# Patient Record
Sex: MALE | Race: BLACK OR AFRICAN AMERICAN | Employment: OTHER | URBAN - METROPOLITAN AREA
[De-identification: names, ages, dates, MRNs, and addresses within clinical notes are randomized per-mention and may not be internally consistent; named-entity substitution may affect disease eponyms.]

---

## 2020-02-10 ENCOUNTER — TELEPHONE (OUTPATIENT)
Dept: ORTHOPEDIC SURGERY | Age: 65
End: 2020-02-10

## 2020-03-11 ENCOUNTER — OFFICE VISIT (OUTPATIENT)
Dept: ORTHOPEDIC SURGERY | Age: 65
End: 2020-03-11

## 2020-03-11 VITALS
SYSTOLIC BLOOD PRESSURE: 149 MMHG | TEMPERATURE: 98.3 F | OXYGEN SATURATION: 95 % | HEART RATE: 80 BPM | BODY MASS INDEX: 25.05 KG/M2 | HEIGHT: 70 IN | WEIGHT: 175 LBS | RESPIRATION RATE: 16 BRPM | DIASTOLIC BLOOD PRESSURE: 77 MMHG

## 2020-03-11 DIAGNOSIS — R29.898 RIGHT LEG WEAKNESS: ICD-10-CM

## 2020-03-11 DIAGNOSIS — M96.1 LUMBAR POST-LAMINECTOMY SYNDROME: Primary | ICD-10-CM

## 2020-03-11 DIAGNOSIS — M54.50 NONSPECIFIC PAIN IN THE LUMBAR REGION: ICD-10-CM

## 2020-03-11 DIAGNOSIS — M54.2 NECK PAIN: ICD-10-CM

## 2020-03-11 DIAGNOSIS — M96.1 CERVICAL POST-LAMINECTOMY SYNDROME: ICD-10-CM

## 2020-03-11 DIAGNOSIS — M54.6 THORACIC SPINE PAIN: ICD-10-CM

## 2020-03-11 RX ORDER — METHOCARBAMOL 500 MG/1
TABLET, FILM COATED ORAL
Qty: 60 TAB | Refills: 0 | Status: SHIPPED | OUTPATIENT
Start: 2020-03-11 | End: 2020-06-08 | Stop reason: SDUPTHER

## 2020-03-11 RX ORDER — GABAPENTIN 300 MG/1
300 CAPSULE ORAL 3 TIMES DAILY
Qty: 90 CAP | Refills: 5 | Status: SHIPPED | OUTPATIENT
Start: 2020-03-11 | End: 2020-09-09

## 2020-03-11 NOTE — PROGRESS NOTES
Wilian Tavarez [facility] [address].  [address], [address]  [address]  Phone: [phone]  Fax: [phone]        Evelio Powers  : 1955  PCP: No primary care provider on file. NEW PATIENT      ASSESSMENT AND PLAN     Diagnoses and all orders for this visit:    1. Lumbar post-laminectomy syndrome  -     MRI LUMB SPINE W WO CONT; Future  -     gabapentin (NEURONTIN) 300 mg capsule; Take 1 Cap by mouth three (3) times daily. Max Daily Amount: 900 mg.    2. Nonspecific pain in the lumbar region  -     AMB POC RADEX ENTIR Denise Doutor Afrânio Junqueira 1460 LMBR CRV SAC SPI W/SKULL MIN 6V  -     MRI LUMB SPINE W WO CONT; Future  -     gabapentin (NEURONTIN) 300 mg capsule; Take 1 Cap by mouth three (3) times daily. Max Daily Amount: 900 mg.    3. Right leg weakness    4. Thoracic spine pain  -     AMB POC RADEX ENTIR [facility] LMBR CRV SAC SPI W/SKULL MIN 6V  -     gabapentin (NEURONTIN) 300 mg capsule; Take 1 Cap by mouth three (3) times daily. Max Daily Amount: 900 mg.    5. Neck pain  -     AMB POC RADEX ENTIR [facility] LMBR CRV SAC SPI W/SKULL MIN 6V  -     MRI CERV SPINE WO CONT; Future    6. Cervical post-laminectomy syndrome  -     MRI CERV SPINE WO CONT; Future  -     gabapentin (NEURONTIN) 300 mg capsule; Take 1 Cap by mouth three (3) times daily. Max Daily Amount: 900 mg. Other orders  -     methocarbamoL (Robaxin) 500 mg tablet; Take 1 tab po BID as needed for pain/spasms       1. Advised to stay active as tolerated. 2. C MRI - neck pain, hand numbness, weakness, hx of fusion  3. L MRI - increasing low back pain, weakness RLE, hx of fusion  4. Restart Gabapentin  5. Restart Robaxin PRN  6. Discussed appropriate back brace use as treatment option  7. Recommend getting PCP for general care such as evaluating tiredness  8. Needs letter for disability. Call pt when complete.    9. Given information on fall prevention, cervical and low back exercises    F/U after MRI      CHIEF COMPLAINT  Esper L John Paul is seen today in consultation as a self referral for complaints of diffuse pain. HISTORY OF PRESENT ILLNESS  Anupama Alba is a 71 y.o. male. Today pt c/o diffuse pain of multiple year duration. Pt has had trauma that caused pain. 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 Pt asks for letter for permanent disability dxes for [employer]. He affirms his neck and back issues began while in the service, although when he  he did not have a disability rating from [employer]. Pt denies seeing his surgeon recently. Wife notes [place] is not caring for him well, so he has not been evaluated since he saw me. She states she went to  Gab[facility]entin and they told her they would mail it, but they never received it. Wife notes intermittent tremor in his wrist/hand. Pt affirms having walker at home. Denies seizures, BP, cholesterol, CVA. Wife notes recent high BP. Pt admits his neck and back have worsened in past 5 years. Location of pain: neck, mid back < low back  Does pain radiate into extremities: BLE, B/L wrist, B/L shoulder. B/L Knee OA. Hand numbness in morning. sensitivity distal BLE  Does patient have weakness: BLE, drops objects some  Pt denies saddle paresthesias. Medications pt is on: Gabapentin & Robaxin previously with benefit. Ran out. Denies persistent fevers, chills, weight changes, neurogenic bowel or bladder symptoms. Pt denies recent ED visits or hospitalizations. Pt denies any recent liver or renal dysfucntion. Treatments patient has tried:  Physical therapy:Yes  Doing HEP: No  Non-opioid medications: Yes  Spinal injections: Unknown  Spinal surgery- yes. ACDF C4-7 2012 with residuals. L4-5-S1 fusion 2011 Dr. Demetra Blevins  No MRI in system     reviewed. No entries. PMHx of neuropathy. Pt is . No disability rating at the time he  from service for back/neck. Last worked 2012 as [employer].      Pain Assessment  3/11/2020   Location of Pain Back;Neck   Location Modifiers (No Data)   Severity of Pain 8   Quality of Pain Dull; Other (Comment)   Quality of Pain Comment numbness, tingling, & weakness   Duration of Pain Persistent   Frequency of Pain Constant   Aggravating Factors Bending   Aggravating Factors Comment turning neck side to side    Limiting Behavior Yes   Relieving Factors Rest   Result of Injury Yes   Work-Related Injury No   Type of Injury Fall         PAST MEDICAL HISTORY   Past Medical History:   Diagnosis Date    Neuropathy        Past Surgical History:   Procedure Laterality Date    HX APPENDECTOMY         MEDICATIONS      Current Outpatient Medications   Medication Sig Dispense Refill    methocarbamoL (Robaxin) 500 mg tablet Take 1 tab po BID as needed for pain/spasms 60 Tab 0    gabapentin (NEURONTIN) 300 mg capsule Take 1 Cap by mouth three (3) times daily. Max Daily Amount: 900 mg. 90 Cap 5    triamcinolone acetonide (KENALOG) 0.025 % topical cream Apply  to affected area two (2) times a day. use thin layer 15 g 0       ALLERGIES  No Known Allergies       SOCIAL HISTORY    Social History     Socioeconomic History    Marital status:      Spouse name: Not on file    Number of children: Not on file    Years of education: Not on file    Highest education level: Not on file   Occupational History    Not on file   Social Needs    Financial resource strain: Not on file    Food insecurity     Worry: Not on file     Inability: Not on file    Transportation needs     Medical: Not on file     Non-medical: Not on file   Tobacco Use    Smoking status: Current Every Day Smoker     Packs/day: 0.50    Smokeless tobacco: Never Used   Substance and Sexual Activity    Alcohol use:  Yes     Alcohol/week: 0.8 standard drinks     Types: 1 Cans of beer per week    Drug use: No    Sexual activity: Not on file   Lifestyle    Physical activity     Days per week: Not on file     Minutes per session: Not on file    Stress: Not on file   Relationships    Social connections     Talks on phone: Not on file     Gets together: Not on file     Attends [employer] service: Not on file     Active member of club or organization: Not on file     Attends meetings of clubs or organizations: Not on file     Relationship status: Not on file    Intimate partner violence     Fear of current or ex partner: Not on file     Emotionally abused: Not on file     Physically abused: Not on file     Forced sexual activity: Not on file   Other Topics Concern    Not on file   Social History Narrative    Not on file       FAMILY HISTORY  No family history on file. REVIEW OF SYSTEMS  Review of Systems   Constitutional: Negative for chills, fever and weight loss. Respiratory: Negative for shortness of breath. Cardiovascular: Negative for chest pain. Gastrointestinal: Negative for constipation. Negative for fecal incontinence   Genitourinary: Negative for dysuria. Negative for urinary incontinence   Musculoskeletal:        Per HPI   Skin: Negative for rash. Neurological: Positive for tingling, tremors, focal weakness and weakness. Negative for dizziness and headaches. Endo/Heme/Allergies: Does not bruise/bleed easily. Psychiatric/Behavioral: The patient does not have insomnia. PHYSICAL EXAMINATION  Visit Vitals  /77 (BP 1 Location: Left arm, BP Patient Position: Sitting)   Pulse 80   Temp 98.3 °F (36.8 °C) (Oral)   Resp 16   Ht 5' 10\" (1.778 m)   Wt 175 lb (79.4 kg)   SpO2 95%   BMI 25.11 kg/m²          Accompanied by spouse. Constitutional:  Well developed, well nourished, in no acute distress. Psychiatric: Affect and mood are appropriate. Integumentary: No rashes or abrasions noted on exposed areas. Cardiovascular/Peripheral Vascular: No peripheral edema is noted BLE. SPINE/MUSCULOSKELETAL EXAM    Cervical spine:  Neck is midline. Normal muscle tone. No focal atrophy is noted.  Pain with end range shoulder ROM.  Tenderness to palpation none cervical spine. Negative Spurling's sign. Negative Tinel's sign. Negative Parra's sign. ThoracoLumbar spine:  No rash, ecchymosis, or gross obliquity. No fasciculations. No focal atrophy is noted. Tenderness to palpation TL junction, LS junction. No tenderness to palpation at the sciatic notch. SI joints non-tender. Trochanters non tender. MOTOR:      Biceps  Triceps Deltoids Wrist Ext Wrist Flex Hand Intrin   Right +4/5 +4/5 +4/5 +4/5 +4/5 4/5   Left +4/5 +4/5 +4/5 +4/5 +4/5 4/5      Hip Flex Quads Hamstrings Ankle DF EHL Ankle PF   Right -4/5 -4/5 -4/5 -4/5 -4/5 -4/5   Left 4/5 4/5 4/5 4/5 4/5 4/5       Straight Leg raise negative. Tenderness to palpation of B/L shins. Pain with knee extension bilaterally. Ambulation with single point cane with shuffling gait, out-turned RLE.  FWB. RADIOGRAPHS  Cervical spine xray films reviewed:  1) ACDF C4-5-6-7  2) cage C4-5-6-7    Thoracic spine xray films reviewed:  1) no compression     Lumbar spine xray films reviewed:  1) Intact hardware L4-5-S1     Written by Nikole Green, as dictated by Santina Hammans, MD.    I, Dr. Santina Hammans, MD, confirm that all documentation is accurate. Mr. Ludwin López may have a reminder for a \"due or due soon\" health maintenance. I have asked that he contact his primary care provider for follow-up on this health maintenance.

## 2020-03-11 NOTE — PROGRESS NOTES
Verbal order entered per Dr. Kasey Palacios as documented on blue sheet:MRI C-spine due to neck pain, hand numbness/weakness. Hx of fusion. MRI L-spine w+wo due to increased weakness RT LE, hx of fusion. Robaxin 500mg take 1 tab po BID prn pain/spasms. Disp 60 no refills. Pended Gabapentin 300mg take 1 tab po TID.  Disp 90 with 5 refills

## 2020-03-11 NOTE — PATIENT INSTRUCTIONS
Preventing Falls: Care Instructions Your Care Instructions Getting around your home safely can be a challenge if you have injuries or health problems that make it easy for you to fall. Loose rugs and furniture in walkways are among the dangers for many older people who have problems walking or who have poor eyesight. People who have conditions such as arthritis, osteoporosis, or dementia also have to be careful not to fall. You can make your home safer with a few simple measures. Follow-up care is a key part of your treatment and safety. Be sure to make and go to all appointments, and call your doctor if you are having problems. It's also a good idea to know your test results and keep a list of the medicines you take. How can you care for yourself at home? Taking care of yourself · You may get dizzy if you do not drink enough water. To prevent dehydration, drink plenty of fluids, enough so that your urine is light yellow or clear like water. Choose water and other caffeine-free clear liquids. If you have kidney, heart, or liver disease and have to limit fluids, talk with your doctor before you increase the amount of fluids you drink. · Exercise regularly to improve your strength, muscle tone, and balance. Walk if you can. Swimming may be a good choice if you cannot walk easily. · Have your vision and hearing checked each year or any time you notice a change. If you have trouble seeing and hearing, you might not be able to avoid objects and could lose your balance. · Know the side effects of the medicines you take. Ask your doctor or pharmacist whether the medicines you take can affect your balance. Sleeping pills or sedatives can affect your balance. · Limit the amount of alcohol you drink. Alcohol can impair your balance and other senses. · Ask your doctor whether calluses or corns on your feet need to be removed.  If you wear loose-fitting shoes because of calluses or corns, you can lose your balance and fall. · Talk to your doctor if you have numbness in your feet. Preventing falls at home · Remove raised doorway thresholds, throw rugs, and clutter. Repair loose carpet or raised areas in the floor. · Move furniture and electrical cords to keep them out of walking paths. · Use nonskid floor wax, and wipe up spills right away, especially on ceramic tile floors. · If you use a walker or cane, put rubber tips on it. If you use crutches, clean the bottoms of them regularly with an abrasive pad, such as steel wool. · Keep your house well lit, especially [employer], and outside walkways. Use night-lights in areas such as hallways and bathrooms. Add extra light switches or use remote switches (such as switches that go on or off when you clap your hands) to make it easier to turn lights on if you have to get up during the night. · Install sturdy handrails on stairways. · Move items in your cabinets so that the things you use a lot are on the lower shelves (about waist level). · Keep a cordless phone and a flashlight with new batteries by your bed. If possible, put a phone in each of the main rooms of your house, or carry a cell phone in case you fall and cannot reach a phone. Or, you can wear a device around your neck or wrist. You push a button that sends a signal for help. · Wear low-heeled shoes that fit well and give your feet good support. Use footwear with nonskid soles. Check the heels and soles of your shoes for wear. Repair or replace worn heels or soles. · Do not wear socks without shoes on wood floors. · Walk on the grass when the sidewalks are slippery. If you live in an area that gets snow and ice in the winter, sprinkle salt on slippery steps and sidewalks. Preventing falls in the bath · Install grab bars and nonskid mats inside and outside your shower or tub and near the toilet and sinks. · Use shower chairs and bath benches. · Use a hand-held shower head that will allow you to sit while showering. · Get into a tub or shower by putting the weaker leg in first. Get out of a tub or shower with your strong side first. 
· Repair loose toilet seats and consider installing a raised toilet seat to make getting on and off the toilet easier. · Keep your bathroom door unlocked while you are in the shower. Where can you learn more? Go to http://eliceo-juan.info/. Enter [id number] [id number] in the search box to learn more about \"Preventing Falls: Care Instructions. \" Current as of: November 7, 2018 Content Version: 12.2 © 0906-3616 [employer]. Care instructions adapted under license by [employer] (which disclaims liability or warranty for this information). If you have questions about a medical condition or this instruction, always ask your healthcare professional. [place] [address] any warranty or liability for your use of this information. Preventing Outdoor Falls: Care Instructions Your Care Instructions Worries about falls don't need to keep you indoors. Outdoor activities like walking have big benefits for your health. You will need to watch your step and learn a few safety measures. If you are worried about having a fall outdoors, ask your doctor about exercises, classes, or physical therapy that may help. You can learn ways to gain strength, flexibility, and balance. Ask if it might help to use a cane or walker. You can make your time outdoors safer with a few simple measures. Follow-up care is a key part of your treatment and safety. Be sure to make and go to all appointments, and call your doctor if you are having problems. It's also a good idea to know your test results and keep a list of the medicines you take. How can you prevent falls outdoors? · Wear shoes with firm soles and low heels.  If you have to walk on an icy surface, use grippers that can be worn over your shoes in bad weather. · Be extra careful if weather is bad. Walk on the grass when the sidewalks are slick. If you live in a place that gets snow and ice in the winter, sprinkle salt on slippery stairs and sidewalks. · Be careful getting on or off buses and trains or getting in and out of cars. If handrails are available, use them. · Be careful when you cross the street. Look for crosswalks or places where curb cuts or ramps are present. · Try not to hurry, especially if you are carrying something. · Be cautious in parking lots or garages. There may be curbs or changes in pavement, or the height of the pavement may vary. · Make sure to wear the correct eyeglasses, if you need them. Reading glasses or bifocals can make it harder to see hazards that might be in your way. · If you are walking outdoors for exercise, try to: 
? Walk in well-lighted, well-maintained areas. These include high school or college tracks, shopping malls, and public spaces. ? Walk with a partner. ? Watch out for cracked sidewalks, curbs, changes in the height of the pavement, exposed tree roots, and debris such as fallen leaves or branches. Where can you learn more? Go to http://eliceo-juan.info/. Enter A558 in the search box to learn more about \"Preventing Outdoor Falls: Care Instructions. \" Current as of: November 7, 2018 Content Version: 12.2 © 6035-8882 [employer]. Care instructions adapted under license by [employer] (which disclaims liability or warranty for this information). If you have questions about a medical condition or this instruction, always ask your healthcare professional. [address] any warranty or liability for your use of this information. Neck: Exercises Introduction Here are some examples of exercises for you to try.  The exercises may be suggested for a condition or for rehabilitation. Start each exercise slowly. Ease off the exercises if you start to have pain. You will be told when to start these exercises and which ones will work best for you. How to do the exercises Neck stretch 1. This stretch works best if you keep your shoulder down as you lean away from it. To help you remember to do this, start by relaxing your shoulders and lightly holding on to your thighs or your chair. 2. Tilt your head toward your shoulder and hold for 15 to 30 seconds. Let the weight of your head stretch your muscles. 3. If you would like a little added stretch, use your hand to gently and steadily pull your head toward your shoulder. For example, keeping your right shoulder down, lean your head to the left. 4. Repeat 2 to 4 times toward each shoulder. Diagonal neck stretch 1. Turn your head slightly toward the direction you will be stretching, and tilt your head diagonally toward your chest and hold for 15 to 30 seconds. 2. If you would like a little added stretch, use your hand to gently and steadily pull your head forward on the diagonal. 
3. Repeat 2 to 4 times toward each side. Dorsal glide stretch 1. Sit or stand tall and look straight ahead. 2. Slowly tuck your chin as you glide your head backward over your body 3. Hold for a count of 6, and then relax for up to 10 seconds. 4. Repeat 8 to 12 times. Chest and shoulder stretch 1. Sit or stand tall and glide your head backward as in the dorsal glide stretch. 2. Raise both arms so that your hands are next to your ears. 3. Take a deep breath, and as you breathe out, lower your elbows down and behind your back. You will feel your shoulder blades slide down and together, and at the same time you will feel a stretch across your chest and the front of your shoulders. 4. Hold for about 6 seconds, and then relax for up to 10 seconds. 5. Repeat 8 to 12 times. Strengthening: Hands on head 1. Move your head backward, forward, and side to side against gentle pressure from your hands, holding each position for about 6 seconds. 2. Repeat 8 to 12 times. Follow-up care is a key part of your treatment and safety. Be sure to make and go to all appointments, and call your doctor if you are having problems. It's also a good idea to know your test results and keep a list of the medicines you take. Where can you learn more? Go to http://eliceo-juan.info/. Enter P975 in the search box to learn more about \"Neck: Exercises. \" Current as of: June 26, 2019 Content Version: 12.2 © 2894-4173 [employer]. Care instructions adapted under license by [employer] (which disclaims liability or warranty for this information). If you have questions about a medical condition or this instruction, always ask your healthcare professional. [address] any warranty or liability for your use of this information. Low Back Pain: Exercises Introduction Here are some examples of exercises for you to try. The exercises may be suggested for a condition or for rehabilitation. Start each exercise slowly. Ease off the exercises if you start to have pain. You will be told when to start these exercises and which ones will work best for you. How to do the exercises Press-up 1. Lie on your stomach, supporting your body with your forearms. 2. Press your elbows down into the floor to raise your upper back. As you do this, relax your stomach muscles and allow your back to arch without using your back muscles. As your press up, do not let your hips or pelvis come off the floor. 3. Hold for 15 to 30 seconds, then relax. 4. Repeat 2 to 4 times. Alternate arm and leg (bird dog) exercise 1. Start on the floor, on your hands and knees. 2. Tighten your belly muscles. 3. Raise one leg off the floor, and hold it straight out behind you. Be careful not to let your hip drop down, because that will twist your trunk. 4. Hold for about 6 seconds, then lower your leg and switch to the other leg. 5. Repeat 8 to 12 times on each leg. 6. Over time, work up to holding for 10 to 30 seconds each time. 7. If you feel stable and secure with your leg raised, try raising the opposite arm straight out in front of you at the same time. Knee-to-chest exercise 1. Lie on your back with your knees bent and your feet flat on the floor. 2. Bring one knee to your chest, keeping the other foot flat on the floor (or keeping the other leg straight, whichever feels better on your lower back). 3. Keep your lower back pressed to the floor. Hold for at least 15 to 30 seconds. 4. Relax, and lower the knee to the starting position. 5. Repeat with the other leg. Repeat 2 to 4 times with each leg. 6. To get more stretch, put your other leg flat on the floor while pulling your knee to your chest. 
 
Curl-ups 1. Lie on the floor on your back with your knees bent at a 90-degree angle. Your feet should be flat on the floor, about 12 inches from your buttocks. 2. Cross your arms over your chest. If this bothers your neck, try putting your hands behind your neck (not your head), with your elbows spread apart. 3. Slowly tighten your belly muscles and raise your shoulder blades off the floor. 4. Keep your head in line with your body, and do not press your chin to your chest. 
5. Hold this position for 1 or 2 seconds, then slowly lower yourself back down to the floor. 6. Repeat 8 to 12 times. Pelvic tilt exercise 1. Lie on your back with your knees bent. 2. \"Brace\" your stomach. This means to tighten your muscles by pulling in and imagining your belly button moving toward your spine. You should feel like your back is pressing to the floor and your hips and pelvis are rocking back. 3. Hold for about 6 seconds while you breathe smoothly. 4. Repeat 8 to 12 times. Heel dig bridging 1. Lie on your back with both knees bent and your ankles bent so that only your heels are digging into the floor. Your knees should be bent about 90 degrees. 2. Then push your heels into the floor, squeeze your buttocks, and lift your hips off the floor until your shoulders, hips, and knees are all in a straight line. 3. Hold for about 6 seconds as you continue to breathe normally, and then slowly lower your hips back down to the floor and rest for up to 10 seconds. 4. Do 8 to 12 repetitions. Hamstring stretch in doorway 1. Lie on your back in a doorway, with one leg through the open door. 2. Slide your leg up the wall to straighten your knee. You should feel a gentle stretch down the back of your leg. 3. Hold the stretch for at least 15 to 30 seconds. Do not arch your back, point your toes, or bend either knee. Keep one heel touching the floor and the other heel touching the wall. 4. Repeat with your other leg. 5. Do 2 to 4 times for each leg. Hip flexor stretch 1. Kneel on the floor with one knee bent and one leg behind you. Place your forward knee over your foot. Keep your other knee touching the floor. 2. Slowly push your hips forward until you feel a stretch in the upper thigh of your rear leg. 3. Hold the stretch for at least 15 to 30 seconds. Repeat with your other leg. 4. Do 2 to 4 times on each side. Wall sit 1. Stand with your back 10 to 12 inches away from a wall. 2. Lean into the wall until your back is flat against it. 3. Slowly slide down until your knees are slightly bent, pressing your lower back into the wall. 4. Hold for about 6 seconds, then slide back up the wall. 5. Repeat 8 to 12 times. Follow-up care is a key part of your treatment and safety.  Be sure to make and go to all appointments, and call your doctor if you are having problems. It's also a good idea to know your test results and keep a list of the medicines you take. Where can you learn more? Go to http://eliceo-juan.info/. Enter F303 in the search box to learn more about \"Low Back Pain: Exercises. \" Current as of: June 26, 2019 Content Version: 12.2 © 5193-1318 [employer]. Care instructions adapted under license by [employer] (which disclaims liability or warranty for this information). If you have questions about a medical condition or this instruction, always ask your healthcare professional. [place] [address] any warranty or liability for your use of this information.

## 2020-03-12 ENCOUNTER — DOCUMENTATION ONLY (OUTPATIENT)
Dept: ORTHOPEDIC SURGERY | Age: 65
End: 2020-03-12

## 2020-03-12 NOTE — PROGRESS NOTES
I signed a medical status letter for patient (on the printer). Plz let him know that it is ready for him to .

## 2020-03-13 NOTE — PROGRESS NOTES
I attempted to contact the pt about the letter. He was not able to be reached. A message could not be left on either number. The home number had a message that the customer was not accepting calls at this time and the mobile number's voice mailbox was full. Will attempt to contact the pt at a later time.

## 2020-03-16 NOTE — PROGRESS NOTES
2 more calls to pt have been unsuccessful. Message states customer not taking calls at this time. Letter will be mailed to pt at listed address.

## 2020-06-01 ENCOUNTER — TELEPHONE (OUTPATIENT)
Dept: ORTHOPEDIC SURGERY | Age: 65
End: 2020-06-01

## 2020-06-01 DIAGNOSIS — F41.8 TEST ANXIETY: Primary | ICD-10-CM

## 2020-06-01 RX ORDER — DIAZEPAM 10 MG/1
TABLET ORAL
Qty: 1 TAB | Refills: 0 | Status: SHIPPED | OUTPATIENT
Start: 2020-06-01 | End: 2020-09-09

## 2020-06-01 NOTE — TELEPHONE ENCOUNTER
PT went to have his MRI this weekend but couldn't complete it as he got sick due to his nerves. He is requesting something be prescribed to calm him down during the MRI and it would need to be rescheduled. Pt uses [employer] [facility] [employer]. Please call [employer] (on HIPAA) at [phone].

## 2020-06-01 NOTE — TELEPHONE ENCOUNTER
I called and spoke with MsFreida Jennifer Sandoval. She is on the pt's HIPAA form for all access. She was notified of the new medication that was sent over to his [facility] on file. She was advised that the pt will need a  to take him to the MRI because the medication can cause sedation. She verbalized understanding and will be taking him to the MRI. Ms. Addis West will be contacting the scheduling dept today to reschedule the test. No questions or concerns voiced at this time.

## 2020-06-01 NOTE — TELEPHONE ENCOUNTER
I sent a valium to the pharmacy. Call pt and let him know that her will need a  to and from the MRI. Please make sure the MRI gets rescheduled.

## 2020-06-03 ENCOUNTER — HOSPITAL ENCOUNTER (OUTPATIENT)
Dept: MRI IMAGING | Age: 65
Discharge: HOME | End: 2020-06-03
Attending: PHYSICAL MEDICINE & REHABILITATION
Payer: MEDICARE

## 2020-06-03 VITALS — WEIGHT: 180 LBS | BODY MASS INDEX: 25.83 KG/M2

## 2020-06-03 PROCEDURE — 72141 MRI NECK SPINE W/O DYE: CPT

## 2020-06-03 PROCEDURE — 74011250636 HC RX REV CODE- 250/636: Performed by: PHYSICAL MEDICINE & REHABILITATION

## 2020-06-03 PROCEDURE — 72158 MRI LUMBAR SPINE W/O & W/DYE: CPT

## 2020-06-03 PROCEDURE — A9575 INJ GADOTERATE MEGLUMI 0.1ML: HCPCS | Performed by: PHYSICAL MEDICINE & REHABILITATION

## 2020-06-03 RX ORDER — GADOTERATE MEGLUMINE 376.9 MG/ML
18 INJECTION INTRAVENOUS
Status: COMPLETED | OUTPATIENT
Start: 2020-06-03 | End: 2020-06-03

## 2020-06-03 RX ADMIN — GADOTERATE MEGLUMINE 18 ML: 376.9 INJECTION INTRAVENOUS at 13:19

## 2020-06-08 ENCOUNTER — OFFICE VISIT (OUTPATIENT)
Dept: ORTHOPEDIC SURGERY | Age: 65
End: 2020-06-08

## 2020-06-08 VITALS
SYSTOLIC BLOOD PRESSURE: 137 MMHG | DIASTOLIC BLOOD PRESSURE: 80 MMHG | HEART RATE: 88 BPM | WEIGHT: 180 LBS | HEIGHT: 70 IN | TEMPERATURE: 98.7 F | BODY MASS INDEX: 25.77 KG/M2 | RESPIRATION RATE: 16 BRPM

## 2020-06-08 DIAGNOSIS — Z98.1 HX OF SPINAL FUSION: ICD-10-CM

## 2020-06-08 DIAGNOSIS — M48.061 SPINAL STENOSIS OF LUMBAR REGION, UNSPECIFIED WHETHER NEUROGENIC CLAUDICATION PRESENT: ICD-10-CM

## 2020-06-08 DIAGNOSIS — M48.02 CERVICAL STENOSIS OF SPINE: Primary | ICD-10-CM

## 2020-06-08 RX ORDER — METHOCARBAMOL 500 MG/1
500 TABLET, FILM COATED ORAL
Qty: 90 TAB | Refills: 2 | Status: SHIPPED | OUTPATIENT
Start: 2020-06-08 | End: 2020-09-09 | Stop reason: SDUPTHER

## 2020-06-08 NOTE — PROGRESS NOTES
Gregory Douglas presents today for   Chief Complaint   Patient presents with    Neck Pain     MRI fu       Is someone accompanying this pt? NO    Is the patient using any DME equipment during OV? YES, one point cane    Depression Screening:  3 most recent PHQ Screens 6/8/2020   Little interest or pleasure in doing things Not at all   Feeling down, depressed, irritable, or hopeless Not at all   Total Score PHQ 2 0         Fall Risk  Fall Risk Assessment, last 12 mths 6/8/2020   Able to walk? Yes   Fall in past 12 months? No         Coordination of Care:  1. Have you been to the ER, urgent care clinic since your last visit? NO  Hospitalized since your last visit? NO    2. Have you seen or consulted any other health care providers outside of the [address] [place] [address] since your last visit? NO Include any pap smears or colon screening.  NO    Last  Checked 6/8/2020

## 2020-06-08 NOTE — LETTER
6/8/2020 9:31 AM 
 
Mr. Caitlin Jett [id number] [place] [address] To Whom It May Concern: 
 
Lolitamatilda MARY Coker is currently under the care of [facility]. Mr. Bobby Marie is permanently disabled due to cervical stenosis at C3-C4 and lumbar stenosis at L3-L4. If there are questions or concerns please have the patient contact our office. Sincerely, Roxianne Cockayne, MD

## 2020-06-08 NOTE — PATIENT INSTRUCTIONS
Preventing Falls: Care Instructions Your Care Instructions Getting around your home safely can be a challenge if you have injuries or health problems that make it easy for you to fall. Loose rugs and furniture in walkways are among the dangers for many older people who have problems walking or who have poor eyesight. People who have conditions such as arthritis, osteoporosis, or dementia also have to be careful not to fall. You can make your home safer with a few simple measures. Follow-up care is a key part of your treatment and safety. Be sure to make and go to all appointments, and call your doctor if you are having problems. It's also a good idea to know your test results and keep a list of the medicines you take. How can you care for yourself at home? Taking care of yourself · You may get dizzy if you do not drink enough water. To prevent dehydration, drink plenty of fluids, enough so that your urine is light yellow or clear like water. Choose water and other caffeine-free clear liquids. If you have kidney, heart, or liver disease and have to limit fluids, talk with your doctor before you increase the amount of fluids you drink. · Exercise regularly to improve your strength, muscle tone, and balance. Walk if you can. Swimming may be a good choice if you cannot walk easily. · Have your vision and hearing checked each year or any time you notice a change. If you have trouble seeing and hearing, you might not be able to avoid objects and could lose your balance. · Know the side effects of the medicines you take. Ask your doctor or pharmacist whether the medicines you take can affect your balance. Sleeping pills or sedatives can affect your balance. · Limit the amount of alcohol you drink. Alcohol can impair your balance and other senses. · Ask your doctor whether calluses or corns on your feet need to be removed.  If you wear loose-fitting shoes because of calluses or corns, you can lose your balance and fall. · Talk to your doctor if you have numbness in your feet. Preventing falls at home · Remove raised doorway thresholds, throw rugs, and clutter. Repair loose carpet or raised areas in the floor. · Move furniture and electrical cords to keep them out of walking paths. · Use nonskid floor wax, and wipe up spills right away, especially on ceramic tile floors. · If you use a walker or cane, put rubber tips on it. If you use crutches, clean the bottoms of them regularly with an abrasive pad, such as steel wool. · Keep your house well lit, especially [place], and outside walkways. Use night-lights in areas such as hallways and bathrooms. Add extra light switches or use remote switches (such as switches that go on or off when you clap your hands) to make it easier to turn lights on if you have to get up during the night. · Install sturdy handrails on stairways. · Move items in your cabinets so that the things you use a lot are on the lower shelves (about waist level). · Keep a cordless phone and a flashlight with new batteries by your bed. If possible, put a phone in each of the main rooms of your house, or carry a cell phone in case you fall and cannot reach a phone. Or, you can wear a device around your neck or wrist. You push a button that sends a signal for help. · Wear low-heeled shoes that fit well and give your feet good support. Use footwear with nonskid soles. Check the heels and soles of your shoes for wear. Repair or replace worn heels or soles. · Do not wear socks without shoes on wood floors. · Walk on the grass when the sidewalks are slippery. If you live in an area that gets snow and ice in the winter, sprinkle salt on slippery steps and sidewalks. Preventing falls in the bath · Install grab bars and nonskid mats inside and outside your shower or tub and near the toilet and sinks. · Use shower chairs and bath benches. · Use a hand-held shower head that will allow you to sit while showering. · Get into a tub or shower by putting the weaker leg in first. Get out of a tub or shower with your strong side first. 
· Repair loose toilet seats and consider installing a raised toilet seat to make getting on and off the toilet easier. · Keep your bathroom door unlocked while you are in the shower. Where can you learn more? Go to http://eliceo-juan.info/ Enter 0476 79 69 71 in the search box to learn more about \"Preventing Falls: Care Instructions. \" Current as of: August 7, 2019               Content Version: 12.5 © 4942-4829 [employer]. Care instructions adapted under license by [employer] (which disclaims liability or warranty for this information). If you have questions about a medical condition or this instruction, always ask your healthcare professional. [address] any warranty or liability for your use of this information. Preventing Outdoor Falls: Care Instructions Your Care Instructions Worries about falls don't need to keep you indoors. Outdoor activities like walking have big benefits for your health. You will need to watch your step and learn a few safety measures. If you are worried about having a fall outdoors, ask your doctor about exercises, classes, or physical therapy that may help. You can learn ways to gain strength, flexibility, and balance. Ask if it might help to use a cane or walker. You can make your time outdoors safer with a few simple measures. Follow-up care is a key part of your treatment and safety. Be sure to make and go to all appointments, and call your doctor if you are having problems. It's also a good idea to know your test results and keep a list of the medicines you take. How can you prevent falls outdoors? · Wear shoes with firm soles and low heels.  If you have to walk on an icy surface, use grippers that can be worn over your shoes in bad weather. · Be extra careful if weather is bad. Walk on the grass when the sidewalks are slick. If you live in a place that gets snow and ice in the winter, sprinkle salt on slippery stairs and sidewalks. · Be careful getting on or off buses and trains or getting in and out of cars. If handrails are available, use them. · Be careful when you cross the street. Look for crosswalks or places where curb cuts or ramps are present. · Try not to hurry, especially if you are carrying something. · Be cautious in parking lots or garages. There may be curbs or changes in pavement, or the height of the pavement may vary. · Make sure to wear the correct eyeglasses, if you need them. Reading glasses or bifocals can make it harder to see hazards that might be in your way. · If you are walking outdoors for exercise, try to: 
? Walk in well-lighted, well-maintained areas. These include high school or college tracks, shopping malls, and public spaces. ? Walk with a partner. ? Watch out for cracked sidewalks, curbs, changes in the height of the pavement, exposed tree roots, and debris such as fallen leaves or branches. Where can you learn more? Go to http://eliceo-juan.info/ Enter I467 in the search box to learn more about \"Preventing Outdoor Falls: Care Instructions. \" Current as of: August 7, 2019               Content Version: 12.5 © 2394-2732 [employer]. Care instructions adapted under license by [employer] (which disclaims liability or warranty for this information). If you have questions about a medical condition or this instruction, always ask your healthcare professional. [address] any warranty or liability for your use of this information.

## 2020-06-08 NOTE — PROGRESS NOTES
Wilian Tavarez [facility], [place] [address].  [address], [address]  [place] [address]  Phone: [phone]  Fax: [phone]        Lauren Phoenix  : 1955  PCP: None    PROGRESS NOTE      ASSESSMENT AND PLAN    Diagnoses and all orders for this visit:    1. Cervical stenosis of spine, C3-C4, jx  -     methocarbamoL (Robaxin) 500 mg tablet; Take 1 Tab by mouth three (3) times daily as needed for Muscle Spasm(s). Take 1 tab po BID as needed for pain/spasms    2. Spinal stenosis of lumbar region, L3/4, jx    3. Hx of spinal fusion, lumbar and cervical  Comments:          1. 76 y.o. male with junctional change in both his cervical and lumbar spine. Does not want sx. 2. Advised to continue HEP  3. Refill Robaxin prn  4. Permanently disabled note given  5. Given information on fall precautions    Follow-up and Dispositions    · Return in about 3 months (around 2020). HISTORY OF PRESENT ILLNESS  Anupama Solis is a 76 y.o. male. Pt was last evaluated 3/2020 for lumbar post laminectomy syndrome. Pt instructed to restart  Gabapentin and Robaxin PRN. Last visit pt was sent to have a C and L spine MRI. Images reviewed with the pt. Pt notes that the Gabapentin provided minimal relief. Reports more benefit with Robaxin than Gabapentin. Notes tingling/numbness in RLE. Notes that last night his back hurt really bad. Pt reports hand weakness, noting dropping items with his hands. Pt brings in disability paperwork. Location of pain: neck, low back  Does pain radiate into extremities: BLE  Does patient have weakness:  hand weakness-drops objects some  Pt denies saddle paresthesias. Medications pt is on: Gabapentin 300 mg TID-minimal benefit & Robaxin TID-QID-with great benefit, denies dizziness. Denies persistent fevers, chills, weight changes, neurogenic bowel or bladder symptoms. Pt denies recent ED visits or hospitalizations. Pt denies any recent liver or renal dysfucntion.    Treatments patient has tried:  Physical therapy:Yes  Doing HEP: No  Non-opioid medications: Yes  Spinal injections: Unknown  Spinal surgery- yes. ACDF C4-7 2012 with residuals. L4-5-S1 fusion 2011 Dr. Medardo Farias  No MRI in system  Last C MRI 5/2020: Intact fusion from C4-C7. Mod stenosis at C3-C4  Last L MRI 5/2020:  Fusion L4-S1 intact. Severe stenosis at L3-L4.      reviewed. No entries. PMHx of neuropathy. Pt is . No disability rating at the time he  from service for back/neck. Last worked 2012 as [employer]. Pain Assessment  6/8/2020   Location of Pain Neck   Location Modifiers -   Severity of Pain 8   Quality of Pain Aching   Quality of Pain Comment -   Duration of Pain -   Frequency of Pain Constant   Aggravating Factors (No Data)   Aggravating Factors Comment pain is just there   Limiting Behavior Some   Relieving Factors Ice;Heat   Relieving Factors Comment meds   Result of Injury -   Work-Related Injury -   Type of Injury -       MRI Results (maximum last 3): Results from [place] encounter on 05/29/20   MRI LUMB SPINE W WO CONT    Narrative MR lumbar spine with and without contrast    HISTORY: Nonspecific pain in the lumbar region, lumbar postlaminectomy syndrome,  increased LBP, weakness right lower extremity, hx of fusion    COMPARISON: None. TECHNIQUE: Lumbar spine scanned with axial and sagittal T1W scans, axial and  sagittal T2W scans, and with post gadolinium axial and sagittal T1W scans. Patient received 18 mL Dotarem IV contrast.    FINDINGS:      Laminectomy and posterolateral fusion with pedicular screws and rods at L4-S1. Susceptibility artifact and field inhomogeneity slightly limiting evaluation. Grade 1 anterolisthesis L3-4. Normal alignment otherwise. Vertebral body heights are normal.  No acute or chronic fractures. Severe disc height loss L5-S1. Mild disc height loss L3-4 and L4-5. Chronic discogenic fatty bone marrow changes at L5-S1.  Mild reactive discogenic  bone marrow edema changes at L3-4. Bone marrow lesion. Conus terminates at the L1-2 level with normal signal. Crowding and waviness of  the cauda equina nerve roots indicating high-grade stenosis with enhancement  left-sided L5 and S1 nerve roots and probably L4 nerve root. There is fatty  filum terminale. Correlation of sagittal and axial images at the level of the discs demonstrates  the following:    T11-T12: Sagittal images only. Minimal disc bulge and facet hypertrophy. Patent  central canal. Right neural foramina stenosis. T12-L1: Unremarkable disc. Patent canal and foramina. L1-L2: Unremarkable disc. Patent canal and foramina. L2-L3: Unremarkable disc. Patent canal and foramina. L3-L4: Mild disc height loss and grade 1 anterolisthesis. Circumferential disc  bulge with osteophytic ridging. Moderate bilateral facet hypertrophy and  ligamentum flavum thickening. Severe central canal and lateral recesses  stenosis. Severe right and moderate to severe left foraminal stenosis. Impingement on the right foramina L3 nerve root by disc and facet hypertrophy. Displacement and likely some compression on the left foraminal L3 nerve root. Kathrene Bolk L4-L5: Laminectomy and fusion. Mild disc bulge with osteophytic ridging. Patent  central canal. Mild to moderate foraminal stenosis without neural compression. Kathrene Bolk L5-S1: Laminectomy and fusion. Severe disc height loss with circumferential  osteophytic ridging. Patent central canal. Moderate foraminal stenosis with  slight deformity of the L5 foraminal nerve root due to endplate osteophytes but  no gross impingement. Other soft tissue: Unremarkable:      Impression IMPRESSION:    1. Laminectomy and fusion L4-S1 with widely patent central canal at fusion  levels  -Moderate L5-S1 foraminal stenosis due to osteophytic ridging with slight  deformity of the L5 foraminal nerve roots but no gross impingement.   2. Multifactorial severe degenerative central canal stenosis and lateral  recesses stenosis with anterolisthesis at L3-4  -Severe right and moderate left foraminal stenosis at L3-4 with right greater  than left nerve root compression as discussed in findings. -Crowding and waviness of the cauda equina nerve roots due to high-grade  stenosis with enhancement along left-sided L5 and S1 nerve roots and probably L4  nerve root. MRI CERV SPINE WO CONT    Narrative MR CERVICAL SPINE WITHOUT CONTRAST    HISTORY: neck pain, hand numbness/weakness. History of cervical fusion    COMPARISON: None    TECHNIQUE: Multisequence multiplanar imaging through the cervical spine. FINDINGS:    Postop: Anterior cervical fusion C4-C7 with C5 corpectomy and cage strut  Alignment: Intact alignment. Vertebral body height: Normal  Marrow signal: No suspicious bone marrow lesion. Minimal left subchondral  reactive bone marrow changes at multiple facet    Cervicomedullary Junction: Patent  Cervical cord: Subtle increased gradient and T2 signal abnormality at the  ventral right cord at C3-4 level (series 7 image 116 and series 8 image 75). Otherwise normal cord signal. Further discussed below where relevant. On axial imaging, findings at each level are as follows:    C2/C3:  Mild disc bulge partially effacing ventral CSF space. Mild ligamentum  flavum thickening partially effacing dorsal CSF space. AP canal diameter 10 mm. Mild bilateral facet hypertrophy. Uncovertebral spurring. No right and moderate  left foraminal stenosis. .    C3/C4: Posterior disc protrusion flattening ventral cord. Ligamentum flavum  thickening. AP canal diameter 6-7 mm. Uncovertebral spurring. Mild right and  moderate left facet hypertrophy. Moderate right and severe left foraminal  stenosis. C4/C5: Discectomy and fusion. AP canal diameter 11 mm. Uncovertebral spurring. Moderate right and severe left foraminal stenosis. C5/C6: Discectomy and fusion.  AP canal diameter 13 mm. Uncovertebral spurring. Severe foraminal stenosis. C6/C7: Discectomy and fusion. AP canal diameter 12 mm. Vertebral spurring. Moderate foraminal stenosis. C7/T1: Mild disc bulge partially effacing ventral CSF space. AP canal diameter  11 mm. Mild left facet hypertrophy. Patent right and mild left foraminal  stenosis. Remainder of the visualized upper thoracic levels T1-T4 demonstrate mild disc  bulges but overall patent canal and foramina. Other structures: Unremarkable      Impression IMPRESSION:    1. Anterior cervical fusion C4-C7 with normal alignment and patent central  canal. High-grade foraminal stenosis at fusion levels due to uncovertebral  spurring. 2. Facet arthrosis and degenerative disc disease above fusion level. C3-4  moderate disc protrusion flattening ventral cord and together with ligamentum  flavum thickening results in cord compression and moderate compromise of the  central canal.  -Subtle increased right ventral cord signal abnormality at C3-4 indicating mild  myelopathy or myelomalacia. -Severe left and moderate right foraminal stenosis at C3-4. PAST MEDICAL HISTORY   Past Medical History:   Diagnosis Date    Neuropathy        Past Surgical History:   Procedure Laterality Date    HX APPENDECTOMY      HX CERVICAL FUSION  2012    [facility]  2011    Dr. Alhaji Butt   . MEDICATIONS      Current Outpatient Medications   Medication Sig Dispense Refill    methocarbamoL (Robaxin) 500 mg tablet Take 1 Tab by mouth three (3) times daily as needed for Muscle Spasm(s). Take 1 tab po BID as needed for pain/spasms 90 Tab 2    gabapentin (NEURONTIN) 300 mg capsule Take 1 Cap by mouth three (3) times daily. Max Daily Amount: 900 mg. 90 Cap 5    triamcinolone acetonide (KENALOG) 0.025 % topical cream Apply  to affected area two (2) times a day. use thin layer 15 g 0    diazePAM (Valium) 10 mg tablet 1 tab po 30 mins prior to MRI.  Will need  to and from MRI. 1 Tab 0        Controlled Substance Monitoring:    No flowsheet data found. ALLERGIES  No Known Allergies       SOCIAL HISTORY    Social History     Socioeconomic History    Marital status:      Spouse name: Not on file    Number of children: Not on file    Years of education: Not on file    Highest education level: Not on file   Occupational History    Not on file   Social Needs    Financial resource strain: Not on file    Food insecurity     Worry: Not on file     Inability: Not on file    Transportation needs     Medical: Not on file     Non-medical: Not on file   Tobacco Use    Smoking status: Current Every Day Smoker     Packs/day: 0.50    Smokeless tobacco: Never Used   Substance and Sexual Activity    Alcohol use: Yes     Alcohol/week: 0.8 standard drinks     Types: 1 Cans of beer per week    Drug use: No    Sexual activity: Not on file   Lifestyle    Physical activity     Days per week: Not on file     Minutes per session: Not on file    Stress: Not on file   Relationships    Social connections     Talks on phone: Not on file     Gets together: Not on file     Attends [employer] service: Not on file     Active member of club or organization: Not on file     Attends meetings of clubs or organizations: Not on file     Relationship status: Not on file    Intimate partner violence     Fear of current or ex partner: Not on file     Emotionally abused: Not on file     Physically abused: Not on file     Forced sexual activity: Not on file   Other Topics Concern    Not on file   Social History Narrative    Not on file       FAMILY HISTORY  History reviewed. No pertinent family history. REVIEW OF SYSTEMS  Review of Systems   Constitutional: Negative for chills, fever and weight loss. Respiratory: Negative for shortness of breath. Cardiovascular: Negative for chest pain. Gastrointestinal: Negative for constipation.         Negative for fecal incontinence   Genitourinary: Negative for dysuria. Negative for urinary incontinence   Musculoskeletal:        Per HPI   Skin: Negative for rash. Neurological: Positive for tingling and focal weakness. Negative for dizziness, tremors and headaches. Endo/Heme/Allergies: Does not bruise/bleed easily. Psychiatric/Behavioral: The patient does not have insomnia. PHYSICAL EXAMINATION  Visit Vitals  /80 (BP 1 Location: Left arm, BP Patient Position: Sitting)   Pulse 88   Temp 98.7 °F (37.1 °C) (Oral)   Resp 16   Ht 5' 10\" (1.778 m)   Wt 180 lb (81.6 kg)   BMI 25.83 kg/m²         Accompanied by self. Constitutional:  Well developed, well nourished, in no acute distress. Psychiatric: Affect and mood are appropriate. Integumentary: No rashes or abrasions noted on exposed areas. Cardiovascular/Peripheral Vascular: No peripheral edema is noted BLE. SPINE/MUSCULOSKELETAL EXAM    Cervical spine:  Neck is midline. Normal muscle tone. No focal atrophy is noted. Tenderness to palpation B/L upper traps. Negative Spurling's sign. Negative Tinel's sign. Negative Parra's sign. Lumbar spine:  No rash, ecchymosis, or gross obliquity. No fasciculations. No focal atrophy is noted. Tenderness to palpation L4-L5, R Trochanteric bursa. No tenderness to palpation at the sciatic notch. SI joints non-tender. MOTOR:          Biceps  Triceps Deltoids Wrist Ext Wrist Flex Hand Intrin   Right +4/5 +4/5 +4/5 +4/5 +4/5 4/5   Left +4/5 +4/5 +4/5 +4/5 +4/5 4/5        Hip Flex Quads Hamstrings Ankle DF EHL Ankle PF   Right 4/5 4/5 4/5 4/5 4/5 4/5   Left 4/5 4/5 4/5 4/5 4/5 4/5       DTRs are hypoactive throughout biceps, triceps, brachioradialis, patella, and Achilles. Straight Leg raise negative. Ambulation with single point cane with limp. DWB. Written by Marleen Cornejo, as dictated by Francisco Foss MD.    I, Dr. Francisco Foss MD, confirm that all documentation is accurate.       Mr. Bea Winn may have a reminder for a \"due or due soon\" health maintenance. I have asked that he contact his primary care provider for follow-up on this health maintenance.

## 2020-06-09 ENCOUNTER — TELEPHONE (OUTPATIENT)
Dept: ORTHOPEDIC SURGERY | Age: 65
End: 2020-06-09

## 2020-06-09 NOTE — TELEPHONE ENCOUNTER
Methocarbamol was sent to the pharmacy with 2 sets of directions. Pharmacy is asking for clarification.

## 2020-09-09 ENCOUNTER — OFFICE VISIT (OUTPATIENT)
Dept: ORTHOPEDIC SURGERY | Age: 65
End: 2020-09-09

## 2020-09-09 VITALS
RESPIRATION RATE: 16 BRPM | WEIGHT: 179 LBS | TEMPERATURE: 97.8 F | SYSTOLIC BLOOD PRESSURE: 133 MMHG | HEART RATE: 86 BPM | DIASTOLIC BLOOD PRESSURE: 67 MMHG | BODY MASS INDEX: 25.68 KG/M2

## 2020-09-09 DIAGNOSIS — M48.02 CERVICAL STENOSIS OF SPINE: ICD-10-CM

## 2020-09-09 DIAGNOSIS — M48.061 SPINAL STENOSIS OF LUMBAR REGION, UNSPECIFIED WHETHER NEUROGENIC CLAUDICATION PRESENT: Primary | ICD-10-CM

## 2020-09-09 RX ORDER — METHOCARBAMOL 500 MG/1
500 TABLET, FILM COATED ORAL
Qty: 270 TAB | Refills: 1 | Status: SHIPPED | OUTPATIENT
Start: 2020-09-09 | End: 2021-04-15 | Stop reason: SDUPTHER

## 2020-09-09 NOTE — PROGRESS NOTES
Noemi Pedroza presents today for   Chief Complaint   Patient presents with    Back Pain     fu       Is someone accompanying this pt? NO    Is the patient using any DME equipment during OV? YES, one point cane    Depression Screening:  3 most recent PHQ Screens 9/9/2020   Little interest or pleasure in doing things Not at all   Feeling down, depressed, irritable, or hopeless Not at all   Total Score PHQ 2 0       Fall Risk  Fall Risk Assessment, last 12 mths 9/9/2020   Able to walk? Yes   Fall in past 12 months? No         Coordination of Care:  1. Have you been to the ER, urgent care clinic since your last visit? NO  Hospitalized since your last visit? NO    2. Have you seen or consulted any other health care providers outside of the [address] [place] [address] since your last visit? NO Include any pap smears or colon screening.  NO    Last  Checked 9/9/2020

## 2020-09-09 NOTE — PROGRESS NOTES
[facility], [place] [address].  [address], [address]  [facility], [address]  Phone: [phone]  Fax: [phone]        Hernesto Dickson  : 1955  PCP: None    PROGRESS NOTE      ASSESSMENT AND PLAN    Diagnoses and all orders for this visit:    1. Cervical stenosis of spine, C3-C4, jx  -     methocarbamoL (Robaxin) 500 mg tablet; Take 1 Tab by mouth three (3) times daily as needed for Muscle Spasm(s). Take 1 tab po BID as needed for pain/spasms      1. 76 y.o. male with known junctional cervical and lumbar stenosis. No progression of neurologic deficit. 2. Advised to continue HEP  3. Instructed morning stretches  4. Continue Robaxin  5. Given information on low back exercises    Follow-up and Dispositions    · Return in 6 months (on 3/9/2021). HISTORY OF PRESENT ILLNESS  Anupama Rivas is a 76 y.o. male. Pt was last evaluated 2020 for cervical stenosis. Permanently disabled note given. Pt states that he has been taking it \"one day at a time. \" He reports that his pain and numbness/tingling is about the same. Describes the numbness/tingling as constant in his feet, with intermittent hand numbness. He affirms that his walking tolerance is about 1-1.5 blocks. Denies any falls. Pain Assessment  2020   Location of Pain Back   Location Modifiers -   Severity of Pain 8   Quality of Pain Dull   Quality of Pain Comment numbness tingling   Duration of Pain Persistent   Frequency of Pain Constant   Aggravating Factors Standing;Walking   Aggravating Factors Comment -   Limiting Behavior Some   Relieving Factors Rest   Relieving Factors Comment robaxin   Result of Injury No   Work-Related Injury -   Type of Injury -     Does pain radiate into extremities: BLE  Numbness/tingling: B/L feet to thighs. B/L hands.   Does patient have weakness: hand weakness-drops objects some  Pt denies saddle paresthesias.    Medications pt is on:  & Robaxin TID-QID-with great benefit, denies dizziness/somnolence. Denies persistent fevers, chills, weight changes, neurogenic bowel or bladder symptoms.      Treatments patient has tried:  Physical therapy:Yes  Doing HEP: No  Non-opioid medications: Yes. Previously on Gabapentin 300 mg TID-minimal benefit. Spinal injections: Unknown    Spinal surgery- yes. ACDF C4-7 2012 with residuals. L4-5-S1 fusion 2011 Dr. Kahlil Villar    Last C MRI 5/2020: Intact fusion from C4-C7. Mod stenosis at C3-C4  Last L MRI 5/2020:  Fusion L4-S1 intact. Severe stenosis at L3-L4.      reviewed. No entries. PMHx of neuropathy. Pt is . No disability rating at the time he  from service for back/neck. Last worked 2012 as a . He enjoys watching sports in his free time. PAST MEDICAL HISTORY   Past Medical History:   Diagnosis Date    Neuropathy        Past Surgical History:   Procedure Laterality Date    HX APPENDECTOMY      HX CERVICAL FUSION  2012    Sury Smith  2011    Dr. Kahlil Villar   . MEDICATIONS      Current Outpatient Medications   Medication Sig Dispense Refill    methocarbamoL (Robaxin) 500 mg tablet Take 1 Tab by mouth three (3) times daily as needed for Muscle Spasm(s). Take 1 tab po BID as needed for pain/spasms 270 Tab 1    triamcinolone acetonide (KENALOG) 0.025 % topical cream Apply  to affected area two (2) times a day. use thin layer 15 g 0        Controlled Substance Monitoring:    No flowsheet data found.      ALLERGIES  No Known Allergies       SOCIAL HISTORY    Social History     Socioeconomic History    Marital status:      Spouse name: Not on file    Number of children: Not on file    Years of education: Not on file    Highest education level: Not on file   Occupational History    Not on file   Social Needs    Financial resource strain: Not on file    Food insecurity     Worry: Not on file     Inability: Not on file    Transportation needs     Medical: Not on file     Non-medical: Not on file Tobacco Use    Smoking status: Current Every Day Smoker     Packs/day: 0.50    Smokeless tobacco: Never Used   Substance and Sexual Activity    Alcohol use: Yes     Alcohol/week: 0.8 standard drinks     Types: 1 Cans of beer per week    Drug use: No    Sexual activity: Not on file   Lifestyle    Physical activity     Days per week: Not on file     Minutes per session: Not on file    Stress: Not on file   Relationships    Social connections     Talks on phone: Not on file     Gets together: Not on file     Attends Tenriism service: Not on file     Active member of club or organization: Not on file     Attends meetings of clubs or organizations: Not on file     Relationship status: Not on file    Intimate partner violence     Fear of current or ex partner: Not on file     Emotionally abused: Not on file     Physically abused: Not on file     Forced sexual activity: Not on file   Other Topics Concern    Not on file   Social History Narrative    Not on file       FAMILY HISTORY  History reviewed. No pertinent family history. REVIEW OF SYSTEMS  Review of Systems   Constitutional: Negative for chills, fever and weight loss. Respiratory: Negative for shortness of breath. Cardiovascular: Negative for chest pain. Gastrointestinal: Negative for constipation. Negative for fecal incontinence   Genitourinary: Negative for dysuria. Negative for urinary incontinence   Musculoskeletal: Positive for back pain and neck pain. Per HPI   Skin: Negative for rash. Neurological: Positive for tingling and focal weakness. Negative for dizziness, tremors and headaches. Endo/Heme/Allergies: Does not bruise/bleed easily. Psychiatric/Behavioral: The patient does not have insomnia.         PHYSICAL EXAMINATION  Visit Vitals  /67 (BP 1 Location: Left arm, BP Patient Position: Sitting)   Pulse 86   Temp 97.8 °F (36.6 °C) (Temporal)   Resp 16   Wt 179 lb (81.2 kg)   BMI 25.68 kg/m² Accompanied by self. Constitutional:  Well developed, well nourished, in no acute distress. Psychiatric: Affect and mood are appropriate. Integumentary: No rashes or abrasions noted on exposed areas. Cardiovascular/Peripheral Vascular: No peripheral edema is noted BLE. SPINE/MUSCULOSKELETAL EXAM    Cervical spine:  Neck is midline. Normal muscle tone. No focal atrophy is noted. Tenderness to palpation R upper trap. Negative Spurling's sign. Negative Tinel's sign. Negative Parra's sign. Lumbar spine:  No rash, ecchymosis, or gross obliquity. No fasciculations. No focal atrophy is noted. Tenderness to palpation L4-5. No tenderness to palpation at the sciatic notch. SI joints non-tender. Trochanters non tender. Difficulty sit to stand. MOTOR:      Biceps  Triceps Deltoids Wrist Ext Wrist Flex Hand Intrin   Right 4/5 4/5 4/5 4/5 4/5 -4/5   Left 4/5 4/5 4/5 4/5 4/5 -4/5        Hip Flex Quads Hamstrings Ankle DF EHL Ankle PF   Right 4/5 4/5 4/5 4/5 4/5 4/5   Left 4/5 4/5 4/5 4/5 4/5 4/5     Hamstring tightness bilaterally. DTRs are hypoactive biceps, triceps, brachioradialis, patella, and Achilles. Straight Leg raise negative. Ambulation with single point cane. Slight shuffling, FF. FWB. Written by Terri Callejas, as dictated by Anita Santiago MD.    I, Dr. Anita Santiago MD, confirm that all documentation is accurate. Mr. Jairon Fallon may have a reminder for a \"due or due soon\" health maintenance. I have asked that he contact his primary care provider for follow-up on this health maintenance.

## 2020-09-09 NOTE — PATIENT INSTRUCTIONS

## 2020-09-11 ENCOUNTER — TELEPHONE (OUTPATIENT)
Dept: ORTHOPEDIC SURGERY | Age: 65
End: 2020-09-11

## 2020-09-11 NOTE — TELEPHONE ENCOUNTER
Methocarbamol is non-formulary per the insurance. Pharmacy is requesting an alternative, or should a PA be attempted?

## 2020-09-11 NOTE — TELEPHONE ENCOUNTER
Prior Rachel Moore was initiated in cover my meds and sent to pt's insurance plan. Waiting in insurance response.

## 2021-04-15 ENCOUNTER — HOME HEALTH ADMISSION (OUTPATIENT)
Dept: HOME HEALTH SERVICES | Facility: HOME HEALTH | Age: 66
End: 2021-04-15

## 2021-04-15 ENCOUNTER — OFFICE VISIT (OUTPATIENT)
Dept: ORTHOPEDIC SURGERY | Age: 66
End: 2021-04-15
Payer: MEDICARE

## 2021-04-15 VITALS
HEIGHT: 70 IN | BODY MASS INDEX: 26.05 KG/M2 | OXYGEN SATURATION: 98 % | TEMPERATURE: 97.2 F | HEART RATE: 85 BPM | WEIGHT: 182 LBS

## 2021-04-15 DIAGNOSIS — M48.02 CERVICAL STENOSIS OF SPINE: ICD-10-CM

## 2021-04-15 DIAGNOSIS — M48.062 LUMBAR STENOSIS WITH NEUROGENIC CLAUDICATION: Primary | ICD-10-CM

## 2021-04-15 PROCEDURE — 1101F PT FALLS ASSESS-DOCD LE1/YR: CPT | Performed by: PHYSICAL MEDICINE & REHABILITATION

## 2021-04-15 PROCEDURE — G8427 DOCREV CUR MEDS BY ELIG CLIN: HCPCS | Performed by: PHYSICAL MEDICINE & REHABILITATION

## 2021-04-15 PROCEDURE — G8536 NO DOC ELDER MAL SCRN: HCPCS | Performed by: PHYSICAL MEDICINE & REHABILITATION

## 2021-04-15 PROCEDURE — 99214 OFFICE O/P EST MOD 30 MIN: CPT | Performed by: PHYSICAL MEDICINE & REHABILITATION

## 2021-04-15 PROCEDURE — G8419 CALC BMI OUT NRM PARAM NOF/U: HCPCS | Performed by: PHYSICAL MEDICINE & REHABILITATION

## 2021-04-15 PROCEDURE — 3017F COLORECTAL CA SCREEN DOC REV: CPT | Performed by: PHYSICAL MEDICINE & REHABILITATION

## 2021-04-15 PROCEDURE — G8432 DEP SCR NOT DOC, RNG: HCPCS | Performed by: PHYSICAL MEDICINE & REHABILITATION

## 2021-04-15 RX ORDER — ALBUTEROL SULFATE 90 UG/1
2 AEROSOL, METERED RESPIRATORY (INHALATION)
COMMUNITY
End: 2022-09-26 | Stop reason: SDUPTHER

## 2021-04-15 RX ORDER — METHOCARBAMOL 500 MG/1
500 TABLET, FILM COATED ORAL
Qty: 180 TAB | Refills: 0 | Status: SHIPPED | OUTPATIENT
Start: 2021-04-15 | End: 2022-09-26 | Stop reason: SDUPTHER

## 2021-04-15 RX ORDER — LISINOPRIL 10 MG/1
10 TABLET ORAL DAILY
COMMUNITY
End: 2022-09-26 | Stop reason: SDUPTHER

## 2021-04-15 RX ORDER — GABAPENTIN 300 MG/1
300 CAPSULE ORAL 3 TIMES DAILY
COMMUNITY
End: 2022-09-26 | Stop reason: ALTCHOICE

## 2021-04-15 NOTE — PROGRESS NOTES
Samantha Lopez presents today for   Chief Complaint   Patient presents with    Back Pain       Is someone accompanying this pt? Yes, female    Is the patient using any DME equipment during OV? Yes, cane    Depression Screening:  3 most recent PHQ Screens 9/9/2020   Little interest or pleasure in doing things Not at all   Feeling down, depressed, irritable, or hopeless Not at all   Total Score PHQ 2 0       Fall Risk  Fall Risk Assessment, last 12 mths 9/9/2020   Able to walk? Yes   Fall in past 12 months? No         Coordination of Care:  1. Have you been to the ER, urgent care clinic since your last visit? no  Hospitalized since your last visit? no    2. Have you seen or consulted any other health care providers outside of the [address] [place] [address] since your last visit? Yes, neurosurgery. Include any pap smears or colon screening.  no

## 2021-04-15 NOTE — PATIENT INSTRUCTIONS

## 2021-04-15 NOTE — PROGRESS NOTES
[address]  [address], [address]  [place]  Phone: [phone]  Fax: [phone]        Sebastian Friday  : 1955  PCP: Carla Vidal MD    PROGRESS NOTE      ASSESSMENT AND PLAN    Diagnoses and all orders for this visit:    1. Lumbar stenosis with neurogenic claudication  -     REFERRAL TO HOME HEALTH    2. Cervical stenosis of spine, C3-C4, jx  -     methocarbamoL (Robaxin) 500 mg tablet; Take 1 Tab by mouth two (2) times daily as needed for Muscle Spasm(s). Take 1 tab po BID as needed for pain/spasms  -     REFERRAL TO HOME HEALTH        1. Leopold Martin. is a 76 y.o. male with declining function. He may be having some cognitive impairments as well as ambulatory dysfunction. He does not seem to be bothered much by his pain, however wife is very concerned about his declining health. 2. Advised to stay active as tolerated. 3. Referred to home health for ADL and assistive device evaluation, gait retraining, and safety. 4. Continue Robaxin as needed, advised to fall risk. 5. F/u with Dr. Shannon Lott, his previous surgeon who was recently ordered a new MRI. 6. Given information on fall prevention      Follow-up and Dispositions    · Return if symptoms worsen or fail to improve. HISTORY OF PRESENT ILLNESS      Anupama Schulte. is a 76 y.o. male presents for follow up for lumbar stenosis, last seen 2020. Pt wife mentions that he has deteriorated since his last office visit. She reports that she has seen a change in his daily life, noting that he requires more help than he used with his memory, bathing, and changing clothes. Affirms increased falls and stumbling. He reports that his knees hurt in the morning, noting that it takes him time to warm up. Walking tolerance has gotten shorter to about one block. Patient reports that he used to easily walk 2 the corner store about a block away, now he finds it difficult.   Prolonged walking causes him to feel physically tired in the back and BLE. He admits to some B/L hand weakness. Denies hx of depression, TIA, MI, COPD, or asthma. Patient denies memory loss, although his spouse disagrees. Patient is not interested in further surgery. Spouse wants to make a claim with the [place] regarding his neck and low back surgery. Patient did not have a disability rating for his cervical or lumbar spine at time of separation. I have advised her that he had a degenerative condition and that I could not causally relate this back to his time in the service. Recent visit with Dr. Kirill Chaudhry, note reviewed,, new MRI ordered. Pain Assessment  4/15/2021   Location of Pain Back   Location Modifiers -   Severity of Pain 8   Quality of Pain Dull; Other (Comment)   Quality of Pain Comment n/t to legs at times   Duration of Pain Persistent   Frequency of Pain Constant   Aggravating Factors Other (Comment)   Aggravating Factors Comment constant walking   Limiting Behavior Yes   Relieving Factors Nothing   Relieving Factors Comment -   Result of Injury No   Work-Related Injury -   Type of Injury -       Does pain radiate into extremities: BLE  Numbness/tingling: B/L feet to thighs. B/L hands. Does patient have weakness: hand weakness  Denies red flags including: persistent fevers, chills, weight changes, saddle paresthesias, and neurogenic bowel or bladder symptoms.      Treatments patient has tried:  Physical therapy:Yes  Doing HEP: No  Beneficial medications: Robaxin 500 mg PRN  Failed medications: Gabapentin 300 mg TID-minimal benefit. Spinal injections: Unknown     Spinal surgery- yes. ACDF C4-7 2012 with residuals. L4-5-S1 fusion 2011 Dr. Kirill Chaudhry    C MRI 5/2020: Intact fusion from C4-C7. Mod stenosis at C3-C4  L MRI 5/2020: Fusion L4-S1 intact. Severe stenosis at L3-L4.      reviewed. No entries. PMHx of neuropathy. Pt is . No disability rating at the time he  from service for back/neck. Last worked 2012 as a . He enjoys watching sports in his free time. PAST MEDICAL HISTORY   Past Medical History:   Diagnosis Date    Neuropathy         Past Surgical History:   Procedure Laterality Date    HX APPENDECTOMY      HX CERVICAL FUSION  2012    Davey Starr  2011    Dr. Demi Chand      Current Outpatient Medications   Medication Sig Dispense Refill    albuterol (PROVENTIL HFA, VENTOLIN HFA, PROAIR HFA) 90 mcg/actuation inhaler Take 2 Puffs by inhalation every six (6) hours as needed.  gabapentin (NEURONTIN) 300 mg capsule Take 300 mg by mouth three (3) times daily.  lisinopriL (PRINIVIL, ZESTRIL) 10 mg tablet Take 10 mg by mouth daily.  methocarbamoL (Robaxin) 500 mg tablet Take 1 Tab by mouth two (2) times daily as needed for Muscle Spasm(s). Take 1 tab po BID as needed for pain/spasms 180 Tab 0    triamcinolone acetonide (KENALOG) 0.025 % topical cream Apply  to affected area two (2) times a day. use thin layer 15 g 0       Controlled Substance Monitoring:    No flowsheet data found. ALLERGIES  No Known Allergies       PHYSICAL EXAMINATION  Visit Vitals  Pulse 85   Temp 97.2 °F (36.2 °C) (Tympanic)   Ht 5' 10\" (1.778 m)   Wt 182 lb (82.6 kg)   SpO2 98% Comment: RA   BMI 26.11 kg/m²       SPINE/MUSCULOSKELETAL EXAM    Middle-aged gentleman in no acute distress. He has somewhat delayed but appropriate responses. UE and LE strength 4 out of 5. Intrinsics 4- out of 5. Ambulating with single point cane. Written by Juan Ramon Massey, as dictated by Caern Meneses MD.    I, Dr. Caren Meneses MD, confirm that all documentation is accurate. Mr. Savannah Munoz may have a reminder for a \"due or due soon\" health maintenance. I have asked that he contact his primary care provider for follow-up on this health maintenance. This note was created using Dragon transcription software, unintended errors may be present.

## 2021-04-16 ENCOUNTER — HOME CARE VISIT (OUTPATIENT)
Dept: HOME HEALTH SERVICES | Facility: HOME HEALTH | Age: 66
End: 2021-04-16

## 2021-04-19 ENCOUNTER — HOME CARE VISIT (OUTPATIENT)
Dept: HOME HEALTH SERVICES | Facility: HOME HEALTH | Age: 66
End: 2021-04-19

## 2021-04-20 ENCOUNTER — HOME CARE VISIT (OUTPATIENT)
Dept: SCHEDULING | Facility: HOME HEALTH | Age: 66
End: 2021-04-20

## 2021-04-20 ENCOUNTER — HOME CARE VISIT (OUTPATIENT)
Dept: HOME HEALTH SERVICES | Facility: HOME HEALTH | Age: 66
End: 2021-04-20

## 2021-04-20 NOTE — PROGRESS NOTES
Called the number indicated by CG to be the patient's cell number. This is 3rd attempt to reach him. PT was told Kinsey Hash is noit here\". (I was told that yesterday, \"he's in the house\", but they did not attempt to get him, said they would have him call back but he did not.)    Today, PT requested that the person relay message that he call us to let us know whether he wants [address] [place] [address] or not. I suspect that the patient is not interested in [address], but I will give him until the end of the day to respond to me before making him a non-admit.

## 2021-04-21 NOTE — PROGRESS NOTES
Patient is Non-admit:  Multiple calls, messages to caregivers and VM, and no return call. Non-admit due to unable to reach patient/patient not interested in HHPT.

## 2021-06-03 ENCOUNTER — DOCUMENTATION ONLY (OUTPATIENT)
Dept: ORTHOPEDIC SURGERY | Age: 66
End: 2021-06-03

## 2021-06-03 NOTE — PROGRESS NOTES
Received records request from [facility]  Colby Urbano Faxed to [place] [phone] to complete. Sent to be scanned to chart.

## 2022-09-21 DIAGNOSIS — Z13.0 SCREENING, ANEMIA, DEFICIENCY, IRON: ICD-10-CM

## 2022-09-21 DIAGNOSIS — Z11.59 ENCOUNTER FOR HEPATITIS C SCREENING TEST FOR LOW RISK PATIENT: ICD-10-CM

## 2022-09-21 DIAGNOSIS — R73.09 OTHER ABNORMAL GLUCOSE: ICD-10-CM

## 2022-09-21 DIAGNOSIS — Z13.1 SCREENING FOR DIABETES MELLITUS: ICD-10-CM

## 2022-09-21 DIAGNOSIS — Z13.6 SCREENING, ISCHEMIC HEART DISEASE: Primary | ICD-10-CM

## 2022-09-21 DIAGNOSIS — I10 HYPERTENSION, UNSPECIFIED TYPE: ICD-10-CM

## 2022-09-26 ENCOUNTER — HOSPITAL ENCOUNTER (OUTPATIENT)
Dept: LAB | Age: 67
Discharge: HOME | End: 2022-09-26
Payer: MEDICARE

## 2022-09-26 ENCOUNTER — OFFICE VISIT (OUTPATIENT)
Dept: INTERNAL MEDICINE CLINIC | Age: 67
End: 2022-09-26
Payer: MEDICARE

## 2022-09-26 VITALS
TEMPERATURE: 97 F | RESPIRATION RATE: 18 BRPM | HEIGHT: 70 IN | DIASTOLIC BLOOD PRESSURE: 74 MMHG | BODY MASS INDEX: 25.77 KG/M2 | OXYGEN SATURATION: 95 % | SYSTOLIC BLOOD PRESSURE: 146 MMHG | WEIGHT: 180 LBS | HEART RATE: 77 BPM

## 2022-09-26 DIAGNOSIS — L73.9 FOLLICULITIS: ICD-10-CM

## 2022-09-26 DIAGNOSIS — M48.02 CERVICAL STENOSIS OF SPINE: ICD-10-CM

## 2022-09-26 DIAGNOSIS — G89.29 CHRONIC PAIN OF RIGHT KNEE: ICD-10-CM

## 2022-09-26 DIAGNOSIS — F17.200 TOBACCO USE DISORDER: ICD-10-CM

## 2022-09-26 DIAGNOSIS — G89.29 CHRONIC MIDLINE LOW BACK PAIN WITHOUT SCIATICA: ICD-10-CM

## 2022-09-26 DIAGNOSIS — R41.3 MEMORY CHANGES: ICD-10-CM

## 2022-09-26 DIAGNOSIS — I10 HYPERTENSION, UNSPECIFIED TYPE: ICD-10-CM

## 2022-09-26 DIAGNOSIS — Z13.6 SCREENING, ISCHEMIC HEART DISEASE: ICD-10-CM

## 2022-09-26 DIAGNOSIS — R73.09 OTHER ABNORMAL GLUCOSE: ICD-10-CM

## 2022-09-26 DIAGNOSIS — R06.83 SNORING: ICD-10-CM

## 2022-09-26 DIAGNOSIS — M54.50 CHRONIC MIDLINE LOW BACK PAIN WITHOUT SCIATICA: ICD-10-CM

## 2022-09-26 DIAGNOSIS — Z76.89 ESTABLISHING CARE WITH NEW DOCTOR, ENCOUNTER FOR: ICD-10-CM

## 2022-09-26 DIAGNOSIS — R06.09 DOE (DYSPNEA ON EXERTION): ICD-10-CM

## 2022-09-26 DIAGNOSIS — Z11.59 ENCOUNTER FOR HEPATITIS C SCREENING TEST FOR LOW RISK PATIENT: ICD-10-CM

## 2022-09-26 DIAGNOSIS — Z87.891 PERSONAL HISTORY OF TOBACCO USE, PRESENTING HAZARDS TO HEALTH: ICD-10-CM

## 2022-09-26 DIAGNOSIS — E78.2 MIXED HYPERLIPIDEMIA: ICD-10-CM

## 2022-09-26 DIAGNOSIS — G89.29 OTHER CHRONIC PAIN: ICD-10-CM

## 2022-09-26 DIAGNOSIS — Z13.0 SCREENING, ANEMIA, DEFICIENCY, IRON: ICD-10-CM

## 2022-09-26 DIAGNOSIS — F43.10 PTSD (POST-TRAUMATIC STRESS DISORDER): ICD-10-CM

## 2022-09-26 DIAGNOSIS — Z13.1 SCREENING FOR DIABETES MELLITUS: ICD-10-CM

## 2022-09-26 DIAGNOSIS — M25.561 CHRONIC PAIN OF RIGHT KNEE: ICD-10-CM

## 2022-09-26 DIAGNOSIS — I10 PRIMARY HYPERTENSION: Primary | ICD-10-CM

## 2022-09-26 DIAGNOSIS — Z71.6 ENCOUNTER FOR TOBACCO USE CESSATION COUNSELING: ICD-10-CM

## 2022-09-26 LAB
ALBUMIN SERPL-MCNC: 3.8 G/DL (ref 3.4–5)
ALBUMIN/GLOB SERPL: 0.9 {RATIO} (ref 0.8–1.7)
ALP SERPL-CCNC: 96 U/L (ref 45–117)
ALT SERPL-CCNC: 27 U/L (ref 16–61)
ANION GAP SERPL CALC-SCNC: 4 MMOL/L (ref 3–18)
AST SERPL-CCNC: 21 U/L (ref 10–38)
BASOPHILS # BLD: 0.1 K/UL (ref 0–0.1)
BASOPHILS NFR BLD: 1 % (ref 0–2)
BILIRUB SERPL-MCNC: 0.2 MG/DL (ref 0.2–1)
BUN SERPL-MCNC: 27 MG/DL (ref 7–18)
BUN/CREAT SERPL: 23 (ref 12–20)
CALCIUM SERPL-MCNC: 10.1 MG/DL (ref 8.5–10.1)
CHLORIDE SERPL-SCNC: 98 MMOL/L (ref 100–111)
CHOLEST SERPL-MCNC: 147 MG/DL
CO2 SERPL-SCNC: 31 MMOL/L (ref 21–32)
CREAT SERPL-MCNC: 1.2 MG/DL (ref 0.6–1.3)
DIFFERENTIAL METHOD BLD: ABNORMAL
EOSINOPHIL # BLD: 0.4 K/UL (ref 0–0.4)
EOSINOPHIL NFR BLD: 4 % (ref 0–5)
ERYTHROCYTE [DISTWIDTH] IN BLOOD BY AUTOMATED COUNT: 13.9 % (ref 11.6–14.5)
EST. AVERAGE GLUCOSE BLD GHB EST-MCNC: 120 MG/DL
GLOBULIN SER CALC-MCNC: 4.4 G/DL (ref 2–4)
GLUCOSE SERPL-MCNC: 68 MG/DL (ref 74–99)
HBA1C MFR BLD: 5.8 % (ref 4.2–5.6)
HCT VFR BLD AUTO: 41.9 % (ref 36–48)
HDLC SERPL-MCNC: 43 MG/DL (ref 40–60)
HDLC SERPL: 3.4 {RATIO} (ref 0–5)
HGB BLD-MCNC: 13.3 G/DL (ref 13–16)
IMM GRANULOCYTES # BLD AUTO: 0 K/UL (ref 0–0.04)
IMM GRANULOCYTES NFR BLD AUTO: 1 % (ref 0–0.5)
LDLC SERPL CALC-MCNC: 62 MG/DL (ref 0–100)
LIPID PROFILE,FLP: ABNORMAL
LYMPHOCYTES # BLD: 2.6 K/UL (ref 0.9–3.6)
LYMPHOCYTES NFR BLD: 31 % (ref 21–52)
MCH RBC QN AUTO: 28.3 PG (ref 24–34)
MCHC RBC AUTO-ENTMCNC: 31.7 G/DL (ref 31–37)
MCV RBC AUTO: 89.1 FL (ref 78–100)
MONOCYTES # BLD: 0.9 K/UL (ref 0.05–1.2)
MONOCYTES NFR BLD: 10 % (ref 3–10)
NEUTS SEG # BLD: 4.4 K/UL (ref 1.8–8)
NEUTS SEG NFR BLD: 53 % (ref 40–73)
NRBC # BLD: 0 K/UL (ref 0–0.01)
NRBC BLD-RTO: 0 PER 100 WBC
PLATELET # BLD AUTO: 393 K/UL (ref 135–420)
PMV BLD AUTO: 10.9 FL (ref 9.2–11.8)
POTASSIUM SERPL-SCNC: 4.5 MMOL/L (ref 3.5–5.5)
PROT SERPL-MCNC: 8.2 G/DL (ref 6.4–8.2)
RBC # BLD AUTO: 4.7 M/UL (ref 4.35–5.65)
SODIUM SERPL-SCNC: 133 MMOL/L (ref 136–145)
TRIGL SERPL-MCNC: 210 MG/DL (ref ?–150)
VLDLC SERPL CALC-MCNC: 42 MG/DL
WBC # BLD AUTO: 8.3 K/UL (ref 4.6–13.2)

## 2022-09-26 PROCEDURE — 83036 HEMOGLOBIN GLYCOSYLATED A1C: CPT

## 2022-09-26 PROCEDURE — 86803 HEPATITIS C AB TEST: CPT

## 2022-09-26 PROCEDURE — 1123F ACP DISCUSS/DSCN MKR DOCD: CPT | Performed by: STUDENT IN AN ORGANIZED HEALTH CARE EDUCATION/TRAINING PROGRAM

## 2022-09-26 PROCEDURE — 80053 COMPREHEN METABOLIC PANEL: CPT

## 2022-09-26 PROCEDURE — G8427 DOCREV CUR MEDS BY ELIG CLIN: HCPCS | Performed by: STUDENT IN AN ORGANIZED HEALTH CARE EDUCATION/TRAINING PROGRAM

## 2022-09-26 PROCEDURE — G8417 CALC BMI ABV UP PARAM F/U: HCPCS | Performed by: STUDENT IN AN ORGANIZED HEALTH CARE EDUCATION/TRAINING PROGRAM

## 2022-09-26 PROCEDURE — 36415 COLL VENOUS BLD VENIPUNCTURE: CPT

## 2022-09-26 PROCEDURE — 85025 COMPLETE CBC W/AUTO DIFF WBC: CPT

## 2022-09-26 PROCEDURE — 99204 OFFICE O/P NEW MOD 45 MIN: CPT | Performed by: STUDENT IN AN ORGANIZED HEALTH CARE EDUCATION/TRAINING PROGRAM

## 2022-09-26 PROCEDURE — 99406 BEHAV CHNG SMOKING 3-10 MIN: CPT | Performed by: STUDENT IN AN ORGANIZED HEALTH CARE EDUCATION/TRAINING PROGRAM

## 2022-09-26 PROCEDURE — 3017F COLORECTAL CA SCREEN DOC REV: CPT | Performed by: STUDENT IN AN ORGANIZED HEALTH CARE EDUCATION/TRAINING PROGRAM

## 2022-09-26 PROCEDURE — 1101F PT FALLS ASSESS-DOCD LE1/YR: CPT | Performed by: STUDENT IN AN ORGANIZED HEALTH CARE EDUCATION/TRAINING PROGRAM

## 2022-09-26 PROCEDURE — 80061 LIPID PANEL: CPT

## 2022-09-26 PROCEDURE — G0296 VISIT TO DETERM LDCT ELIG: HCPCS | Performed by: STUDENT IN AN ORGANIZED HEALTH CARE EDUCATION/TRAINING PROGRAM

## 2022-09-26 PROCEDURE — G8536 NO DOC ELDER MAL SCRN: HCPCS | Performed by: STUDENT IN AN ORGANIZED HEALTH CARE EDUCATION/TRAINING PROGRAM

## 2022-09-26 RX ORDER — DIAZEPAM 10 MG/1
10 TABLET ORAL
COMMUNITY
End: 2022-09-26 | Stop reason: ALTCHOICE

## 2022-09-26 RX ORDER — FLUTICASONE PROPIONATE 110 UG/1
1 AEROSOL, METERED RESPIRATORY (INHALATION) EVERY 12 HOURS
Qty: 12 G | Refills: 1 | Status: SHIPPED | OUTPATIENT
Start: 2022-09-26

## 2022-09-26 RX ORDER — LISINOPRIL 10 MG/1
10 TABLET ORAL DAILY
Qty: 90 TABLET | Refills: 1 | Status: SHIPPED | OUTPATIENT
Start: 2022-09-26

## 2022-09-26 RX ORDER — TRIAMCINOLONE ACETONIDE 0.25 MG/G
CREAM TOPICAL 2 TIMES DAILY
Qty: 15 G | Refills: 0 | Status: SHIPPED | OUTPATIENT
Start: 2022-09-26

## 2022-09-26 RX ORDER — CETIRIZINE HCL 10 MG
TABLET ORAL
COMMUNITY
End: 2022-09-26 | Stop reason: SDUPTHER

## 2022-09-26 RX ORDER — ALBUTEROL SULFATE 90 UG/1
2 AEROSOL, METERED RESPIRATORY (INHALATION)
Qty: 18 G | Refills: 2 | Status: SHIPPED | OUTPATIENT
Start: 2022-09-26

## 2022-09-26 RX ORDER — ATORVASTATIN CALCIUM 10 MG/1
10 TABLET, FILM COATED ORAL DAILY
Qty: 90 TABLET | Refills: 0 | Status: SHIPPED | OUTPATIENT
Start: 2022-09-26

## 2022-09-26 RX ORDER — METHOCARBAMOL 500 MG/1
500 TABLET, FILM COATED ORAL
Qty: 180 TABLET | Refills: 1 | Status: SHIPPED | OUTPATIENT
Start: 2022-09-26

## 2022-09-26 RX ORDER — CETIRIZINE HCL 10 MG
10 TABLET ORAL DAILY
Qty: 90 TABLET | Refills: 0 | Status: SHIPPED | OUTPATIENT
Start: 2022-09-26

## 2022-09-26 RX ORDER — ATORVASTATIN CALCIUM 10 MG/1
TABLET, FILM COATED ORAL DAILY
COMMUNITY
End: 2022-09-26 | Stop reason: SDUPTHER

## 2022-09-26 NOTE — PROGRESS NOTES
Catana Hammans. is a 75 y.o. male (: 1955) presenting to address:    Chief Complaint   Patient presents with    New Patient    Establish Care    Back Pain     Patient c/o back and Rt knee pain                                               pain scale 7/10       Vitals:    22 1010   BP: (!) 146/74   Pulse: 77   Resp: 18   Temp: 97 °F (36.1 °C)   TempSrc: Temporal   SpO2: 95%   Weight: 180 lb (81.6 kg)   Height: 5' 10\" (1.778 m)   PainSc:   7   PainLoc: Back       Hearing/Vision:   No results found. Learning Assessment:   No flowsheet data found. Depression Screening:     3 most recent PHQ Screens 2022   Little interest or pleasure in doing things Not at all   Feeling down, depressed, irritable, or hopeless Not at all   Total Score PHQ 2 0     Fall Risk Assessment:     Fall Risk Assessment, last 12 mths 2022   Able to walk? Yes   Fall in past 12 months? 0     Abuse Screening:   No flowsheet data found. Coordination of Care Questionaire:     Advanced Directive:   1. Do you have an Advanced Directive? NO    2. Would you like information on Advanced Directives? NO    1. \"Have you been to the ER, urgent care clinic since your last visit? Hospitalized since your last visit? \" No    2. \"Have you seen or consulted any other health care providers outside of the [address] [place] [address] since your last visit? \" No     3. For patients aged 39-70: Has the patient had a colonoscopy? Yes - no Care Gap present     If the patient is female:    4. For patients aged 41-77: Has the patient had a mammogram within the past 2 years? No    5. For patients aged 21-65: Has the patient had a pap smear?  No

## 2022-09-26 NOTE — PROGRESS NOTES
HISTORY OF PRESENT ILLNESS  Anupama Rain is a 75 y.o. male. New pt here to [facility]    Presents with his wife today. Last time saw provider was 1 yr ago. Taking medications on and off since then    HTN- Checks BP at home. Up and down numbers. Currently taking Lisinopril 10mg. Recently started watching salt in diet and working on increasing physical activity    Chronic pain- Had a lumbar fusion in 2011 land cervical fusion in 2012 due to spinal stenosis. Hasnt done any PT. Takes Methocarbamol 500mg which helps. Previously rx diazepam and gabapentin both of which he hasnt been taking    Tobacco abuse- 1 pp every 2-3 days. For over 35 yrs    PTSD- Dx after being in the [employer] for 2 yrs. Has never seen a therapist      Presents today with memory concerns: Started about 3 yrs ago but getting worse this last year. Forgets to take medications. Hx of PTSD. Wife admits to agitation, finds himself pondering a lot trying to remember what he was going to say, others have noticed as well. He also nods off a lot           Review of Systems   HENT:  Negative for hearing loss. Eyes:  Negative for blurred vision. Respiratory:  Positive for shortness of breath. Wheezing: On occasion. Cardiovascular:  Negative for chest pain and palpitations. Gastrointestinal:  Negative for abdominal pain. Musculoskeletal:  Positive for back pain, joint pain and neck pain. Neurological:  Negative for dizziness and headaches. BP (!) 146/74 (BP 1 Location: Right upper arm, BP Patient Position: Sitting, BP Cuff Size: Adult)   Pulse 77   Temp 97 °F (36.1 °C) (Temporal)   Resp 18   Ht 5' 10\" (1.778 m)   Wt 180 lb (81.6 kg)   SpO2 95%   BMI 25.83 kg/m²     Physical Exam  Vitals reviewed. Constitutional:       Appearance: Normal appearance.    HENT:      Right Ear: Tympanic membrane normal.      Left Ear: Tympanic membrane normal.      Mouth/Throat:      Comments: MASK  Eyes:      Conjunctiva/sclera: Conjunctivae normal. Pupils: Pupils are equal, round, and reactive to light. Cardiovascular:      Rate and Rhythm: Normal rate and regular rhythm. Pulses: Normal pulses. Heart sounds: Normal heart sounds. Pulmonary:      Effort: Pulmonary effort is normal.      Breath sounds: Normal breath sounds. Abdominal:      General: Abdomen is flat. Psychiatric:         Mood and Affect: Mood normal.     ASSESSMENT and PLAN    ICD-10-CM ICD-9-CM    1. Primary hypertension  I10 401.9       2. Mixed hyperlipidemia  E78.2 272.2 atorvastatin (LIPITOR) 10 mg tablet      3. Memory changes  R41.3 780.93 REFERRAL TO NEUROLOGY      4. PTSD (post-traumatic stress disorder)  F43.10 309.81 REFERRAL TO PSYCHIATRY      5. Cervical stenosis of spine, C3-C4, jx  M48.02 723.0 methocarbamoL (Robaxin) 500 mg tablet      6. Other chronic pain  G89.29 338.29 lisinopriL (PRINIVIL, ZESTRIL) 10 mg tablet      REFERRAL TO PHYSICAL THERAPY      REFERRAL TO ORTHOPEDICS      7. Chronic midline low back pain without sciatica  M54.50 724.2     G89.29 338.29       8. Chronic pain of right knee  M25.561 719.46     G89.29 338.29       9. Snoring  R06.83 786.09 SLEEP MEDICINE REFERRAL      10. Folliculitis  M12.1 075.7 triamcinolone acetonide (KENALOG) 0.025 % topical cream      11. WHITEHEAD (dyspnea on exertion)  R06.00 786.09 albuterol (PROVENTIL HFA, VENTOLIN HFA, PROAIR HFA) 90 mcg/actuation inhaler      fluticasone propionate (FLOVENT HFA) 110 mcg/actuation inhaler      12. Personal history of tobacco use, presenting hazards to health  Z87.891 V15.82 CT LOW DOSE LUNG CANCER SCREENING      13. Establishing care with new doctor, encounter for  Z76.89 V65.8       Continue Lisinopril 10mg. Discussed at length the importance of taking blood pressure medication and adhering to regimen. Discussed cardiovasuar health, medications to avoid and foods to eliminate from diet. Also discussed lifestyle adjustments in diet and exercise. Important to get 150min exercise a week. Encouraged pt to check BP at home twice daily to review BP at next visit. May need to increase if still elevated    Continue Lipitor 10mg. Rechecking lipids today    MME today was 30/30. Minimal signs of cognitive decline today. Placed referral to neuro for further evaluation    Referral to psych for chronic PTSD for CBT  Continue methocarbamol for chronic  pain. Referral to PT and ortho for further evaluation  Rx flovent for WHITEHEAD. Suspect a degree of emphysema from smoking. Discussed how to use inhalers. Albuterol PRN Ordered low dose CT. Referral to sleep medicine for snoring. Counseled on tobacco cessation, NRT and harm reduction. Encouraged to participate in nicotine quit now program. Discussed at length for at least 5 min   Reviewed prior notes, labs and imaging from previous providers  Declined vaccines  Follow-up and Dispositions    Return in about 1 month (around 10/26/2022) for HTN, BP recheck. Discussed with the patient the current USPSTF guidelines released March 9, 2021 for screening for lung cancer. For adults aged 48 to [de-identified] years who have a 20 pack-year smoking history and currently smoke or have quit within the past 15 years the grade B recommendation is to:  Screen for lung cancer with low-dose computed tomography (LDCT) every year. Stop screening once a person has not smoked for 15 years or has a health problem that limits life expectancy or the ability to have lung surgery. The patient has a 35 pack-year history of cigarette smoking and currently is smoking. Discussed with patient the risks and benefits of screening, including over-diagnosis, false positive rate, and total radiation exposure. The patient currently exhibits no signs or symptoms suggestive of lung cancer. Discussed with patient the importance of compliance with yearly annual lung cancer screenings and willingness to undergo diagnosis and treatment if screening scan is positive.   In addition, the patient was counseled regarding the importance of remaining smoke free and/or total smoking cessation.     Also reviewed the following if the patient has Medicare that as of February 10, 2022, Medicare only covers LDCT screening in patients aged 51-72 with at least a 20 pack-year smoking history who currently smoke or have quit in the last 15 years

## 2022-09-27 LAB
HCV AB SER IA-ACNC: <0.02 INDEX
HCV AB SERPL QL IA: NEGATIVE
HCV COMMENT,HCGAC: NORMAL

## 2022-09-28 NOTE — PROGRESS NOTES
Attempted to call pt. Labs show prediabetes elevated triglycerides and low sodium. Will recheck sodium at next visit.

## 2022-10-06 ENCOUNTER — APPOINTMENT (OUTPATIENT)
Dept: PHYSICAL THERAPY | Age: 67
End: 2022-10-06
Attending: STUDENT IN AN ORGANIZED HEALTH CARE EDUCATION/TRAINING PROGRAM

## 2022-10-10 ENCOUNTER — HOSPITAL ENCOUNTER (OUTPATIENT)
Dept: PHYSICAL THERAPY | Age: 67
Discharge: HOME | End: 2022-10-10
Attending: STUDENT IN AN ORGANIZED HEALTH CARE EDUCATION/TRAINING PROGRAM
Payer: MEDICARE

## 2022-10-10 PROCEDURE — 97110 THERAPEUTIC EXERCISES: CPT

## 2022-10-10 PROCEDURE — 97162 PT EVAL MOD COMPLEX 30 MIN: CPT

## 2022-10-10 NOTE — PROGRESS NOTES
PT DAILY TREATMENT NOTE - [facility]     Patient Name: Maryan Rahman. Date:10/10/2022  : 1955  [x]  Patient  Verified  Payor: [facility] MEDICARE / Plan: [facility] COMMUNITY PLAN MCR / Product Type: Managed Care Medicare /    In time:950 Out time:1035  Total Treatment Time (min): 45  Visit #: 1 of 12    Treatment Area: Other chronic pain [G89.29]    SUBJECTIVE  Pain Level (0-10 scale): 8/10  Any medication changes, allergies to medications, adverse drug reactions, diagnosis change, or new procedure performed?: [x] No    [] Yes (see summary sheet for update)  Subjective functional status/changes:   [] No changes reported     Chronic pain- Had a lumbar fusion in  land cervical fusion in  due to spinal stenosis. Hasnt done any PT. Takes Methocarbamol 500mg which helps. Previously rx diazepam and gabapentin both of which he hasnt been taking    OBJECTIVE    15 min []Eval                  []Re-Eval       25 min Therapeutic Exercise:  [] See flow sheet :   Rationale: increase ROM, increase strength, improve coordination, and improve balance to improve the patients ability to ease with adl's    With   [] TE   [] TA   [] neuro   [] other: Patient Education: [x] Review HEP    [] Progressed/Changed HEP based on:   [] positioning   [] body mechanics   [] transfers   [] heat/ice application    [] other:      Other Objective/Functional Measures: see eval     Pain Level (0-10 scale) post treatment: 8/10    ASSESSMENT/Changes in Function: see poc    Patient will continue to benefit from skilled PT services to modify and progress therapeutic interventions, address functional mobility deficits, address ROM deficits, address strength deficits, analyze and address soft tissue restrictions, analyze and cue movement patterns, analyze and modify body mechanics/ergonomics, assess and modify postural abnormalities, address imbalance/dizziness, and instruct in home and community integration to attain remaining goals.      [x] See Plan of Care  []  See progress note/recertification  []  See Discharge Summary         Progress towards goals / Updated goals:  See poc    PLAN  [x]  Upgrade activities as tolerated     [x]  Continue plan of care  []  Update interventions per flow sheet       []  Discharge due to:_  []  Other:_      Oscar Edmonds, PT 10/10/2022  9:49 AM    Future Appointments   Date Time Provider Talon Hull   10/26/2022 11:30 AM Luciano Blount DO GMA BS AMB   11/9/2022 10:30 AM [facility] M[address] 1 RMCT [facility]

## 2022-10-10 NOTE — THERAPY EVALUATION
[facility] [employer] [facility]  [address] [place] [address]  [phone] fax    Plan of Care/ Statement of Necessity for Physical Therapy Services    Patient name: Thuy Snider. Start of Care: 10/10/2022   Referral source: Humberto Chung  : 1955    Medical Diagnosis: Lower extremity weakness [R29.898]  Balance problem [R26.89]  Other low back pain [M54.59]  Payor: [facility] MEDICARE / Plan: [employer] MCR / Product Type: Managed Care Medicare /  Onset Date:approx 1 yr    Treatment Diagnosis: LE Weakness/ Gait abnormality/ LS and CS Pain   Prior Hospitalization: see medical history Provider#: [id number]   Medications: Verified on Patient summary List    Comorbidities: PTSD, Memory Changes, Tobacco Use, LS Fusion /CS Fusion . Ambulatory Dysfunction, Cognitive Impairments. Prior Level of Function: Retired . Pt lives with Wife and is somewhat dependent on her for ADL's in the mornings. Pt ambulates with a SPC. The Plan of Care and following information is based on the information from the initial evaluation. Assessment/ key information: Pt is a 75 yr old male with cc of   \" pain and stiffness everywhere\" . Pt reports prior LS and CS surgeries,  and  respectively. He denied having any PT post OP. Pt is accompanied by his wife today. Pt ambulates with a SPC and is observed to have unstable gait mechanics. His wife reports she changes his shoes frequently b/c he wears it down from dragging his feet. Pt admits instability and decreased ROM especially in the mornings. He denies any falls. CS Mobility is poor/ LE is hypomobile with extremity tightness. Left / right hand  strength is 25# and 42#. Pt has TTP to UT, bilaterally, bilateral hips and L3-L5 paraspinals. LE is strength grossly 4+/5. Romberg EOx 30 sec WBOS, EC x 20 sec.   YC=734/66.     Pt's wife reported she found Mr Roberth Schmidt passed out on the pavement at home while trying to retrieve something from inside his car approximately 1 month ago. Pt did not seek medical attention. Pt advised to report this event to MD and also to take BP 2x/day and document for MD.    Pt will benefit from skilled PT to improve Safety, Gait mechanics, LE flexibility and strength, restore CS and LS Function to improve  QOL. Evaluation Complexity History MEDIUM  Complexity : 1-2 comorbidities / personal factors will impact the outcome/ POC ; Examination MEDIUM Complexity : 3 Standardized tests and measures addressing body structure, function, activity limitation and / or participation in recreation  ;Presentation MEDIUM Complexity : Evolving with changing characteristics  ; Clinical Decision Making MEDIUM Complexity : FOTO score of 26-74  Overall Complexity Rating: MEDIUM  Problem List: pain affecting function, decrease ROM, decrease strength, impaired gait/ balance, decrease ADL/ functional abilitiies, decrease activity tolerance, decrease flexibility/ joint mobility, and decrease transfer abilities   Treatment Plan may include any combination of the following: Therapeutic exercise, Neuromuscular reeducation, Manual therapy, Therapeutic activity, Electric stim unattended , and Gait training  Patient / Family readiness to learn indicated by: asking questions, trying to perform skills, and interest  Persons(s) to be included in education: patient (P) and family support person (FSP);list wife  Barriers to Learning/Limitations: None  Patient Goal (s): Relief and understanding of ways to cope with general condition of my body.   Patient Self Reported Health Status: fair  Rehabilitation Potential: fair    Short Term Goals: To be accomplished in 1 weeks:   Goal :1. Pt will establish a HEP to improve overall function. Goal : will be given t follow up visit. Long Term Goals: To be accomplished in 4 weeks:   Goal: Pt will increase FOTO score by    pts to improve function.    Status at evaluation/last progress note:     2. Goal: Pt will increase CS Rotation/ Left to >40 deg to ease with driving abilities. Status at evaluation/last progress note:Left CS Rotation=20 deg     3. Goal: Pt will perform Romberg EC x 30 sec on a compliant surface w/o LOB and SBA. Status at evaluation/last progress note:     4. Goal: Pt will report pain <6/10 to ease with ADL's  Status at evaluation/last progress note:Pain =8/10     5. Goal: Pt will perform 10x sit to  30 sec  Status at evaluation/last progress note: 8 sit to  30 sec. Frequency / Duration: Patient to be seen 2-3 times per week for 4 weeks. Patient/ Caregiver education and instruction: Diagnosis, prognosis, self care and exercises   [x]  Plan of care has been reviewed with PTA    Certification Period: 10/10/22-11/08/22  Aurora Severe, PT 10/10/2022 1:37 PM    ________________________________________________________________________    I certify that the above Therapy Services are being furnished while the patient is under my care. I agree with the treatment plan and certify that this therapy is necessary.     [phone] Signature:____________Date:_________TIME:________     Vitaliy Martinez DO  ** Signature, Date and Time must be completed for valid certification **    Please sign and return to [facility] - [facility]er  [address] [place] [address]  [phone] fax

## 2022-10-12 ENCOUNTER — HOSPITAL ENCOUNTER (OUTPATIENT)
Dept: PHYSICAL THERAPY | Age: 67
Discharge: HOME | End: 2022-10-12
Attending: STUDENT IN AN ORGANIZED HEALTH CARE EDUCATION/TRAINING PROGRAM
Payer: MEDICARE

## 2022-10-12 PROCEDURE — 97112 NEUROMUSCULAR REEDUCATION: CPT

## 2022-10-12 PROCEDURE — 97110 THERAPEUTIC EXERCISES: CPT

## 2022-10-12 NOTE — PROGRESS NOTES
PT DAILY TREATMENT NOTE     Patient Name: Maria Esther Clements. Date:10/12/2022  : 1955  [x]  Patient  Verified  Payor: [facility] MEDICARE / Plan: [facility] COMMUNITY PLAN MCR / Product Type: Managed Care Medicare /    In time:900  Out time:938  Total Treatment Time (min): 38  Visit #: 2 of     Medicare/BCBS Only   Total Timed Codes (min):  38 1:1 Treatment Time:  38       Treatment Area: Lower extremity weakness [R29.898]  Balance problem [R26.89]  Other low back pain [M54.59]    SUBJECTIVE  Pain Level (0-10 scale): 0/10  Any medication changes, allergies to medications, adverse drug reactions, diagnosis change, or new procedure performed?: [x] No    [] Yes (see summary sheet for update)  Subjective functional status/changes:   [] No changes reported  Pt stated that he is blessed and has no pain today    OBJECTIVE    28 min Therapeutic Exercise:  [x] See flow sheet :   Rationale: increase ROM and increase strength to improve the patients ability to increase ease with ADLs    10 min Neuromuscular Re-education:  [x]  See flow sheet :   Rationale: increase strength, improve coordination, improve balance, and increase proprioception  to improve the patients ability to increase ease with functional tasks    With   [x] TE   [] TA   [] neuro   [] other: Patient Education: [x] Review HEP    [] Progressed/Changed HEP based on:   [] positioning   [] body mechanics   [] transfers   [] heat/ice application    [] other:      Other Objective/Functional Measures:   /87 pt reported that he has not taken hs BP medication yet, educated pt on importance of taking the medication at the same time every day  No difficulty with exercises  No complaint of pain during session   Minimal cueing needed for form with exercises  No LOB with static balance     Pain Level (0-10 scale) post treatment: 0/10    ASSESSMENT/Changes in Function:   Initiated therex today per flow sheet.  Pt put forth good effort with all exercises    Patient will continue to benefit from skilled PT services to modify and progress therapeutic interventions, address functional mobility deficits, address ROM deficits, address strength deficits, analyze and address soft tissue restrictions, analyze and cue movement patterns, analyze and modify body mechanics/ergonomics, assess and modify postural abnormalities, address imbalance/dizziness, and instruct in home and community integration to attain remaining goals. [x]  See Plan of Care  []  See progress note/recertification  []  See Discharge Summary         Progress towards goals / Updated goals:  Short Term Goals: To be accomplished in 1 weeks:              Goal :1. Pt will establish a HEP to improve overall function. Goal : will be given t follow up visit. Long Term Goals: To be accomplished in 4 weeks:              Goal: Pt will increase FOTO score by    pts to improve function. Status at evaluation/last progress note:     2. Goal: Pt will increase CS Rotation/ Left to >40 deg to ease with driving abilities. Status at evaluation/last progress note:Left CS Rotation=20 deg     3. Goal: Pt will perform Romberg EC x 30 sec on a compliant surface w/o LOB and SBA. Status at evaluation/last progress note:     4. Goal: Pt will report pain <6/10 to ease with ADL's  Status at evaluation/last progress note:Pain =8/10     5. Goal: Pt will perform 10x sit to  30 sec  Status at evaluation/last progress note: 8 sit to  30 sec.      PLAN  []  Upgrade activities as tolerated     [x]  Continue plan of care  []  Update interventions per flow sheet       []  Discharge due to:_  []  Other:_      Amaya Perez PTA 10/12/2022  6:31 AM    Future Appointments   Date Time Provider Talon Hull   10/12/2022  9:00 AM Ai Orozco PTA [facility]CP[facility]   10/17/2022  9:45 AM Duncan Law PT [id number] [facility]   10/19/2022  9:00 AM Ai Orzoco PTA [id number] [facility]   10/24/2022 11:15 AM Justin Dodd, PT MMCPTPB [address]   10/26/2022  9:00 AM Ran Suleman, PTA MMCPTP[facility] [address]   10/26/2022 11:30 AM Zach Shine DO GMA BS AMB   10/31/2022  9:00 AM Ran Suleman, PTA [facility]CP[facility] [address]   11/2/2022  9:00 AM Ran Suleman, PTA [facility]CPTP[facility] [address]   11/7/2022  9:00 AM Ran Suleman, PTA [facility]CPTP[facility] [address]   11/9/2022  9:45 AM Justin Dodd, PT [facility]CPTPB [address]   11/9/2022 10:30 AM [facility] MOB CT 1 RMCT [facility]   11/14/2022 10:30 AM Justin Dodd, PT IQOMZRX [address]   11/16/2022  9:00 AM Ran Suleman, PTA MMCPTPB [address]   11/21/2022  9:00 AM Ran Suleman, PTA MMCPTPB [address]

## 2022-10-19 ENCOUNTER — HOSPITAL ENCOUNTER (OUTPATIENT)
Dept: PHYSICAL THERAPY | Age: 67
Discharge: HOME | End: 2022-10-19
Attending: STUDENT IN AN ORGANIZED HEALTH CARE EDUCATION/TRAINING PROGRAM
Payer: MEDICARE

## 2022-10-19 PROCEDURE — 97110 THERAPEUTIC EXERCISES: CPT

## 2022-10-19 PROCEDURE — 97112 NEUROMUSCULAR REEDUCATION: CPT

## 2022-10-19 NOTE — PROGRESS NOTES
PT DAILY TREATMENT NOTE     Patient Name: Anette Delgado. Date:10/19/2022  : 1955  [x]  Patient  Verified  Payor: [facility] MEDICARE / Plan: [facility] COMMUNITY PLAN MCR / Product Type: Managed Care Medicare /    In time:900  Out time:938  Total Treatment Time (min): 38  Visit #: 3 of     Medicare/BCBS Only   Total Timed Codes (min):  38 1:1 Treatment Time:  38       Treatment Area: Lower extremity weakness [R29.898]  Balance problem [R26.89]  Other low back pain [M54.59]    SUBJECTIVE  Pain Level (0-10 scale): 6-7/10  Any medication changes, allergies to medications, adverse drug reactions, diagnosis change, or new procedure performed?: [x] No    [] Yes (see summary sheet for update)  Subjective functional status/changes:   [] No changes reported  Pt stated that he is having some pain in the right knee today    OBJECTIVE    28 min Therapeutic Exercise:  [x] See flow sheet :   Rationale: increase ROM and increase strength to improve the patients ability to increase ease with ADLs     10 min Neuromuscular Re-education:  [x]  See flow sheet :   Rationale: increase strength, improve coordination, improve balance, and increase proprioception  to improve the patients ability to increase ease with functional tasks    With   [x] TE   [] TA   [] neuro   [] other: Patient Education: [x] Review HEP    [] Progressed/Changed HEP based on:   [] positioning   [] body mechanics   [] transfers   [] heat/ice application    [] other:      Other Objective/Functional Measures:   No LOB with static balance  No complain of increased knee pain during session   No difficulty with exercises     Pain Level (0-10 scale) post treatment: 0/10    ASSESSMENT/Changes in Function:   Pt is making slow progress toward goals. Pt cont with inconsistent reported pain levels. Cont with decreased strength in B LE, but is slowly improving. Static balance is improving. Cont to have some difficulty with sit to stand transfers.  Cervical AROM is improving    Patient will continue to benefit from skilled PT services to modify and progress therapeutic interventions, address functional mobility deficits, address ROM deficits, address strength deficits, analyze and cue movement patterns, analyze and modify body mechanics/ergonomics, assess and modify postural abnormalities, address imbalance/dizziness, and instruct in home and community integration to attain remaining goals. [x]  See Plan of Care  []  See progress note/recertification  []  See Discharge Summary         Progress towards goals / Updated goals:  Short Term Goals: To be accomplished in 1 weeks:              Goal :1. Pt will establish a HEP to improve overall function. Goal : will be given t follow up visit. Long Term Goals: To be accomplished in 4 weeks:              Goal: Pt will increase FOTO score by    pts to improve function. Status at evaluation/last progress note:     2. Goal: Pt will increase CS Rotation/ Left to >40 deg to ease with driving abilities. Status at evaluation/last progress note:Left CS Rotation=20 deg     3. Goal: Pt will perform Romberg EC x 30 sec on a compliant surface w/o LOB and SBA. Status at evaluation/last progress note:     4. Goal: Pt will report pain <6/10 to ease with ADL's  Status at evaluation/last progress note:Pain =8/10     5. Goal: Pt will perform 10x sit to  30 sec  Status at evaluation/last progress note: 8 sit to  30 sec.      PLAN  []  Upgrade activities as tolerated     [x]  Continue plan of care  []  Update interventions per flow sheet       []  Discharge due to:_  []  Other:_      Rochelle Emmanuel PTA 10/19/2022  6:41 AM    Future Appointments   Date Time Provider Talon Hull   10/19/2022  9:00 AM James Corona PTA [facility]   10/24/2022 11:15 AM Rome Recio, PT [id number] [facility]   10/26/2022  9:00 AM James Corona PTA [facility]   10/26/2022 11:30 AM Monae Plummer, DO GMA BS AMB   10/31/2022  9:00 AM Alina Pate, PTA [facility]   11/2/2022  9:00 AM Alina Pate, PTA [facility]   11/7/2022  9:00 AM Alina Pate, PTA [facility]   11/9/2022  9:45 AM Lila Franco, PT [facility]   11/9/2022 10:30 AM [address] MOB [address] 1 RMCT [address]   11/14/2022 10:30 AM Lila Franco, PT [facility]   11/16/2022  9:00 AM Alina Pate, PTA [facility]   11/21/2022  9:00 AM Alina Pate, PTA [facility]

## 2022-10-24 ENCOUNTER — HOSPITAL ENCOUNTER (OUTPATIENT)
Dept: PHYSICAL THERAPY | Age: 67
Discharge: HOME | End: 2022-10-24
Attending: STUDENT IN AN ORGANIZED HEALTH CARE EDUCATION/TRAINING PROGRAM
Payer: MEDICARE

## 2022-10-24 PROCEDURE — 97112 NEUROMUSCULAR REEDUCATION: CPT

## 2022-10-24 PROCEDURE — 97110 THERAPEUTIC EXERCISES: CPT

## 2022-10-24 NOTE — PROGRESS NOTES
PT DAILY TREATMENT NOTE     Patient Name: Anette Delgado. Date:10/24/2022  : 1955  [x]  Patient  Verified  Payor: [facility] MEDICARE / Plan: [facility] COMMUNITY PLAN MCR / Product Type: Managed Care Medicare /    In time:1115 Out time:1200  Total Treatment Time (min): 45  Visit #: 4 of 12    Medicare/BCBS Only   Total Timed Codes (min):  45 1:1 Treatment Time:  45       Treatment Area: Lower extremity weakness [R29.898]  Balance problem [R26.89]  Other low back pain [M54.59]    SUBJECTIVE  Pain Level (0-10 scale): 0/10  Any medication changes, allergies to medications, adverse drug reactions, diagnosis change, or new procedure performed?: [x] No    [] Yes (see summary sheet for update)  Subjective functional status/changes:   [] No changes reported  Reports no pain today, \"yet\"    OBJECTIVE    30 min Therapeutic Exercise:  [] See flow sheet :   Rationale: increase ROM, increase strength, improve coordination, and improve balance to improve the patients ability to ease with adl's. 15 min Neuromuscular Re-education:  []  See flow sheet :   Rationale: increase ROM, increase strength, improve coordination, and improve balance  to improve the patients ability to ease with adl's          With   [] TE   [] TA   [] neuro   [] other: Patient Education: [x] Review HEP    [] Progressed/Changed HEP based on:   [] positioning   [] body mechanics   [] transfers   [] heat/ice application    [] other:      Other Objective/Functional Measures: IM=446/71   Good tolerance to ex's with min cues for posture. CS rotation right= 75 deg/ Left rotation= 65 deg. Pain Level (0-10 scale) post treatment: 0/10    ASSESSMENT/Changes in Function: Progressing well with weights added to program . Pt pleased with ex's. He has no pain today. CS rotation has improved and pt is progressing well toward LTG.      Patient will continue to benefit from skilled PT services to modify and progress therapeutic interventions, address functional mobility deficits, address ROM deficits, address strength deficits, analyze and address soft tissue restrictions, analyze and cue movement patterns, analyze and modify body mechanics/ergonomics, assess and modify postural abnormalities, and address imbalance/dizziness to attain remaining goals. [x]  See Plan of Care  []  See progress note/recertification  []  See Discharge Summary         Progress towards goals / Updated goals:    Short Term Goals: To be accomplished in 1 weeks:              Goal :1. Pt will establish a HEP to improve overall function. Goal : will be given t follow up visit. Long Term Goals: To be accomplished in 4 weeks:              Goal: Pt will increase FOTO score by    pts to improve function. Status at evaluation/last progress note:     2. Goal: Pt will increase CS Rotation/ Left to >40 deg to ease with driving abilities. Status at evaluation/last progress note:Left CS Rotation=20 deg  Current: CS rotation right= 75 deg/ Left rotation= 65 deg. 3.   Goal: Pt will perform Romberg EC x 30 sec on a compliant surface w/o LOB and SBA. Status at evaluation/last progress note:  Current: Foam EC x 30 sec/ no LOB/ supervision     4. Goal: Pt will report pain <6/10 to ease with ADL's  Status at evaluation/last progress note:Pain =8/10     5. Goal: Pt will perform 10x sit to  30 sec  Status at evaluation/last progress note: 8 sit to  30 sec.       PLAN  [x]  Upgrade activities as tolerated     [x]  Continue plan of care  []  Update interventions per flow sheet       []  Discharge due to:_  []  Other:_      Tamara Henson, PT 10/24/2022  11:20 AM    Future Appointments   Date Time Provider Talon Hull   10/26/2022  9:00 AM Denice Jaramillo PTA [facility]   10/26/2022 11:30 AM Jadon Garrido DO GMA BS AMB   10/31/2022  9:00 AM Denice Jaramillo PTA [facility]   11/2/2022  9:00 AM Denice Jaramillo PTA [facility]   11/7/2022  9:00 AM Kailee Velma Sequeira [facility]   11/9/2022  9:45 AM Merry Gao, PT [facility]CPTP[facility]   11/9/2022 10:30 AM [address] M[address] 1 RMCT [address]   11/14/2022 10:30 AM Merry Gao, PT [facility]CP[facility]   11/16/2022  9:00 AM Ai Orozco, PTA [facility]   11/21/2022  9:00 AM Ai Orozco, PTA [facility]CP[facility]

## 2022-10-26 ENCOUNTER — APPOINTMENT (OUTPATIENT)
Dept: PHYSICAL THERAPY | Age: 67
End: 2022-10-26
Attending: STUDENT IN AN ORGANIZED HEALTH CARE EDUCATION/TRAINING PROGRAM
Payer: MEDICARE

## 2022-10-31 ENCOUNTER — HOSPITAL ENCOUNTER (OUTPATIENT)
Dept: PHYSICAL THERAPY | Age: 67
Discharge: HOME | End: 2022-10-31
Attending: STUDENT IN AN ORGANIZED HEALTH CARE EDUCATION/TRAINING PROGRAM
Payer: MEDICARE

## 2022-10-31 PROCEDURE — 97112 NEUROMUSCULAR REEDUCATION: CPT

## 2022-10-31 PROCEDURE — 97110 THERAPEUTIC EXERCISES: CPT

## 2022-10-31 NOTE — PROGRESS NOTES
PT DAILY TREATMENT NOTE     Patient Name: Rashaun Phillips. Date:10/31/2022  : 1955  [x]  Patient  Verified  Payor: [facility] MEDICARE / Plan: [facility] COMMUNITY PLAN MCR / Product Type: Managed Care Medicare /    In time:900  Out time:938  Total Treatment Time (min): 38  Visit #: 5 of 12    Medicare/BCBS Only   Total Timed Codes (min):  38 1:1 Treatment Time:  38       Treatment Area: Lower extremity weakness [R29.898]  Balance problem [R26.89]  Other low back pain [M54.59]    SUBJECTIVE  Pain Level (0-10 scale): 0/10  Any medication changes, allergies to medications, adverse drug reactions, diagnosis change, or new procedure performed?: [x] No    [] Yes (see summary sheet for update)  Subjective functional status/changes:   [] No changes reported  Pt stated that he is doing well and has no pain today    OBJECTIVE    30 min Therapeutic Exercise:  [x] See flow sheet :   Rationale: increase ROM, increase strength, improve coordination, and improve balance to improve the patients ability to ease with adl's. 8 min Neuromuscular Re-education:  [x]  See flow sheet :   Rationale: increase ROM, increase strength, improve coordination, and improve balance  to improve the patients ability to ease with adl's          With   [x] TE   [] TA   [] neuro   [] other: Patient Education: [x] Review HEP    [] Progressed/Changed HEP based on:   [] positioning   [] body mechanics   [] transfers   [] heat/ice application    [] other:      Other Objective/Functional Measures:   Increased to 20 reps with HR/TR  No LOB with static balance on floor and foam  No complaint of increased pain during session     Pain Level (0-10 scale) post treatment: 0/10    ASSESSMENT/Changes in Function:   Pt is making good progress toward goals. Pt cont with decreased standing and walking tolerance. Has reported no pain for the past 2 visits. Cervical AROM cont to improve. Cont to have some difficulty with static balance, but is improving. Sit to stands are improving. Patient will continue to benefit from skilled PT services to modify and progress therapeutic interventions, address functional mobility deficits, address ROM deficits, address strength deficits, analyze and cue movement patterns, analyze and modify body mechanics/ergonomics, assess and modify postural abnormalities, address imbalance/dizziness, and instruct in home and community integration to attain remaining goals. [x]  See Plan of Care  []  See progress note/recertification  []  See Discharge Summary         Progress towards goals / Updated goals:  Short Term Goals: To be accomplished in 1 weeks:              Goal :1. Pt will establish a HEP to improve overall function. Goal : will be given t follow up visit. Long Term Goals: To be accomplished in 4 weeks:              Goal: Pt will increase FOTO score by    pts to improve function. Status at evaluation/last progress note:     2. Goal: Pt will increase CS Rotation/ Left to >40 deg to ease with driving abilities. Status at evaluation/last progress note:Left CS Rotation=20 deg  Current: CS rotation right= 75 deg/ Left rotation= 65 deg. 3.   Goal: Pt will perform Romberg EC x 30 sec on a compliant surface w/o LOB and SBA. Status at evaluation/last progress note:  Current: Foam EC x 30 sec/ no LOB/ supervision     4. Goal: Pt will report pain <6/10 to ease with ADL's  Status at evaluation/last progress note:Pain =8/10  Current: progressing. Pt has reported no pain for the past 2 visits. 10/31/22  5. Goal: Pt will perform 10x sit to  30 sec  Status at evaluation/last progress note: 8 sit to  30 sec.      PLAN  []  Upgrade activities as tolerated     [x]  Continue plan of care  []  Update interventions per flow sheet       []  Discharge due to:_  []  Other:_      Gina Commander, PTA 10/31/2022  6:35 AM    Future Appointments   Date Time Provider Talon Hull   10/31/2022  9:00 AM Molly Chaudhary, PTA [facility]   11/2/2022  9:00 AM Molly Chaudhary, PTA [facility]   11/7/2022  9:00 AM Molly Chaudhary, PTA [facility]   11/9/2022  9:45 AM Yamilka Flores, PT [facility]   11/9/2022 10:30 AM [facility] MOB CT 1 RMCT [facility]   11/14/2022 10:30 AM Yamilka Flores, PT [facility]   11/16/2022  9:00 AM Molly Chaudhary, PTA [facility]   11/21/2022  9:00 AM Molly Chaudhary, PTA [facility]   12/5/2022  4:00 PM Carline Lin DO [facility] AMB

## 2022-11-02 ENCOUNTER — HOSPITAL ENCOUNTER (OUTPATIENT)
Dept: PHYSICAL THERAPY | Age: 67
Discharge: HOME | End: 2022-11-02
Attending: STUDENT IN AN ORGANIZED HEALTH CARE EDUCATION/TRAINING PROGRAM
Payer: MEDICARE

## 2022-11-02 PROCEDURE — 97112 NEUROMUSCULAR REEDUCATION: CPT

## 2022-11-02 PROCEDURE — 97110 THERAPEUTIC EXERCISES: CPT

## 2022-11-02 NOTE — PROGRESS NOTES
PT DAILY TREATMENT NOTE     Patient Name: Loly Maloney. Date:2022  : 1955  [x]  Patient  Verified  Payor: [facility] MEDICARE / Plan: [facility] COMMUNITY PLAN MCR / Product Type: Managed Care Medicare /    In time:900  Out time:938  Total Treatment Time (min): 38  Visit #: 6 of 12    Medicare/BCBS Only   Total Timed Codes (min):  38 1:1 Treatment Time:  38       Treatment Area: Lower extremity weakness [R29.898]  Balance problem [R26.89]  Other low back pain [M54.59]    SUBJECTIVE  Pain Level (0-10 scale): 7/10  Any medication changes, allergies to medications, adverse drug reactions, diagnosis change, or new procedure performed?: [x] No    [] Yes (see summary sheet for update)  Subjective functional status/changes:   [] No changes reported  Pt stated that he always has pain    OBJECTIVE    30 min Therapeutic Exercise:  [x] See flow sheet :   Rationale: increase ROM, increase strength, improve coordination, and improve balance to improve the patients ability to ease with adl's. 8 min Neuromuscular Re-education:  [x]  See flow sheet :   Rationale: increase ROM, increase strength, improve coordination, and improve balance  to improve the patients ability to ease with adl's          With   [x] TE   [] TA   [] neuro   [] other: Patient Education: [x] Review HEP    [] Progressed/Changed HEP based on:   [] positioning   [] body mechanics   [] transfers   [] heat/ice application    [] other:      Other Objective/Functional Measures:   Pt appeared to be falling asleep during session  No complaint of increased pain during session   No LOB with static balance     Pain Level (0-10 scale) post treatment: 5/10    ASSESSMENT/Changes in Function:   Pt is making slow progress toward goals. Pt cont with decreased cervical AROM, but is slowly improving. Static balance cont to improve. Pt cont to report having moderate pain every day. Sit to stands are improving.      Patient will continue to benefit from skilled PT services to modify and progress therapeutic interventions, address functional mobility deficits, address ROM deficits, address strength deficits, analyze and cue movement patterns, analyze and modify body mechanics/ergonomics, assess and modify postural abnormalities, address imbalance/dizziness, and instruct in home and community integration to attain remaining goals. [x]  See Plan of Care  []  See progress note/recertification  []  See Discharge Summary         Progress towards goals / Updated goals:  Short Term Goals: To be accomplished in 1 weeks:              Goal :1. Pt will establish a HEP to improve overall function. Goal : will be given t follow up visit. Long Term Goals: To be accomplished in 4 weeks:              Goal: Pt will increase FOTO score by    pts to improve function. Status at evaluation/last progress note:     2. Goal: Pt will increase CS Rotation/ Left to >40 deg to ease with driving abilities. Status at evaluation/last progress note:Left CS Rotation=20 deg  Current: CS rotation right= 75 deg/ Left rotation= 65 deg. 3.   Goal: Pt will perform Romberg EC x 30 sec on a compliant surface w/o LOB and SBA. Status at evaluation/last progress note:  Current: Foam EC x 30 sec/ no LOB/ supervision     4. Goal: Pt will report pain <6/10 to ease with ADL's  Status at evaluation/last progress note:Pain =8/10  Current: progressing. Pt has reported no pain for the past 2 visits. 10/31/22  5. Goal: Pt will perform 10x sit to  30 sec  Status at evaluation/last progress note: 8 sit to  30 sec.      PLAN  []  Upgrade activities as tolerated     [x]  Continue plan of care  []  Update interventions per flow sheet       []  Discharge due to:_  []  Other:_      Zack Khan PTA 11/2/2022  7:41 AM    Future Appointments   Date Time Provider Talon Briceñoisti   11/2/2022  9:00 AM Roya Webber PTA [facility]   11/7/2022  9:00 AM Roya Webber PTA [facility]   11/9/2022  9:45 AM Merry Sima, PT [facility]   11/9/2022 10:30 AM [address] MOB [address] 1 RMCT [address]   11/14/2022 10:30 AM Merry Gao, PT [facility]   11/16/2022  9:00 AM Ai Orozco, PTA [facility]   11/21/2022  9:00 AM Ai Orozco, PTA [facility]   12/5/2022  4:00 PM Carline Lin DO [facility] AMB

## 2022-11-07 ENCOUNTER — HOSPITAL ENCOUNTER (OUTPATIENT)
Dept: PHYSICAL THERAPY | Age: 67
End: 2022-11-07
Attending: STUDENT IN AN ORGANIZED HEALTH CARE EDUCATION/TRAINING PROGRAM
Payer: MEDICARE

## 2022-11-09 ENCOUNTER — HOSPITAL ENCOUNTER (OUTPATIENT)
Dept: CT IMAGING | Age: 67
Discharge: HOME | End: 2022-11-09
Attending: STUDENT IN AN ORGANIZED HEALTH CARE EDUCATION/TRAINING PROGRAM
Payer: MEDICARE

## 2022-11-09 ENCOUNTER — HOSPITAL ENCOUNTER (OUTPATIENT)
Dept: PHYSICAL THERAPY | Age: 67
End: 2022-11-09
Attending: STUDENT IN AN ORGANIZED HEALTH CARE EDUCATION/TRAINING PROGRAM
Payer: MEDICARE

## 2022-11-09 DIAGNOSIS — Z87.891 PERSONAL HISTORY OF TOBACCO USE, PRESENTING HAZARDS TO HEALTH: ICD-10-CM

## 2022-11-09 PROCEDURE — 71271 CT THORAX LUNG CANCER SCR C-: CPT

## 2022-11-14 ENCOUNTER — HOSPITAL ENCOUNTER (OUTPATIENT)
Dept: PHYSICAL THERAPY | Age: 67
Discharge: HOME | End: 2022-11-14
Attending: STUDENT IN AN ORGANIZED HEALTH CARE EDUCATION/TRAINING PROGRAM
Payer: MEDICARE

## 2022-11-14 PROCEDURE — 97112 NEUROMUSCULAR REEDUCATION: CPT

## 2022-11-14 PROCEDURE — 97110 THERAPEUTIC EXERCISES: CPT

## 2022-11-14 NOTE — PROGRESS NOTES
[facility]  [address] [place] [address]  [phone] fax    Continued Plan of Care/ Re-certification for Physical Therapy Services    Patient name: Evelin Cao. Start of Care: 10/10/2022   Referral source: Ibeth Bain DO : 1955               Medical Diagnosis: Lower extremity weakness [R29.898]  Balance problem [R26.89]  Other low back pain [M54.59]  Payor: [facility] MEDICARE / Plan: [facility] MCR / Product Type: Managed Care Medicare /  Onset Date:approx 1 yr               Treatment Diagnosis: LE Weakness/ Gait abnormality/ LS and CS Pain   Prior Hospitalization: see medical history Provider#: [id number]   Medications: Verified on Patient summary List    Comorbidities: PTSD, Memory Changes, Tobacco Use, LS Fusion /CS Fusion . Ambulatory Dysfunction, Cognitive Impairments. Prior Level of Function: Retired . Pt lives with Wife and is somewhat dependent on her for ADL's in the mornings. Pt ambulates with a SPC. Visits from Start of Care: 8    Missed Visits: 1    The Plan of Care and following information is based on the patient's current status:              Goal: Pt will increase FOTO score by  9  pts to improve function. Status at evaluation/last progress note: FOTO=59  Current: FOTO=58     2. Goal: Pt will increase CS Rotation/ Left to >40 deg to ease with driving abilities. Status at evaluation/last progress note:Left CS Rotation=20 deg  Current: CS rotation right= 75 deg/ Left rotation= 65 deg. 3.   Goal: Pt will perform Romberg EC x 30 sec on a compliant surface w/o LOB and SBA. Status at evaluation/last progress note:  Current: Foam EC x 30 sec/ no LOB/ supervision     4. Goal: Pt will report pain <6/10 to ease with ADL's  Status at evaluation/last progress note:Pain =8/10  Current: progressing. Pt has reported no pain for the past 2 visits. 10/31/22  5.    Goal: Pt will perform 10x sit to  30 sec  Status at evaluation/last progress note: 8 sit to  30 sec. Current: >10 sit to  30 sec. Key functional changes: Improved static balance EO. CS ROM WFL. Pain fluctuated daily. Ambulate w/o AD. Problems/ barriers to goal attainment: None. Problem List: pain affecting function, decrease ROM, decrease strength, impaired gait/ balance, decrease ADL/ functional abilitiies, decrease activity tolerance, and decrease flexibility/ joint mobility    Treatment Plan: Therapeutic exercise, Neuromuscular reeducation, Manual therapy, Therapeutic activity, Self care/home management, Electric stim unattended , and Gait training     Patient Goal (s) has been updated and includes: Dynamic Balance. Strength. Goals for this certification period to be accomplished in 4 weeks:     Goal: Pt will increase FOTO score by  9  pts to improve function. Status at evaluation/last progress note: FOTO=58    2. Goal: Pt will increase DGI>20/24 to decrease fall risk. Status at evaluation/last progress note: DGI=19/24    3. Goal: Pt will navigate an obstacle course w/o LOB to be able to safely Navigate his surroundings. Status at evaluation/last progress note:Pt has difficulty stepping over obstacles placed on floor. 4. Pt will perform SLS >3 sec bilaterally to improve with ease of Ambulation/Balance/ADL's. Status at evaluation/last progress note:   SLS=left 2 sec/right=3sec    Frequency / Duration: Patient to be seen 2-3 times per week for 4 weeks:    Assessment / Recommendations: Pt is making slow/steady progress in PT. CS ROM has improved, pain is same intensity most days. Pt had Low Blood Pressure today for unknown reasons. Denies any dizziness, HA's, LOB. Pt is motivated and active participant in PT program he has made progress in CS ROM and ambulates w/o an AD.  DGI=19/24.     :Patient will continue to benefit from skilled PT services to modify and progress therapeutic interventions, address functional mobility deficits, address ROM deficits, address strength deficits, analyze and address soft tissue restrictions, analyze and cue movement patterns, analyze and modify body mechanics/ergonomics, assess and modify postural abnormalities, address imbalance/dizziness, and instruct in home and community integration to attain remaining goals. Certification Period: 11/14/22-12/13/22    Jacinta Flores, PT 11/14/2022 10:41 AM    ________________________________________________________________________  I certify that the above Therapy Services are being furnished while the patient is under my care. I agree with the treatment plan and certify that this therapy is necessary. [] I have read the above and request that my patient continue as recommended.   [] I have read the above report and request that my patient continue therapy with the following changes/special instructions: _______________________________________  [] I have read the above report and request that my patient be discharged from therapy    Physician's Signature:____________Date:_________TIME:________     Ibeth Bain DO  ** Signature, Date and Time must be completed for valid certification **    Please sign and return to [facility]  [address] [place] [address]  [phone] fax

## 2022-11-14 NOTE — PROGRESS NOTES
PT DAILY TREATMENT NOTE     Patient Name: Eugene Langford. Date:2022  : 1955  [x]  Patient  Verified  Payor: [facility] MEDICARE / Plan: [facility] COMMUNITY PLAN MCR / Product Type: Managed Care Medicare /    In NU  Out time:1113  Total Treatment Time (min): 41  Visit #: 7 of     Medicare/BCBS Only   Total Timed Codes (min):  41 1:1 Treatment Time:  41       Treatment Area: Lower extremity weakness [R29.898]  Balance problem [R26.89]  Other low back pain [M54.59]    SUBJECTIVE  Pain Level (0-10 scale): -8/10  Any medication changes, allergies to medications, adverse drug reactions, diagnosis change, or new procedure performed?: [x] No    [] Yes (see summary sheet for update)  Subjective functional status/changes:   [] No changes reported  Reports he has been achy and pain is across his upper back today. Some days are good and some are not as good. OBJECTIVE      11 min Therapeutic Exercise:  [] See flow sheet :   Rationale: increase ROM, increase strength, improve coordination, and improve balance to improve the patients ability to ease with adl's     30 min Neuromuscular Re-education:  []  See flow sheet :   Rationale: increase ROM, increase strength, improve coordination, and improve balance  to improve the patients ability to ease with adl's    With   [] TE   [] TA   [] neuro   [] other: Patient Education: [x] Review HEP    [] Progressed/Changed HEP based on:   [] positioning   [] body mechanics   [] transfers   [] heat/ice application    [] other:      Other Objective/Functional Measures: VQ=641/60  SLS=left 2 sec/right=3sec DGI=   FOTO=58  Pain Level (0-10 scale) post treatment: 6/10    ASSESSMENT/Changes in Function: See PN. Pt not sure what was causing BP to be low today. Pt's wife concerned that he has a lot going on including having to help take care of his mom with his siblings.       Patient will continue to benefit from skilled PT services to modify and progress therapeutic interventions, address functional mobility deficits, address ROM deficits, address strength deficits, analyze and address soft tissue restrictions, analyze and cue movement patterns, analyze and modify body mechanics/ergonomics, assess and modify postural abnormalities, address imbalance/dizziness, and instruct in home and community integration to attain remaining goals.      []  See Plan of Care  [x]  See progress note/recertification  []  See Discharge Summary         Progress towards goals / Updated goals:      PLAN  []  Upgrade activities as tolerated     []  Continue plan of care  []  Update interventions per flow sheet       []  Discharge due to:_  []  Other:_      Richelle Artis, PT 11/14/2022  10:56 AM    Future Appointments   Date Time Provider Talon Della   11/16/2022  9:00 AM Dmitry Bhandari PTA [facility]   11/21/2022  9:00 AM Dmitry Bhandari PTA [facility]   12/5/2022  4:00 PM DO SIVA Santoyo BS AMB   12/19/2022 10:20 AM Camilla Fay MD [facility] BS AMB

## 2022-11-16 ENCOUNTER — HOSPITAL ENCOUNTER (OUTPATIENT)
Dept: PHYSICAL THERAPY | Age: 67
Discharge: HOME | End: 2022-11-16
Attending: STUDENT IN AN ORGANIZED HEALTH CARE EDUCATION/TRAINING PROGRAM
Payer: MEDICARE

## 2022-11-16 PROCEDURE — 97110 THERAPEUTIC EXERCISES: CPT

## 2022-11-16 PROCEDURE — 97112 NEUROMUSCULAR REEDUCATION: CPT

## 2022-11-16 NOTE — PROGRESS NOTES
PT DAILY TREATMENT NOTE     Patient Name: Dennis Carranza. Date:2022  : 1955  [x]  Patient  Verified  Payor: [facility] MEDICARE / Plan: [facility] COMMUNITY PLAN MCR / Product Type: Managed Care Medicare /    In time:900  Out time:938  Total Treatment Time (min): 38  Visit #: 2 of     Medicare/BCBS Only   Total Timed Codes (min):  38 1:1 Treatment Time:  38       Treatment Area: Lower extremity weakness [R29.898]  Balance problem [R26.89]  Other low back pain [M54.59]    SUBJECTIVE  Pain Level (0-10 scale): 7/10  Any medication changes, allergies to medications, adverse drug reactions, diagnosis change, or new procedure performed?: [x] No    [] Yes (see summary sheet for update)  Subjective functional status/changes:   [] No changes reported  Pt stated that he is having some pain in his legs today    OBJECTIVE    30 min Therapeutic Exercise:  [x] See flow sheet :   Rationale: increase ROM, increase strength, improve coordination, and improve balance to improve the patients ability to ease with adl's. 8 min Neuromuscular Re-education:  [x]  See flow sheet :   Rationale: increase ROM, increase strength, improve coordination, and improve balance  to improve the patients ability to ease with adl's          With   [x] TE   [] TA   [] neuro   [] other: Patient Education: [x] Review HEP    [] Progressed/Changed HEP based on:   [] positioning   [] body mechanics   [] transfers   [] heat/ice application    [] other:      Other Objective/Functional Measures:   Was lethargic during session and feel asleep a few times during exercises  Added LAQ/HSC  No complaint of increased pain during session   No LOB with static balance     Pain Level (0-10 scale) post treatment: 6/10    ASSESSMENT/Changes in Function:   Pt is making slow progress toward goals. Pt cont with significant pain in B LE. Cont to have decreased strength in B hips and B LE. Cont with decreased standing and walking tolerance.  Cont with decreased SLS bilaterally    Patient will continue to benefit from skilled PT services to modify and progress therapeutic interventions, address functional mobility deficits, address ROM deficits, address strength deficits, analyze and cue movement patterns, analyze and modify body mechanics/ergonomics, assess and modify postural abnormalities, address imbalance/dizziness, and instruct in home and community integration to attain remaining goals. []  See Plan of Care  [x]  See progress note/recertification  []  See Discharge Summary         Progress towards goals / Updated goals:     Goal: Pt will increase FOTO score by  9  pts to improve function. Status at evaluation/last progress note: FOTO=58     2. Goal: Pt will increase DGI>20/24 to decrease fall risk. Status at evaluation/last progress note: DGI=19/24     3. Goal: Pt will navigate an obstacle course w/o LOB to be able to safely Navigate his surroundings. Status at evaluation/last progress note:Pt has difficulty stepping over obstacles placed on floor. 4. Pt will perform SLS >3 sec bilaterally to improve with ease of Ambulation/Balance/ADL's.    Status at evaluation/last progress note:   SLS=left 2 sec/right=3sec    PLAN  []  Upgrade activities as tolerated     [x]  Continue plan of care  []  Update interventions per flow sheet       []  Discharge due to:_  []  Other:_      Cortes Poor, PTA 11/16/2022  6:36 AM    Future Appointments   Date Time Provider Talon Hull   11/16/2022  9:00 AM Abbe Carrington PTA [facility]   11/21/2022  9:00 AM Abbe Carrington PTA [facility]   12/5/2022  4:00 PM Tong Quiñones DO [facility] BS AMB   12/19/2022 10:20 AM Salvador Schmid MD [facility] BS AMB

## 2022-11-16 NOTE — PROGRESS NOTES
There were no lung nodules found on your lung CT exam. It did show a mild dilation in your aorta in which a CTA is recommended. This is an additional test that takes a closer look at the blood flow of the arteries in the body. I will order this test and they will contact you to come and get it done. Once we receive the results we will determine what to do next.

## 2022-11-17 DIAGNOSIS — I77.810 ASCENDING AORTA DILATATION (HCC): Primary | ICD-10-CM

## 2022-11-21 ENCOUNTER — HOSPITAL ENCOUNTER (OUTPATIENT)
Dept: PHYSICAL THERAPY | Age: 67
Discharge: HOME | End: 2022-11-21
Attending: STUDENT IN AN ORGANIZED HEALTH CARE EDUCATION/TRAINING PROGRAM
Payer: MEDICARE

## 2022-11-21 PROCEDURE — 97530 THERAPEUTIC ACTIVITIES: CPT

## 2022-11-21 NOTE — PROGRESS NOTES
Physical Therapy Discharge Instructions      [facility] - Nanda Yusuf  [address] [place] [address]  [phone] fax    Patient: Janay Jade.   : 1955      Continue Home Exercise Program 2-3 times per day for 4-6 weeks      Continue with    [] Ice       [] Heat           Follow up with MD:     [] Upon completion of therapy     [x] As needed      Recommendations:     [x]   Return to activity with home program    []   Return to activity with the following modifications:       []Post Rehab Program    []Join Independent aquatic program     []Return to/join local gym        Additional Comments:           Kaden Ellis, PTA 2022 9:19 AM

## 2022-11-21 NOTE — PROGRESS NOTES
PT DAILY TREATMENT NOTE     Patient Name: Rashaun Phillips. Date:2022  : 1955  [x]  Patient  Verified  Payor: [facility] MEDICARE / Plan: [facility] COMMUNITY PLAN MCR / Product Type: Managed Care Medicare /    In time:900  Out time:911  Total Treatment Time (min): 11  Visit #: 3 of     Medicare/BCBS Only   Total Timed Codes (min):  11 1:1 Treatment Time:  11       Treatment Area: Lower extremity weakness [R29.898]  Balance problem [R26.89]  Other low back pain [M54.59]    SUBJECTIVE  Pain Level (0-10 scale): 10/10  Any medication changes, allergies to medications, adverse drug reactions, diagnosis change, or new procedure performed?: [x] No    [] Yes (see summary sheet for update)  Subjective functional status/changes:   [] No changes reported  Pt stated that he is not feeling good today and does not want to perform exercises or cont with therapy    OBJECTIVE    11 min Therapeutic Activity:  [x]  See flow sheet :BP, FOTO and goal assessment   Rationale: increase strength, improve coordination, improve balance, and increase proprioception  to improve the patients ability to increase ease with ADLs      With   [x] TE   [] TA   [] neuro   [] other: Patient Education: [x] Review HEP    [] Progressed/Changed HEP based on:   [] positioning   [] body mechanics   [] transfers   [] heat/ice application    [] other:      Other Objective/Functional Measures:   FOTO 59   Pt requested to not perform exercises today due to significant pain level    Pain Level (0-10 scale) post treatment: 10/10    ASSESSMENT/Changes in Function:     []  See Plan of Care  []  See progress note/recertification  [x]  See Discharge Summary         Progress towards goals / Updated goals:     Goal: Pt will increase FOTO score by  9  pts to improve function. Status at evaluation/last progress note: FOTO=58  Not met. Remained 59/100  2. Goal: Pt will increase DGI>20/24 to decrease fall risk.    Status at evaluation/last progress note: DGI=19/24  Unable to assess due to significant pain   3. Goal: Pt will navigate an obstacle course w/o LOB to be able to safely Navigate his surroundings. Status at evaluation/last progress note:Pt has difficulty stepping over obstacles placed on floor. Unable to assess due to significant pain  4. Pt will perform SLS >3 sec bilaterally to improve with ease of Ambulation/Balance/ADL's.    Status at evaluation/last progress note:   SLS=left 2 sec/right=3sec    PLAN  []  Upgrade activities as tolerated     []  Continue plan of care  []  Update interventions per flow sheet       [x]  Discharge due to:patient request  []  Other:_      Celi Santiago PTA 11/21/2022  6:36 AM    Future Appointments   Date Time Provider Talon Hull   11/21/2022  9:00 AM Ran Shell PTA [id number]   12/5/2022  4:00 PM Zach Shine DO [facility] BS AMB   12/19/2022 10:20 AM Sirean Arevalo MD [facility] BS AMB

## 2022-12-05 ENCOUNTER — OFFICE VISIT (OUTPATIENT)
Dept: INTERNAL MEDICINE CLINIC | Age: 67
End: 2022-12-05
Payer: MEDICARE

## 2022-12-05 VITALS
SYSTOLIC BLOOD PRESSURE: 123 MMHG | TEMPERATURE: 97 F | HEART RATE: 64 BPM | HEIGHT: 70 IN | RESPIRATION RATE: 20 BRPM | BODY MASS INDEX: 26.34 KG/M2 | OXYGEN SATURATION: 98 % | DIASTOLIC BLOOD PRESSURE: 57 MMHG | WEIGHT: 184 LBS

## 2022-12-05 DIAGNOSIS — R06.83 SNORING: ICD-10-CM

## 2022-12-05 DIAGNOSIS — M25.561 CHRONIC PAIN OF RIGHT KNEE: ICD-10-CM

## 2022-12-05 DIAGNOSIS — M54.50 CHRONIC MIDLINE LOW BACK PAIN WITHOUT SCIATICA: ICD-10-CM

## 2022-12-05 DIAGNOSIS — G89.29 CHRONIC MIDLINE LOW BACK PAIN WITHOUT SCIATICA: ICD-10-CM

## 2022-12-05 DIAGNOSIS — G89.29 CHRONIC PAIN OF RIGHT KNEE: ICD-10-CM

## 2022-12-05 DIAGNOSIS — M48.02 CERVICAL STENOSIS OF SPINE: ICD-10-CM

## 2022-12-05 DIAGNOSIS — F17.200 TOBACCO USE DISORDER: ICD-10-CM

## 2022-12-05 DIAGNOSIS — I77.810 ASCENDING AORTA DILATATION (HCC): ICD-10-CM

## 2022-12-05 DIAGNOSIS — Z71.6 ENCOUNTER FOR TOBACCO USE CESSATION COUNSELING: ICD-10-CM

## 2022-12-05 DIAGNOSIS — Z00.00 MEDICARE ANNUAL WELLNESS VISIT, SUBSEQUENT: Primary | ICD-10-CM

## 2022-12-05 DIAGNOSIS — E78.2 MIXED HYPERLIPIDEMIA: ICD-10-CM

## 2022-12-05 DIAGNOSIS — I10 PRIMARY HYPERTENSION: ICD-10-CM

## 2022-12-05 DIAGNOSIS — Z71.89 ADVANCED DIRECTIVES, COUNSELING/DISCUSSION: ICD-10-CM

## 2022-12-05 PROCEDURE — G8427 DOCREV CUR MEDS BY ELIG CLIN: HCPCS | Performed by: STUDENT IN AN ORGANIZED HEALTH CARE EDUCATION/TRAINING PROGRAM

## 2022-12-05 PROCEDURE — G8536 NO DOC ELDER MAL SCRN: HCPCS | Performed by: STUDENT IN AN ORGANIZED HEALTH CARE EDUCATION/TRAINING PROGRAM

## 2022-12-05 PROCEDURE — 1123F ACP DISCUSS/DSCN MKR DOCD: CPT | Performed by: STUDENT IN AN ORGANIZED HEALTH CARE EDUCATION/TRAINING PROGRAM

## 2022-12-05 PROCEDURE — 99406 BEHAV CHNG SMOKING 3-10 MIN: CPT | Performed by: STUDENT IN AN ORGANIZED HEALTH CARE EDUCATION/TRAINING PROGRAM

## 2022-12-05 PROCEDURE — 3074F SYST BP LT 130 MM HG: CPT | Performed by: STUDENT IN AN ORGANIZED HEALTH CARE EDUCATION/TRAINING PROGRAM

## 2022-12-05 PROCEDURE — 99215 OFFICE O/P EST HI 40 MIN: CPT | Performed by: STUDENT IN AN ORGANIZED HEALTH CARE EDUCATION/TRAINING PROGRAM

## 2022-12-05 PROCEDURE — G8417 CALC BMI ABV UP PARAM F/U: HCPCS | Performed by: STUDENT IN AN ORGANIZED HEALTH CARE EDUCATION/TRAINING PROGRAM

## 2022-12-05 PROCEDURE — 99497 ADVNCD CARE PLAN 30 MIN: CPT | Performed by: STUDENT IN AN ORGANIZED HEALTH CARE EDUCATION/TRAINING PROGRAM

## 2022-12-05 PROCEDURE — 3017F COLORECTAL CA SCREEN DOC REV: CPT | Performed by: STUDENT IN AN ORGANIZED HEALTH CARE EDUCATION/TRAINING PROGRAM

## 2022-12-05 PROCEDURE — 1101F PT FALLS ASSESS-DOCD LE1/YR: CPT | Performed by: STUDENT IN AN ORGANIZED HEALTH CARE EDUCATION/TRAINING PROGRAM

## 2022-12-05 PROCEDURE — 3078F DIAST BP <80 MM HG: CPT | Performed by: STUDENT IN AN ORGANIZED HEALTH CARE EDUCATION/TRAINING PROGRAM

## 2022-12-05 PROCEDURE — G0439 PPPS, SUBSEQ VISIT: HCPCS | Performed by: STUDENT IN AN ORGANIZED HEALTH CARE EDUCATION/TRAINING PROGRAM

## 2022-12-05 PROCEDURE — G8510 SCR DEP NEG, NO PLAN REQD: HCPCS | Performed by: STUDENT IN AN ORGANIZED HEALTH CARE EDUCATION/TRAINING PROGRAM

## 2022-12-05 NOTE — PROGRESS NOTES
Phil Arevalo. is a 75 y.o. male (: 1955) presenting to address:    Chief Complaint   Patient presents with    Hypertension       Vitals:    22 1600   BP: (!) 123/57   Pulse: 64   Resp: 20   Temp: 97 °F (36.1 °C)   TempSrc: Temporal   SpO2: 98%   Weight: 184 lb (83.5 kg)   Height: 5' 10\" (1.778 m)   PainSc:   8   PainLoc: Leg       Hearing/Vision:   No results found. Learning Assessment:   No flowsheet data found. Depression Screening:     3 most recent PHQ Screens 2022   Little interest or pleasure in doing things Not at all   Feeling down, depressed, irritable, or hopeless Not at all   Total Score PHQ 2 0     Fall Risk Assessment:     Fall Risk Assessment, last 12 mths 2022   Able to walk? Yes   Fall in past 12 months? 0     Abuse Screening:   No flowsheet data found. Coordination of Care Questionaire:     Advanced Directive:   1. Do you have an Advanced Directive? NO    2. Would you like information on Advanced Directives? NO    1. \"Have you been to the ER, urgent care clinic since your last visit? Hospitalized since your last visit? \" No    2. \"Have you seen or consulted any other health care providers outside of the [address] [place] [address] since your last visit? \" No     3. For patients aged 39-70: Has the patient had a colonoscopy? Yes - Care Gap present. Rooming MA/LPN to request most recent results     If the patient is female:    4. For patients aged 41-77: Has the patient had a mammogram within the past 2 years? No    5. For patients aged 21-65: Has the patient had a pap smear?  No

## 2022-12-05 NOTE — PROGRESS NOTES
This is the Subsequent Medicare Annual Wellness Exam, performed 12 months or more after the Initial AWV or the last Subsequent AWV    I have reviewed the patient's medical history in detail and updated the computerized patient record. Assessment/Plan   Education and counseling provided:  Are appropriate based on today's review and evaluation  End-of-Life planning (with patient's consent)  Colorectal cancer screening tests    1. Medicare annual wellness visit, subsequent  2. Primary hypertension  3. Cervical stenosis of spine, C3-C4, jx  4. Chronic midline low back pain without sciatica  5. Chronic pain of right knee  6. Snoring  7. Advanced directives, counseling/discussion  8. Tobacco use disorder  9. Ascending aorta dilatation (HCC)  10. Mixed hyperlipidemia  11. Encounter for tobacco use cessation counseling   Counseled on implementing healthy lifestyle routines. Cutting back on refined sugars and grains and substituting for whole grains, eating fresh fruits and vegetables, nuts and lean protein. Important to get 150 min of exercise a week. Discussed the following vaccinations based on CDC recommendations: TDAP, COVID, Influenza, PNA, Shingrix. UTD  Discussed recommended routine screenings for colonoscopy. Discussed cardiovasuar health, medications to avoid and foods to eliminate from diet. Also discussed lifestyle adjustments in diet and exercise. Spent at least 25 min discussing and explaining advanced care, information provided today  Counseled on tobacco cessation, NRT and harm reduction.  Discussed at length for at least 10 min   Discussed fall prevention    Depression Risk Factor Screening     3 most recent PHQ Screens 12/5/2022   Little interest or pleasure in doing things Not at all   Feeling down, depressed, irritable, or hopeless Not at all   Total Score PHQ 2 0       Alcohol & Drug Abuse Risk Screen    Do you average more than 1 drink per night or more than 7 drinks a week: No    In the past three months have you have had more than 4 drinks containing alcohol on one occasion: No          Functional Ability and Level of Safety    Hearing: Hearing is good. Activities of Daily Living: The home contains: handrails and grab bars  Patient does total self care      Ambulation:  Uses rollerator when pain is severe     Fall Risk: Has had 1 fall in July in which he passed out fr a brief moment. Fall Risk Assessment, last 12 mths 12/5/2022   Able to walk? Yes   Fall in past 12 months? 1   Do you feel unsteady? 0   Are you worried about falling 0   Is the gait abnormal? 1   Number of falls in past 12 months 1   Fall with injury? 0      Abuse Screen:  Patient is not abused       Cognitive Screening    Has your family/caregiver stated any concerns about your memory: yes - Last MME was      Cognitive Screening: Normal - MMSE (Mini Mental Status Exam)    Health Maintenance Due     Health Maintenance Due   Topic Date Due    Colorectal Cancer Screening Combo  Never done       Patient Care Team   Patient Care Team:  Merlin Charles DO as PCP - General (Family Medicine)  Merlin Charles DO as PCP - [facility] Empaneled Provider    History   There is no problem list on file for this patient. Past Medical History:   Diagnosis Date    Neuropathy       Past Surgical History:   Procedure Laterality Date    HX APPENDECTOMY      HX CERVICAL FUSION  2012    Liana Love  2011    Dr. Tyshawn Gr     Current Outpatient Medications   Medication Sig Dispense Refill    atorvastatin (LIPITOR) 10 mg tablet Take 1 Tablet by mouth daily. 90 Tablet 0    cetirizine (ZYRTEC) 10 mg tablet Take 1 Tablet by mouth daily. 90 Tablet 0    albuterol (PROVENTIL HFA, VENTOLIN HFA, PROAIR HFA) 90 mcg/actuation inhaler Take 2 Puffs by inhalation every six (6) hours as needed for Shortness of Breath. 18 g 2    lisinopriL (PRINIVIL, ZESTRIL) 10 mg tablet Take 1 Tablet by mouth daily.  90 Tablet 1    methocarbamoL (Robaxin) 500 mg tablet Take 1 Tablet by mouth two (2) times daily as needed for Muscle Spasm(s). Take 1 tab po BID as needed for pain/spasms 180 Tablet 1    fluticasone propionate (FLOVENT HFA) 110 mcg/actuation inhaler Take 1 Puff by inhalation every twelve (12) hours. 12 g 1    triamcinolone acetonide (KENALOG) 0.025 % topical cream Apply  to affected area two (2) times a day. use thin layer 15 g 0     No Known Allergies    History reviewed. No pertinent family history.   Social History     Tobacco Use    Smoking status: Every Day     Packs/day: 0.50     Types: Cigarettes    Smokeless tobacco: Never   Substance Use Topics    Alcohol use: Not Currently     Alcohol/week: 1.0 standard drink     Types: 1 Cans of beer per week         George Pitt,

## 2022-12-05 NOTE — PATIENT INSTRUCTIONS
[address] physician  [address]  [address]  [address]  Phone: [phone]  Medicare Wellness Visit, Male    The best way to live healthy is to have a lifestyle where you eat a well-balanced diet, exercise regularly, limit alcohol use, and quit all forms of tobacco/nicotine, if applicable. Regular preventive services are another way to keep healthy. Preventive services (vaccines, screening tests, monitoring & exams) can help personalize your care plan, which helps you manage your own care. Screening tests can find health problems at the earliest stages, when they are easiest to treat. [facility] follows the current, evidence-based guidelines published by the [facility] Oniel ([facility]) when recommending preventive services for our patients. Because we follow these guidelines, sometimes recommendations change over time as research supports it. (For example, a prostate screening blood test is no longer routinely recommended for men with no symptoms). Of course, you and your doctor may decide to screen more often for some diseases, based on your risk and co-morbidities (chronic disease you are already diagnosed with). Preventive services for you include:  - Medicare offers their members a free annual wellness visit, which is time for you and your primary care provider to discuss and plan for your preventive service needs.  Take advantage of this benefit every year!    -Over the age of 72 should receive the recommended pneumonia vaccines.   -All adults should have a flu vaccine yearly.  -All adults should receive a tetanus vaccine every 10 years.  -Over the age of 48 should receive the shingles vaccines.    -All adults should be screened once for Hepatitis C.  -All adults age 38-68 who are overweight should have a diabetes screening test once every three years.   -Other screening tests & preventive services for persons with diabetes include: an eye exam to screen for diabetic retinopathy, a kidney function test, a foot exam, and stricter control over your cholesterol.   -Cardiovascular screening for adults with routine risk involves an electrocardiogram (ECG) at intervals determined by the provider.     -Colorectal cancer screening should be done for adults age 43-69 with no increased risk factors for colorectal cancer. There are a number of acceptable methods of screening for this type of cancer. Each test has its own benefits and drawbacks. Discuss with your provider what is most appropriate for you during your annual wellness visit. The different tests include: colonoscopy (considered the best screening method), a fecal occult blood test, a fecal DNA test, and sigmoidoscopy.    -Lung cancer screening is recommended annually with a low dose CT scan for adults between age 54 and 68, who have smoked at least 30 pack years (equivalent of 1 pack per day for 30 days), and who is a current smoker or quit less than 15 years ago. -An Abdominal Aortic Aneurysm (AAA) Screening is recommended for men age 73-68 who has ever smoked in their lifetime.      Here is a list of your current Health Maintenance items (your personalized list of preventive services) with a due date:  Health Maintenance Due   Topic Date Due    Colorectal Screening  Never done

## 2022-12-05 NOTE — PROGRESS NOTES
HISTORY OF PRESENT ILLNESS  Anupama Mendoza. is a 75 y.o. male. Here to fu on chronic conditions    HTN- Well controlled. Taking lisinopril 10mg. Daily. Denies changes in vision hearing or headaches    Chronic pain- Completed PT and he reports improvements. He is currently seeking additional help for disability. Has seen ortho in the past and his been worked up with MRI which confirmed severe stenosis and arthritis. Tobacco abuse- Recent low dose CT was negative for nodules. Did show incidental finding of dilated aorta. Ordered CTA abd  and is scheduled for 1/11. Denies any abd symptoms. Has cut back on tobacco use, 1 pack now last every other day. Review of Systems   Eyes:  Negative for blurred vision. Respiratory:  Negative for shortness of breath. Cardiovascular:  Negative for chest pain and palpitations. Gastrointestinal:  Negative for abdominal pain. Neurological:  Negative for dizziness and headaches. BP (!) 123/57 (BP 1 Location: Right upper arm, BP Patient Position: Sitting, BP Cuff Size: Adult)   Pulse 64   Temp 97 °F (36.1 °C) (Temporal)   Resp 20   Ht 5' 10\" (1.778 m)   Wt 184 lb (83.5 kg)   SpO2 98%   BMI 26.40 kg/m²     Physical Exam  Vitals reviewed. Constitutional:       Appearance: Normal appearance. HENT:      Mouth/Throat:      Comments: MASK  Cardiovascular:      Rate and Rhythm: Normal rate and regular rhythm. Pulses: Normal pulses. Heart sounds: Normal heart sounds. Pulmonary:      Effort: Pulmonary effort is normal.      Breath sounds: Normal breath sounds. Abdominal:      General: Abdomen is flat. Psychiatric:         Mood and Affect: Mood normal.       ASSESSMENT and PLAN    ICD-10-CM ICD-9-CM    1. Primary hypertension  I10 401.9       2. Cervical stenosis of spine, C3-C4, jx  M48.02 723.0       3. Chronic midline low back pain without sciatica  M54.50 724.2     G89.29 338.29       4.  Chronic pain of right knee  M25.561 719.46     G89.29 338.29       5. Snoring  R06.83 786.09       6. Advanced directives, counseling/discussion  Z71.89 V65.49       7. Medicare annual wellness visit, subsequent  Z00.00 V70.0       8. Tobacco use disorder  F17.200 305.1       9. Ascending aorta dilatation (HCC)  I77.810 447.71       10. Encounter for tobacco use cessation counseling  Z71.6 V65.42       BP well controlled on lisinopril 10mg, will continue. Discussed importance of limiting sodium in diet and getting 150min of exercise a day. Pt requesting medical disability work up. Informed pt that I do not perform disability assessements but referred him back to his orthopedic who was previously managing his care. Continue with Robaxin 500mg PRN. Filled out transit forms today. Still awaiting referral for sleep study establishment for snoring    Has upcoming appointment scheduled for aorta dilation. Will refer to cardio as appropriate. Encouraged to cut back on smoking. Counseled on tobacco cessation, NRT and harm reduction. Encouraged to participate in nicotine quit now program. Discussed at length for at least 5 min     Reviewed prior notes, labs and imaging from previous providers    Follow-up and Dispositions    Return in about 3 months (around 3/5/2023) for chronic conditions.

## 2022-12-14 NOTE — PROGRESS NOTES
[address], [address]  [place] [address]  Phone: [phone]  Fax: [phone]        Loren Fountain  : 1955  PCP: Lobito Arredondo,     PROGRESS NOTE      ASSESSMENT AND PLAN    {There are no diagnoses linked to this encounter. (Refresh or delete this SmartLink)}    Anupama Lees. is a 75 y.o. male ***. HISTORY OF PRESENT ILLNESS      Anupama Hicks. is a 75 y.o. male presents for follow up of .  2021 with diagnosis of cervical and lumbar stenosis, referred to home health, advised to follow up with Dr. Michelle Kendall. Pain Assessment  4/15/2021   Location of Pain Back   Location Modifiers -   Severity of Pain 8   Quality of Pain Dull; Other (Comment)   Quality of Pain Comment n/t to legs at times   Duration of Pain Persistent   Frequency of Pain Constant   Aggravating Factors Other (Comment)   Aggravating Factors Comment constant walking   Limiting Behavior Yes   Relieving Factors Nothing   Relieving Factors Comment -   Result of Injury No   Work-Related Injury -   Type of Injury -     Investigations:  C MRI 2020: Intact fusion from C4-C7. Mod stenosis at C3-C4  L MRI 2020: Fusion L4-S1 intact. Severe stenosis at L3-L4. Treatments patient has tried:  Physical therapy:Yes  Doing HEP: No  Beneficial medications: Robaxin 500 mg PRN  Failed medications: Gabapentin 300 mg TID-minimal benefit. Spinal injections: Unknown     Spinal surgery- yes. ACDF C4-7  with residuals. L4-5-S1 fusion  Dr. Mihcelle Kendall     No entries. PMHx of neuropathy. Pt is . No disability rating at the time he  from service for back/neck. Last worked  as a . He enjoys watching sports in his free time. PHYSICAL EXAMINATION    There were no vitals taken for this visit.     ***              Written by Alma Rosa Mariee, as dictated by Sary Suárez MD.

## 2022-12-16 NOTE — THERAPY DISCHARGE
[facility] [place]  [address]  [phone] fax    Physical Therapy Discharge Summary    Patient name: Rashaun Phillips. Start of Care: 10/10/2022   Referral source: Lobito Cervantes DO : 1955               Medical Diagnosis: Lower extremity weakness [R29.898]  Balance problem [R26.89]  Other low back pain [M54.59]  Payor: [facility] MEDICARE / Plan: [employer] MCR / Product Type: Managed Care Medicare /  Onset Date:approx 1 yr               Treatment Diagnosis: LE Weakness/ Gait abnormality/ LS and CS Pain   Prior Hospitalization: see medical history Provider#: [id number]   Medications: Verified on Patient summary List    Comorbidities: PTSD, Memory Changes, Tobacco Use, LS Fusion /CS Fusion . Ambulatory Dysfunction, Cognitive Impairments. Prior Level of Function: Retired . Pt lives with Wife and is somewhat dependent on her for ADL's in the mornings. Pt ambulates with a SPC. Visits from Start of Care: 10    Missed Visits: 1    Reporting Period : 22 to 22    Summary of Care:       Goal: Pt will increase FOTO score by  9  pts to improve function. Status at evaluation/last progress note: FOTO=58  Not met. Remained 59/100    2. Goal: Pt will increase DGI>20/24 to decrease fall risk. Status at evaluation/last progress note: DGI=19/24  Unable to assess due to significant pain     3. Goal: Pt will navigate an obstacle course w/o LOB to be able to safely Navigate his surroundings. Status at evaluation/last progress note:Pt has difficulty stepping over obstacles placed on floor. Unable to assess due to significant pain    4. Pt will perform SLS >3 sec bilaterally to improve with ease of Ambulation/Balance/ADL's. Status at evaluation/last progress note:   SLS=left 2 sec/right=3sec    Pt self DC due to 10/10 levels. His pain levels would fluctuate daily. Pt cont with significant pain in B LE.  Cont to have decreased strength in B hips and B LE. Cont with decreased standing and walking tolerance.         ASSESSMENT/RECOMMENDATIONS:  [x]Discontinue therapy: []Patient has reached or is progressing toward set goals      []Patient is non-compliant or has abdicated      []Due to lack of appreciable progress towards set goals    Peter Ramirez, PT 12/16/2022 10:43 AM

## 2022-12-19 ENCOUNTER — OFFICE VISIT (OUTPATIENT)
Dept: ORTHOPEDIC SURGERY | Age: 67
End: 2022-12-19
Payer: MEDICARE

## 2022-12-19 VITALS
SYSTOLIC BLOOD PRESSURE: 122 MMHG | HEART RATE: 86 BPM | TEMPERATURE: 97.9 F | DIASTOLIC BLOOD PRESSURE: 68 MMHG | OXYGEN SATURATION: 98 % | HEIGHT: 70 IN | WEIGHT: 180.2 LBS | BODY MASS INDEX: 25.8 KG/M2

## 2022-12-19 DIAGNOSIS — M54.50 LUMBAR PAIN: Primary | ICD-10-CM

## 2022-12-19 NOTE — PROGRESS NOTES
[address]  [address], [address]  [place] [address] [facility] [address]  Phone: [phone]  Fax: [phone]        Ana Cristina Crawley  : 1955  PCP: Tong Quiñones DO    PROGRESS NOTE      ASSESSMENT AND PLAN    Diagnoses and all orders for this visit:    1. Lumbar pain  -     AMB POC XRAY, SPINE, LUMBOSACRAL; 4+    Anupama Sheikh is a 75 y.o. male  with junctional stenosis at L3-L4. He is having more difficulty walking. He is not interested in surgery. Continue with home exercise. At patient's request, I dictated a letter regarding his current medical condition. He did not have a disability rating for his lumbar spine at the time of  from the [employer] in . I cannot give an opinion regarding causation. He brings with him some paperwork that I have bruised superficially that indicates an injury to his right knee. HISTORY OF PRESENT ILLNESS      Anupama Sheikh is a 75 y.o. male presents for follow up of .  2021 with diagnosis of cervical and lumbar stenosis, referred to home health, advised to follow up with Dr. Cherie Gutiérrez. The patient reports that physical therapy did not relieve his symptoms. The patient denies seeing Dr. Cherie Gutiérrez currently. The patient is requesting a letter with his diagnosis and medical opinion. He states that he has not been able to work since his spine surgery in . He states that he does not have a disability rating, and the last visit with the [facility] did not allow him to get the paperwork he needed. The patient's wife stated that his knees periodically give out when he walks outside.      The patient denies loss of bladder/bowel control     Pain Assessment  2022   Location of Pain Back;Knee   Location Modifiers -   Severity of Pain 8   Quality of Pain Dull   Quality of Pain Comment -   Duration of Pain Persistent   Frequency of Pain Constant   Aggravating Factors Standing;Walking   Aggravating Factors Comment -   Limiting Behavior Yes   Relieving Factors Elevation   Relieving Factors Comment -   Result of Injury -   Work-Related Injury -   Type of Injury -     Investigations:  C MRI 5/2020: Intact fusion from C4-C7. Mod stenosis at C3-C4  L MRI 5/2020: Fusion L4-S1 intact. Severe stenosis at L3-L4. Lumbar AP lateral flexion, extension 12/19/2022: intact hardware L4-S1; small anterior osteophytes at L3-L4; no instability     Treatments patient has tried:  Physical therapy:Yes 11/2022 - denies relief of symptoms  Doing HEP: No  Beneficial medications: Robaxin 500 mg PRN  Failed medications: Gabapentin 300 mg TID-minimal benefit. Spinal injections: Unknown     Spinal surgery- yes. ACDF C4-7 2012 with residuals. L4-5-S1 fusion 2011 Dr. Shanika Patel      PMHx of neuropathy. Work status: Pt is a . He  from the service in 1974. He states that he fell into a hole in 1973 and had an injury at this time to his right knee. No disability rating at the time he  from service for back/neck. Last worked 2012 as a . He enjoys watching sports in his free time.        PHYSICAL EXAMINATION    Visit Vitals  /68   Pulse 86   Temp 97.9 °F (36.6 °C) (Temporal)   Ht 5' 10\" (1.778 m)   Wt 180 lb 3.2 oz (81.7 kg)   SpO2 98%   BMI 25.86 kg/m²       Ambulates with rollator   Diffusely tender lumbar spine   LE 4/5 hip flexors, hamstrings, quads   Intact DF, PF   SLR negative   No edema               Written by Srinivasa Apple, as dictated by Lynne Thomas MD.

## 2022-12-19 NOTE — LETTER
MEDICAL STATEMENT    12/19/2022 10:40 AM    Mr. Rashaun Phillips.  [address] [facility] [address]      To Whom It May Concern:    Anupama Keerthisenne Era. is currently under the care of [facility]. Mr. Casey Kumar was evaluated in the office today. He is concerned about worsening back pain and inability to walk. He brings in paperwork from his time in the service. He gives a history of a fall into a hole in about 1973. He  from the [employer] in 1974. He reports intermittent low back pain since that time. This eventually led to him requiring a lumbar fusion in 2011. The surgery helped. Over time he is gotten worse. 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 He is not interested in further surgery. He has been through recent physical therapy. Patient reports that since his lumbar fusion in 2011, he has been unable to work. I agree that he is totally and permanently disabled due to his lumbar spine. If you have any further questions or concerns please do not hesitate to contact me. If there are questions or concerns please have the patient contact our office.         Sincerely,          Cindy Steiner MD

## 2022-12-20 ENCOUNTER — HOSPITAL ENCOUNTER (OUTPATIENT)
Dept: LAB | Age: 67
Discharge: HOME | End: 2022-12-20
Payer: MEDICARE

## 2022-12-20 ENCOUNTER — OFFICE VISIT (OUTPATIENT)
Dept: INTERNAL MEDICINE CLINIC | Age: 67
End: 2022-12-20
Payer: MEDICARE

## 2022-12-20 VITALS
DIASTOLIC BLOOD PRESSURE: 70 MMHG | HEART RATE: 86 BPM | WEIGHT: 178 LBS | TEMPERATURE: 96.9 F | BODY MASS INDEX: 25.54 KG/M2 | SYSTOLIC BLOOD PRESSURE: 120 MMHG | OXYGEN SATURATION: 100 %

## 2022-12-20 DIAGNOSIS — I69.391 DYSPHAGIA DUE TO RECENT CEREBROVASCULAR ACCIDENT (CVA): Primary | ICD-10-CM

## 2022-12-20 DIAGNOSIS — E11.49 CONTROLLED TYPE 2 DIABETES MELLITUS WITH OTHER NEUROLOGIC COMPLICATION, WITHOUT LONG-TERM CURRENT USE OF INSULIN (HCC): ICD-10-CM

## 2022-12-20 DIAGNOSIS — E78.2 MIXED HYPERLIPIDEMIA: ICD-10-CM

## 2022-12-20 DIAGNOSIS — I69.398 CVA, OLD, ALTERATIONS OF SENSATIONS: ICD-10-CM

## 2022-12-20 DIAGNOSIS — R20.9 CVA, OLD, ALTERATIONS OF SENSATIONS: ICD-10-CM

## 2022-12-20 DIAGNOSIS — I69.391 DYSPHAGIA DUE TO RECENT CEREBROVASCULAR ACCIDENT (CVA): ICD-10-CM

## 2022-12-20 DIAGNOSIS — I10 PRIMARY HYPERTENSION: ICD-10-CM

## 2022-12-20 LAB
ALBUMIN SERPL-MCNC: 4 G/DL (ref 3.4–5)
ALBUMIN/GLOB SERPL: 0.9 {RATIO} (ref 0.8–1.7)
ALP SERPL-CCNC: 92 U/L (ref 45–117)
ALT SERPL-CCNC: 45 U/L (ref 16–61)
ANION GAP SERPL CALC-SCNC: 7 MMOL/L (ref 3–18)
AST SERPL-CCNC: 27 U/L (ref 10–38)
BASOPHILS # BLD: 0.1 K/UL (ref 0–0.1)
BASOPHILS NFR BLD: 1 % (ref 0–2)
BILIRUB SERPL-MCNC: 0.4 MG/DL (ref 0.2–1)
BUN SERPL-MCNC: 24 MG/DL (ref 7–18)
BUN/CREAT SERPL: 22 (ref 12–20)
CALCIUM SERPL-MCNC: 10 MG/DL (ref 8.5–10.1)
CHLORIDE SERPL-SCNC: 103 MMOL/L (ref 100–111)
CO2 SERPL-SCNC: 29 MMOL/L (ref 21–32)
CREAT SERPL-MCNC: 1.11 MG/DL (ref 0.6–1.3)
DIFFERENTIAL METHOD BLD: ABNORMAL
EOSINOPHIL # BLD: 0.3 K/UL (ref 0–0.4)
EOSINOPHIL NFR BLD: 3 % (ref 0–5)
ERYTHROCYTE [DISTWIDTH] IN BLOOD BY AUTOMATED COUNT: 14.4 % (ref 11.6–14.5)
GLOBULIN SER CALC-MCNC: 4.4 G/DL (ref 2–4)
GLUCOSE SERPL-MCNC: 107 MG/DL (ref 74–99)
HBA1C MFR BLD HPLC: 5.9 %
HCT VFR BLD AUTO: 40.1 % (ref 36–48)
HGB BLD-MCNC: 13 G/DL (ref 13–16)
IMM GRANULOCYTES # BLD AUTO: 0 K/UL (ref 0–0.04)
IMM GRANULOCYTES NFR BLD AUTO: 0 % (ref 0–0.5)
LYMPHOCYTES # BLD: 3.9 K/UL (ref 0.9–3.6)
LYMPHOCYTES NFR BLD: 35 % (ref 21–52)
MCH RBC QN AUTO: 28 PG (ref 24–34)
MCHC RBC AUTO-ENTMCNC: 32.4 G/DL (ref 31–37)
MCV RBC AUTO: 86.4 FL (ref 78–100)
MONOCYTES # BLD: 1 K/UL (ref 0.05–1.2)
MONOCYTES NFR BLD: 9 % (ref 3–10)
NEUTS SEG # BLD: 6 K/UL (ref 1.8–8)
NEUTS SEG NFR BLD: 53 % (ref 40–73)
NRBC # BLD: 0 K/UL (ref 0–0.01)
NRBC BLD-RTO: 0 PER 100 WBC
PLATELET # BLD AUTO: 350 K/UL (ref 135–420)
PMV BLD AUTO: 10.3 FL (ref 9.2–11.8)
POTASSIUM SERPL-SCNC: 4.7 MMOL/L (ref 3.5–5.5)
PROT SERPL-MCNC: 8.4 G/DL (ref 6.4–8.2)
RBC # BLD AUTO: 4.64 M/UL (ref 4.35–5.65)
SODIUM SERPL-SCNC: 139 MMOL/L (ref 136–145)
WBC # BLD AUTO: 11.3 K/UL (ref 4.6–13.2)

## 2022-12-20 PROCEDURE — 3017F COLORECTAL CA SCREEN DOC REV: CPT | Performed by: INTERNAL MEDICINE

## 2022-12-20 PROCEDURE — 99214 OFFICE O/P EST MOD 30 MIN: CPT | Performed by: INTERNAL MEDICINE

## 2022-12-20 PROCEDURE — 2022F DILAT RTA XM EVC RTNOPTHY: CPT | Performed by: INTERNAL MEDICINE

## 2022-12-20 PROCEDURE — 3074F SYST BP LT 130 MM HG: CPT | Performed by: INTERNAL MEDICINE

## 2022-12-20 PROCEDURE — 36415 COLL VENOUS BLD VENIPUNCTURE: CPT

## 2022-12-20 PROCEDURE — 3044F HG A1C LEVEL LT 7.0%: CPT | Performed by: INTERNAL MEDICINE

## 2022-12-20 PROCEDURE — 1123F ACP DISCUSS/DSCN MKR DOCD: CPT | Performed by: INTERNAL MEDICINE

## 2022-12-20 PROCEDURE — 3078F DIAST BP <80 MM HG: CPT | Performed by: INTERNAL MEDICINE

## 2022-12-20 PROCEDURE — 83036 HEMOGLOBIN GLYCOSYLATED A1C: CPT | Performed by: INTERNAL MEDICINE

## 2022-12-20 PROCEDURE — 85025 COMPLETE CBC W/AUTO DIFF WBC: CPT

## 2022-12-20 PROCEDURE — G8417 CALC BMI ABV UP PARAM F/U: HCPCS | Performed by: INTERNAL MEDICINE

## 2022-12-20 PROCEDURE — G8510 SCR DEP NEG, NO PLAN REQD: HCPCS | Performed by: INTERNAL MEDICINE

## 2022-12-20 PROCEDURE — 80053 COMPREHEN METABOLIC PANEL: CPT

## 2022-12-20 PROCEDURE — G8536 NO DOC ELDER MAL SCRN: HCPCS | Performed by: INTERNAL MEDICINE

## 2022-12-20 PROCEDURE — 1101F PT FALLS ASSESS-DOCD LE1/YR: CPT | Performed by: INTERNAL MEDICINE

## 2022-12-20 PROCEDURE — G8427 DOCREV CUR MEDS BY ELIG CLIN: HCPCS | Performed by: INTERNAL MEDICINE

## 2022-12-20 RX ORDER — ALPRAZOLAM 1 MG/1
1 TABLET ORAL ONCE
Qty: 1 TABLET | Refills: 0 | Status: SHIPPED | OUTPATIENT
Start: 2022-12-20 | End: 2022-12-20

## 2022-12-20 NOTE — PROGRESS NOTES
Progress Note    Patient: Deni Hernandez Sex: male                  YOB: 1955      Age:  75 y.o.                    HPI:     Deni Hernandez is a 75 y.o. male who has been seen for weight loss and trouble swallowing   He had an episode three days ago where he collapsed and could not stand for  few  minutes. He had a similar episode 3 months ago . He has had weakness and some gagging since then . Past Medical History:   Diagnosis Date    Neuropathy        Past Surgical History:   Procedure Laterality Date    HX APPENDECTOMY      HX CERVICAL FUSION  2012    Dalila Farmer  2011    Dr. Shanika Patel       No family history on file. Social History     Socioeconomic History    Marital status:    Tobacco Use    Smoking status: Every Day     Packs/day: 0.50     Years: 50.00     Pack years: 25.00     Types: Cigarettes    Smokeless tobacco: Never   Substance and Sexual Activity    Alcohol use: Not Currently     Alcohol/week: 1.0 standard drink     Types: 1 Cans of beer per week    Drug use: No         Current Outpatient Medications:     atorvastatin (LIPITOR) 10 mg tablet, Take 1 Tablet by mouth daily. , Disp: 90 Tablet, Rfl: 0    cetirizine (ZYRTEC) 10 mg tablet, Take 1 Tablet by mouth daily. , Disp: 90 Tablet, Rfl: 0    albuterol (PROVENTIL HFA, VENTOLIN HFA, PROAIR HFA) 90 mcg/actuation inhaler, Take 2 Puffs by inhalation every six (6) hours as needed for Shortness of Breath., Disp: 18 g, Rfl: 2    lisinopriL (PRINIVIL, ZESTRIL) 10 mg tablet, Take 1 Tablet by mouth daily. , Disp: 90 Tablet, Rfl: 1    methocarbamoL (Robaxin) 500 mg tablet, Take 1 Tablet by mouth two (2) times daily as needed for Muscle Spasm(s). Take 1 tab po BID as needed for pain/spasms, Disp: 180 Tablet, Rfl: 1    fluticasone propionate (FLOVENT HFA) 110 mcg/actuation inhaler, Take 1 Puff by inhalation every twelve (12) hours. , Disp: 12 g, Rfl: 1    triamcinolone acetonide (KENALOG) 0.025 % topical cream, Apply  to affected area two (2) times a day. use thin layer, Disp: 15 g, Rfl: 0     No Known Allergies    Review of Systems   Constitutional:  Positive for malaise/fatigue and weight loss. Negative for chills and fever. Eyes: Negative. Respiratory:  Positive for shortness of breath. Negative for cough. Cardiovascular:  Negative for chest pain. Gastrointestinal:  Positive for vomiting. Negative for constipation and diarrhea. He has had some gagging    Genitourinary: Negative. Neurological:  Positive for dizziness, focal weakness, loss of consciousness and weakness. Physical Exam:      Visit Vitals  /70 (BP 1 Location: Left upper arm, BP Patient Position: Sitting)   Pulse 86   Temp 96.9 °F (36.1 °C) (Temporal)   Wt 178 lb (80.7 kg)   SpO2 100%   BMI 25.54 kg/m²       Physical Exam  Constitutional:       Appearance: Normal appearance. Cardiovascular:      Rate and Rhythm: Normal rate and regular rhythm. Pulses: Normal pulses. Heart sounds: Normal heart sounds. No murmur heard. No friction rub. No gallop. Pulmonary:      Effort: Pulmonary effort is normal.      Breath sounds: Normal breath sounds. Skin:     General: Skin is warm and dry. Neurological:      General: No focal deficit present. Mental Status: He is alert and oriented to person, place, and time. Motor: Weakness present. Comments: Weakness rt hip flexors   50% weaker than opposite side   Psychiatric:         Mood and Affect: Mood normal.         Behavior: Behavior normal.        Labs Reviewed:      Assessment/Plan       ICD-10-CM ICD-9-CM    1. Dysphagia due to recent cerebrovascular accident (CVA)  S56.745 485.62 METABOLIC PANEL, COMPREHENSIVE      CBC WITH AUTOMATED DIFF      AMB POC HEMOGLOBIN A1C      CT HEAD WO CONT      2. CVA, old, alterations of sensations  I69.398 438.6 MRI BRAIN WO CONT    R20.9  AMB SUPPLY ORDER      3.  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 Primary hypertension  I10 401.9       5. Mixed hyperlipidemia  E78.2 272.2          Advised soft diet and liquids .        Josef Najera MD

## 2022-12-20 NOTE — PROGRESS NOTES
Pt reports difficulty swallowing solids, fatigue, and decreased appetite. Pt is also reporting bilateral foot pain. 1. \"Have you been to the ER, urgent care clinic since your last visit? Hospitalized since your last visit? \" No    2. \"Have you seen or consulted any other health care providers outside of the [address] [place] [address] since your last visit? \" No     3. For patients aged 39-70: Has the patient had a colonoscopy / FIT/ Cologuard? No      If the patient is female:    4. For patients aged 41-77: Has the patient had a mammogram within the past 2 years? NA - based on age or sex      11. For patients aged 21-65: Has the patient had a pap smear?  NA - based on age or sex

## 2022-12-28 DIAGNOSIS — E78.2 MIXED HYPERLIPIDEMIA: ICD-10-CM

## 2022-12-28 RX ORDER — ATORVASTATIN CALCIUM 10 MG/1
TABLET, FILM COATED ORAL
Qty: 90 TABLET | Refills: 0 | Status: SHIPPED | OUTPATIENT
Start: 2022-12-28

## 2023-01-01 DIAGNOSIS — L73.9 FOLLICULITIS: ICD-10-CM

## 2023-01-03 RX ORDER — TRIAMCINOLONE ACETONIDE 0.25 MG/G
CREAM TOPICAL
Qty: 15 G | Refills: 0 | Status: SHIPPED | OUTPATIENT
Start: 2023-01-03

## 2023-01-05 ENCOUNTER — OFFICE VISIT (OUTPATIENT)
Dept: ORTHOPEDIC SURGERY | Age: 68
End: 2023-01-05
Payer: MEDICARE

## 2023-01-05 ENCOUNTER — TELEPHONE (OUTPATIENT)
Dept: INTERNAL MEDICINE CLINIC | Age: 68
End: 2023-01-05

## 2023-01-05 VITALS — TEMPERATURE: 96.9 F | HEART RATE: 75 BPM

## 2023-01-05 DIAGNOSIS — M25.561 CHRONIC PAIN OF RIGHT KNEE: Primary | ICD-10-CM

## 2023-01-05 DIAGNOSIS — G54.9 MYELORADICULOPATHY: Primary | ICD-10-CM

## 2023-01-05 DIAGNOSIS — S82.831S: ICD-10-CM

## 2023-01-05 DIAGNOSIS — G89.29 CHRONIC PAIN OF RIGHT KNEE: Primary | ICD-10-CM

## 2023-01-05 NOTE — TELEPHONE ENCOUNTER
Pt would like to be referred to a rheumatologist. Referral can be sent to Dr. Alex Cary Phone number [phone].  Last seen 12/20/22 Next appt 3/7/23

## 2023-01-05 NOTE — PROGRESS NOTES
Patient: Silvestre Dawson. MRN: [id number]       SSN: [id number]  YOB: 1955        AGE: 75 y.o. SEX: male    PCP: Vladimir Healy DO  01/05/23    Chief Complaint   Patient presents with    Knee Pain     HISTORY:  Silvestre Ledesma is a 75 y.o. male who is seen for right knee and leg pain and swelling. He has been experiencing right knee pain for many years. He twisted his right knee in November 1973 when he stepped in a hole while stationed at [employer]. He was placed in a long leg cast for 6 weeks and did not undergo surgery. He states that he has been experiencing intermittent right knee and leg pain and swelling since the injury. He feels pain with standing, walking and stair climbing. He experiences startup pain after sitting. He takes Robaxin 500 mg BID as needed. He is s/p lumbar and cervical fusion by Dr. Jenn Osborne. Pain Assessment  1/5/2023   Location of Pain Knee   Location Modifiers Right   Severity of Pain 7   Quality of Pain Aching   Quality of Pain Comment -   Duration of Pain -   Frequency of Pain -   Aggravating Factors -   Aggravating Factors Comment -   Limiting Behavior -   Relieving Factors -   Relieving Factors Comment -   Result of Injury -   Work-Related Injury -   Type of Injury -     Occupation, etc:  Mr. Efe Holloway is retired. He worked as a  for [employer]. He served 2 years in the [employer] in 1972-74. He lives in [place] with his wife. He is caring for his 80 y.o. mother. He has 3 sons, 3 daughters, 23 grandchildren and 1 great-grandchild. Mr. Efe Holloway weighs 178 lbs and is 5'10\" tall. His wife accompanies him to today's office visit.     Lab Results   Component Value Date/Time    Hemoglobin A1c 5.8 (H) 09/26/2022 11:06 AM    Hemoglobin A1c (POC) 5.9 12/20/2022 02:45 PM     Weight Metrics 12/20/2022 12/19/2022 12/5/2022 9/26/2022 4/15/2021 9/9/2020 6/8/2020   Weight 178 lb 180 lb 3.2 oz 184 lb 180 lb 182 lb 179 lb 180 lb   BMI 25.54 kg/m2 25.86 kg/m2 26.4 kg/m2 25.83 kg/m2 26.11 kg/m2 25.68 kg/m2 25.83 kg/m2       There is no problem list on file for this patient. REVIEW OF SYSTEMS:    Constitutional Symptoms: Negative   Eyes: Negative   Ears, Nose, Throat and Mouth: Negative   Cardiovascular: Negative   Respiratory: Negative   Genitourinary: Per HPI   Gastrointestinal: Per HPI   Integumentary (Skin and/or Breast): Negative   Musculoskeletal: Per HPI   Endocrine/Rheumatologic: Negative   Neurological: Per HPI   Hematology/Lymphatic: Negative    Allergic/Immunologic: Negative   Phychiatric: Negative    Social History     Socioeconomic History    Marital status:      Spouse name: Not on file    Number of children: Not on file    Years of education: Not on file    Highest education level: Not on file   Occupational History    Not on file   Tobacco Use    Smoking status: Every Day     Packs/day: 0.50     Years: 50.00     Pack years: 25.00     Types: Cigarettes    Smokeless tobacco: Never   Substance and Sexual Activity    Alcohol use: Not Currently     Alcohol/week: 1.0 standard drink     Types: 1 Cans of beer per week    Drug use: No    Sexual activity: Not on file   Other Topics Concern    Not on file   Social History Narrative    Not on file     Social Determinants of Health     Financial Resource Strain: Not on file   Food Insecurity: Not on file   Transportation Needs: Not on file   Physical Activity: Not on file   Stress: Not on file   Social Connections: Not on file   Intimate Partner Violence: Not on file   Housing Stability: Not on file      No Known Allergies   Current Outpatient Medications   Medication Sig    triamcinolone acetonide (KENALOG) 0.025 % topical cream APPLY A THIN LAYER TO AFFECTED AREA TWICE A DAY    atorvastatin (LIPITOR) 10 mg tablet TAKE 1 TABLET BY MOUTH EVERY DAY    cetirizine (ZYRTEC) 10 mg tablet Take 1 Tablet by mouth daily.     albuterol (PROVENTIL HFA, VENTOLIN HFA, PROAIR HFA) 90 mcg/actuation inhaler Take 2 Puffs by inhalation every six (6) hours as needed for Shortness of Breath. lisinopriL (PRINIVIL, ZESTRIL) 10 mg tablet Take 1 Tablet by mouth daily. methocarbamoL (Robaxin) 500 mg tablet Take 1 Tablet by mouth two (2) times daily as needed for Muscle Spasm(s). Take 1 tab po BID as needed for pain/spasms    fluticasone propionate (FLOVENT HFA) 110 mcg/actuation inhaler Take 1 Puff by inhalation every twelve (12) hours. No current facility-administered medications for this visit. PHYSICAL EXAMINATION:  Visit Vitals  Pulse 75   Temp 96.9 °F (36.1 °C) (Temporal)      ORTHO EXAMINATION:  Examination Right knee Left knee   Skin Intact Intact   Range of motion 40-0 w/guarding 120-0   Effusion - -   Medial joint line tenderness + +   Lateral joint line tenderness - -   Popliteal tenderness - -   Osteophytes palpable + +   Mesfins - -   Patella crepitus + +   Anterior drawer - -   Lateral laxity - -   Medial laxity - -   Varus deformity - -   Valgus deformity - -   Pretibial edema - -   Calf tenderness - -    Ambulates with a single point cane    RADIOGRAPHS:  XR KNEE RT 3 V 01/05/2023 ASHUTOSH  IMPRESSION:  Three views with bilateral knees on AP view - Old healed proximal fibula fracture, no effusion, moderate bilateral joint space narrowing, small osteophytes present. Kellgren Fabrice grade 2     IMPRESSION:      ICD-10-CM ICD-9-CM    1. Chronic pain of right knee  M25.561 719.46 AMB POC X-RAY KNEE 3 VIEW    G89.29 338.29       2. Closed fracture of proximal end of right fibula, sequela  S82.831S 905.4         PLAN:  There is no need for injection or surgery at this time. Patient was provided with home knee exercises. He will follow up as needed.       Scribed by Patric Eaton ([address]) as dictated by Eric Glass MD

## 2023-01-10 ENCOUNTER — OFFICE VISIT (OUTPATIENT)
Dept: SLEEP MEDICINE | Age: 68
End: 2023-01-10
Payer: MEDICARE

## 2023-01-10 VITALS
HEIGHT: 70 IN | SYSTOLIC BLOOD PRESSURE: 143 MMHG | BODY MASS INDEX: 25.62 KG/M2 | OXYGEN SATURATION: 99 % | WEIGHT: 179 LBS | HEART RATE: 82 BPM | TEMPERATURE: 97.1 F | RESPIRATION RATE: 18 BRPM | DIASTOLIC BLOOD PRESSURE: 70 MMHG

## 2023-01-10 DIAGNOSIS — R06.83 SNORING: Primary | ICD-10-CM

## 2023-01-10 DIAGNOSIS — I10 PRIMARY HYPERTENSION: ICD-10-CM

## 2023-01-10 DIAGNOSIS — G25.81 RESTLESS LEG SYNDROME: ICD-10-CM

## 2023-01-10 DIAGNOSIS — R29.818 SUSPECTED SLEEP APNEA: ICD-10-CM

## 2023-01-10 DIAGNOSIS — Z72.821 POOR SLEEP HYGIENE: ICD-10-CM

## 2023-01-10 DIAGNOSIS — R40.0 DAYTIME SOMNOLENCE: ICD-10-CM

## 2023-01-10 DIAGNOSIS — Z86.73 HISTORY OF CVA (CEREBROVASCULAR ACCIDENT): ICD-10-CM

## 2023-01-10 NOTE — PATIENT INSTRUCTIONS
Please call our clinic back at [phone] or send a message on [employer] if you have any questions or concerns or if you are experiencing any of the following: You have not received a follow up appointment within 30 days prior the recommended follow up time. If you are not tolerating treatment plan and/or not able to obtain equipment or prescribed medication(s). if you are experiencing any difficulties with the [employer]  (DME) Company you may be using or is assigned to you. Two weeks have passed and you have not received an appointment for a scheduled procedure. Two weeks have passed since you underwent a test and/or procedure and you have not received your results. If you are using a CPAP/BIPAP, or Home Ventilator Device- Please note the following. Currently, many DMEs are experiencing supply chain difficulties and orders for equipment may be back logged several weeks to months. Your  Durable Medical Equipment (DME ) company is supposed to provide you with replacement filters, tubing and masks. You can either call your DME when you need new supplies or you can arrange for an automatic shipment schedule. Your need to be seen by our office at lat minimum of every 12 months in order to renew the prescription for these supplies. Please make note of who your DME company is and their phone number. Please make sure that you clean your mask and hosing on a regular basis. Your DME can provide you with additional information regarding proper care and cleaning of your device           Safety is strongly encouraged. You should not drive if sleepy, tired, distracted and/or  fatigued. If a procedure or imaging study has been ordered for your by this clinic please call the [facility] or [facility] at  [address] [place] [address] [place] and blood work do not require appointments.   They are considered \"Walk-In\" services and can be obtained at either [facility] or [facility].     Blood work is performed at the Laboratory (Lab) from 08:00am - 04:00pm      -Thank you

## 2023-01-10 NOTE — PROGRESS NOTES
[facility]  Pulmonary, Critical Care, and Sleep Medicine    Office Progress Note - Initial Evaluation      Primary Care Physician: Cheyanne Castellanos DO     Reason for Visit: Patient for sleep    Assessment:  1. Snoring  2. Daytime somnolence  3. Restless leg syndrome  4. Poor sleep hygiene  5. Primary hypertension  6. History of CVA (cerebrovascular accident)     Discussion: Mr. Scot Calle is a 57-year-old male with significant daytime somnolence, snoring and probable obstructive sleep apnea. Was difficult to tell Pj Patricia with the quality of his sleep was as he clearly did not want to discuss a lot of issues about his sleep and his wife did most of the talking today in reference to his medical history and sleep history. Clearly the patient falls asleep \"everywhere\". And sometimes catnaps on and off all day and does not make it to the bed at night. When he does make it to the bed he was sleep 7 to 9 hours and then feels somewhat more rested  There somewhat pending evaluation with neurology as his wife says that he is having memory \"problems\". They are discussing different options to evaluate his sleep it was clear that an in lab PSG would be required as his wife thinks it would be too difficult for him to complete a home study and he would not be inclined to follow through. She also wishes to stay with him as she is concerned he may wake up and feel disoriented during the study. I will order his PSG and have them follow-up in the office afterwards. Plan:  Schedule patient for sleep study for further evaluation. Potential consequences of untreated sleep apnea, and/or excessive daytime sleepiness were discussed with the patient. Educational materials provided. Treatment options including CPAP, dental appliance, weight reduction measures, positional therapy, surgeries etc were at least briefly discussed. Healthy lifestyle changes to include weight loss and exercise discussed.   Healthy sleep habits were reviewed and encouraged.  and workplace safety reviewed and discussed as appropriate. Drowsy and/or inattentive driving should be avoided. Follow up with Primary Care Provider (PCP) as directed and for routine health care maintenance. Follow-up: 1-2 months (after sleep study complete), sooner should new symptoms or problems arise. Thank you for allowing me to participate in the care of your patient! Echo Puente DO, FACS  Sleep and Medicine        History of Present Illness: Mr. Jackelyn Olson. is a 75 y.o. male patient who was referred relation of sleep. The history was provided by the patient and his wife. In actuality that his wife did most of the talking as the patient appeared to not want to discuss a lot of issues about his sleep and claimed he was not that bothered but he did clearly state that he does feel sleepy on and off all day and does not want to feel like this. Patient's wife states that he catnaps on and off all day long and will sleep for half an hour to an hour at a time in his recliner and then a lot of days does not make it to the bed at night and does not have a set bedtime or wake time. Some nights she wakes him up on the recliner between 2 or 3 in the morning and has some go to bed which she sleeps on his side with 2 pillows. He snores regardless of what position he is in but she does not claim he stops breathing but sort of \"sighs\" while breathing at night sometimes. He will get up for the day then at 11 if he does not have to be anywhere and feels somewhat more refreshed but then still will sit down and Nap on and off all day. This is been the apparent pattern for the last 5 or 6 years at least.  Patient's wife also states that he has memory issues and they have a pending neurology evaluation for this. She also stated that in December he \"passed out\" of which the etiology is unknown.   He does have a history of high blood pressure and some back and neck pain. When asked about restless legs he does state that his legs do bother him at night and keep him awake on and off. His wife also said that he was told he had obstructive sleep apnea at the [address] but the patient and his wife are unsure if they ever had a sleep study done there and those records not available. History of HTN, CVA, longstanding tobacco abuse, spinal and back surgery, appy, ascending aorta dilatation    Occupation: N/a                Work Schedule: retired  Driving:  yes but rarely - wife does almost all of the driving  Drowsy Driving: is not reported. Motor vehicle accident(s) associated with drowsy driving have not occurred. Snoring:is a problem. This is a Chronic problem which has been ongoing for years and is described as \"can be loud\". Witnessed apneas are not reported. Daytime Somnolence: is a problem (see [place] score below). This is a Chronic problem which has been ongoing for years. Dental: Teeth clenching or grinding at night while sleeping is not reported. Naps: are reported. He takes approximately many naps daily. Naps from 30-60 minutes and does not know find nap(s) refreshing. Vivid dreams do notoccur with naps. Leg Symptoms: He does have unpleasant or crawling sensation in legs or strong urge to move when inactive. Pain: Pain typically does disturb their sleep on most days. GERD: is not reported during the night. Mood: The patient describes their mood as: irritable. Sleep-Wake History : See HPI    Reports sleeping in a Bed or recliner. If in bed,uses 2 pillows under their head. Estimates sleeping approximately \"many\" hours per night/day. Usually awakens spontaneously, various times. Workdays and weekends are not substantially different in terms of sleep/wake times. Patient does have a bed partner currently. Vivid dreams are not reported. Waking up from sleep with a headache is not reported. Awakening with a dry mouth is not reported.   Symptom(s) suggestive of cataplexy are not reported. Sleep paralysis is not reported. Sleep onset/waking hallucinations: are not reported. Sleep walking is not reported. Sleep talking is reported. Enuresis is not reported. Acting out dreams violently is not reported. \"Clock-watching\" at night is not reported. Other unusual and/or parasomnia behaviors is not reported. Family Sleep History (if available/pertinent):    Deya Person [place] 1/10/2023   Does the patient snore loudly (louder than talking or loud enough to be heard through closed doors)? 1   Does the patient often feel tired, fatigued, or sleepy during the daytime, even after a \"good\" night's sleep? 1   Has anyone ever observed the patient stop breathing during their sleep?  0   Does the patient have or are they being treated for high blood pressure? 0   Is the patient's BMI greater than 35? 0   Is your neck circumference greater than 17 inches (Male) or 16 inches (Female)? 0   Is the patient older than 48? 1   Is the patient male? 1   ROBINSON Score 4       3 most recent PHQ Screens 1/10/2023   Little interest or pleasure in doing things Not at all   Feeling down, depressed, irritable, or hopeless Not at all   Total Score PHQ 2 0        [place] Sleepiness Score: 18   which reflects severe daytime drowsiness. Immunization History: There is no immunization history on file for this patient. Past Medical History:  Past Medical History:   Diagnosis Date    Neuropathy        Past Surgical History:  Past Surgical History:   Procedure Laterality Date    HX APPENDECTOMY      HX CERVICAL FUSION  2012    [facility]  2011    Dr. Candice Goldman       Family History:  No family history on file.     Social History:  Social History     Tobacco Use    Smoking status: Every Day     Packs/day: 0.50     Years: 50.00     Pack years: 25.00     Types: Cigarettes    Smokeless tobacco: Never   Substance Use Topics    Alcohol use: Not Currently     Alcohol/week: 1.0 standard drink     Types: 1 Cans of beer per week    Drug use: No        Caffeine Amount Time of last Intake Comments   Coffee 0     Soda 0     Tea 0     Energy Drinks 0     Over- the - counter stimulant pills 0     Other Substances      Alcohol 0     Tobacco 1/2 ppd for \"many years\"     Drugs 0     Other: 0         Medications:  Current Outpatient Medications on File Prior to Visit   Medication Sig Dispense Refill    triamcinolone acetonide (KENALOG) 0.025 % topical cream APPLY A THIN LAYER TO AFFECTED AREA TWICE A DAY 15 g 0    atorvastatin (LIPITOR) 10 mg tablet TAKE 1 TABLET BY MOUTH EVERY DAY 90 Tablet 0    cetirizine (ZYRTEC) 10 mg tablet Take 1 Tablet by mouth daily. 90 Tablet 0    albuterol (PROVENTIL HFA, VENTOLIN HFA, PROAIR HFA) 90 mcg/actuation inhaler Take 2 Puffs by inhalation every six (6) hours as needed for Shortness of Breath. 18 g 2    lisinopriL (PRINIVIL, ZESTRIL) 10 mg tablet Take 1 Tablet by mouth daily. 90 Tablet 1    methocarbamoL (Robaxin) 500 mg tablet Take 1 Tablet by mouth two (2) times daily as needed for Muscle Spasm(s). Take 1 tab po BID as needed for pain/spasms 180 Tablet 1    fluticasone propionate (FLOVENT HFA) 110 mcg/actuation inhaler Take 1 Puff by inhalation every twelve (12) hours. 12 g 1     No current facility-administered medications on file prior to visit. Allergy:  No Known Allergies    Review of Systems:  Otherwise negative and per HPI (see HPI summary)  General: Negative for fatigue, malaise, chills, fever, hot flashes and night sweats. ENT: Negative for headaches, epistaxis, nasal congestion, nasal discharge, nasal polys, oral lesions sinus pain/infections, tinnitus, vertigo, hearing loss.   Respiratory: Negative for cough, sneezing, shortness of breath or wheezing  Cardiovascular: Negative for chest pain, dyspnea on exertion, no palpitations,no known heart murmur  Musculoskeletal: Negative for joint pain, limb weakness, no arthralgias, no swelling in extremities, no neck pain, no osteoporosis  Neurological: No TIA or stroke symptoms, no weakness, no numbness, no seizures, no dizziness, no tremors, no gait dysfunction, no paralysis, no headache  Psychological: Negative for mood lability      Physical Exam:  Blood pressure (!) 143/70, pulse 82, temperature 97.1 °F (36.2 °C), temperature source Temporal, resp. rate 18, height 5' 10\" (1.778 m), weight 81.2 kg (179 lb), SpO2 99 %. on room air, Body mass index is 25.68 kg/m². Neck circ. in \"inches\": 15    General: No distress, acyanotic, appears stated age  [de-identified]: PERRL, EOMI, OC/OP: Tongue without lesions, Mallampati score 3, Uvula midline,Tonsils absent, FTP IIb,class I occlusion  Neck: Supple, no lymphadenopathy, thyroid is not enlarged/no nodules noted   Lungs: Clear to auscultation bilaterally  Heart: Regular rate and rhythm, S1/S2 present, without murmur. Extremity: Negative for cyanosis, edema, or clubbing. Skin: Skin color, texture, turgor normal. No rashes or lesions.   Neurological: CN 2-12 grossly intact, normal muscle tone, no focal deficits - uses cane    Data Reviewed:  CBC:   Lab Results   Component Value Date/Time    WBC 11.3 12/20/2022 03:57 PM    HGB 13.0 12/20/2022 03:57 PM    HCT 40.1 12/20/2022 03:57 PM    PLATELET 068 30/48/5163 03:57 PM    MCV 86.4 12/20/2022 03:57 PM       BMP:   Lab Results   Component Value Date/Time    Sodium 139 12/20/2022 03:57 PM    Potassium 4.7 12/20/2022 03:57 PM    Chloride 103 12/20/2022 03:57 PM    CO2 29 12/20/2022 03:57 PM    Anion gap 7 12/20/2022 03:57 PM    Glucose 107 (H) 12/20/2022 03:57 PM    BUN 24 (H) 12/20/2022 03:57 PM    Creatinine 1.11 12/20/2022 03:57 PM    BUN/Creatinine ratio 22 (H) 12/20/2022 03:57 PM    GFR est AA >60 09/26/2022 11:06 AM    GFR est non-AA >60 09/26/2022 11:06 AM    Calcium 10.0 12/20/2022 03:57 PM        Historical Sleep Testing Data: None available for review    Khalif Fair DO, FACS  Sleep and Medicine

## 2023-01-10 NOTE — PROGRESS NOTES
Sylwia Gilliland. presents today for   Chief Complaint   Patient presents with    Sleep Problem    Snoring    Shortness of Breath    New Patient       Is someone accompanying this pt? no    Is the patient using any DME equipment during OV? no    -D[employer] Company     Have you ever had a sleep study done before? No    Depression Screening:  3 most recent PHQ Screens 1/10/2023   Little interest or pleasure in doing things Not at all   Feeling down, depressed, irritable, or hopeless Not at all   Total Score PHQ 2 0       [place] Sleepiness Scale:  [place] Sleepiness Scale  1/10/2023   Sitting and Reading 1   Watching TV 3   Sitting, inactive in a public place (e.g. a movie theater or meeting) 3   As a passenger in a car for an hour, without a break 3   Lying down to rest in the afternoon, when circumstances permit 3   Sitting and talking to someone 2   Sitting quietly after lunch without alcohol 3   In a car, while stopped for a few minutes in traffic 0   [place] Sleepiness Score 18       Stop-Bang:  Stop Ivet Reavesi 1/10/2023   Does the patient snore loudly (louder than talking or loud enough to be heard through closed doors)? 1   Does the patient often feel tired, fatigued, or sleepy during the daytime, even after a \"good\" night's sleep? 1   Has anyone ever observed the patient stop breathing during their sleep?  0   Does the patient have or are they being treated for high blood pressure? 0   Is the patient's BMI greater than 35? 0   Is your neck circumference greater than 17 inches (Male) or 16 inches (Female)? 0   Is the patient older than 48? 1   Is the patient male? 1   ROBINSON Score 4       Neck Circumference: 15 inches         Coordination of Care:  1. Have you been to the ER, urgent care clinic since your last visit? Hospitalized since your last visit? No    2. Have you seen or consulted any other health care providers outside of the [address] [place] [address] since your last visit? Include any pap smears or colon screening. no    Medication list has been updated according to patient.

## 2023-01-11 ENCOUNTER — HOSPITAL ENCOUNTER (OUTPATIENT)
Dept: CT IMAGING | Age: 68
Discharge: HOME | End: 2023-01-11
Attending: STUDENT IN AN ORGANIZED HEALTH CARE EDUCATION/TRAINING PROGRAM
Payer: MEDICARE

## 2023-01-11 DIAGNOSIS — I77.810 ASCENDING AORTA DILATATION (HCC): ICD-10-CM

## 2023-01-11 PROCEDURE — 74011000636 HC RX REV CODE- 636: Performed by: STUDENT IN AN ORGANIZED HEALTH CARE EDUCATION/TRAINING PROGRAM

## 2023-01-11 PROCEDURE — 74175 CTA ABDOMEN W/CONTRAST: CPT

## 2023-01-11 RX ADMIN — IOPAMIDOL 80 ML: 755 INJECTION, SOLUTION INTRAVENOUS at 09:51

## 2023-01-16 DIAGNOSIS — I70.0 ATHEROSCLEROSIS OF ABDOMINAL AORTA (HCC): Primary | ICD-10-CM

## 2023-01-16 RX ORDER — ASPIRIN 81 MG/1
81 TABLET ORAL DAILY
Qty: 90 TABLET | Refills: 3 | Status: SHIPPED | OUTPATIENT
Start: 2023-01-16

## 2023-02-14 DIAGNOSIS — R41.3 MEMORY CHANGES: Primary | ICD-10-CM

## 2023-02-27 ENCOUNTER — OFFICE VISIT (OUTPATIENT)
Age: 68
End: 2023-02-27
Payer: COMMERCIAL

## 2023-02-27 VITALS
BODY MASS INDEX: 25.65 KG/M2 | RESPIRATION RATE: 20 BRPM | SYSTOLIC BLOOD PRESSURE: 124 MMHG | OXYGEN SATURATION: 94 % | DIASTOLIC BLOOD PRESSURE: 82 MMHG | HEIGHT: 70 IN | HEART RATE: 83 BPM | WEIGHT: 179.2 LBS

## 2023-02-27 DIAGNOSIS — R41.3 MEMORY CHANGES: Primary | ICD-10-CM

## 2023-02-27 DIAGNOSIS — H02.403 PTOSIS OF BOTH EYELIDS: ICD-10-CM

## 2023-02-27 PROCEDURE — 99204 OFFICE O/P NEW MOD 45 MIN: CPT | Performed by: NURSE PRACTITIONER

## 2023-02-27 PROCEDURE — 1123F ACP DISCUSS/DSCN MKR DOCD: CPT | Performed by: NURSE PRACTITIONER

## 2023-02-27 NOTE — PROGRESS NOTES
[address] [address]. [facility], [address]  Office:  [phone]  Fax: [phone]    Referring: Viraj Garnica DO      Chief Complaint   Patient presents with    Memory Loss       HPI:  Alexis Duffy. presents in new patient evaluation for memory changes. He has history to include hypertension, chronic pain, history of lumbar fusion in 2011 and cervical fusion in 2012 due to spinal stenosis, tobacco abuse, and PTSD. Had a visit with his primary care physician in September 2022 was noted that he had memory concerns. Noted to have started around 3 years ago but getting worse this last year. Forgets to take medications. Hx of PTSD. Wife admits to agitation, finds himself pondering a lot trying to remember what he was going to say, others have noticed as well. He also nods off a lot. He saw a sleep specialist Dr. Tess Guadarrama in January 2023 for snoring, daytime somnolence, RLS, poor sleep, and was noted to have probable MARIA LUZ. He was scheduled for sleep study. He presents today in evaluation. He is alone. He does not really have memory concerns. His wife has concern about memory problems. He reports leg problems, broke his knee in the service. He is set up for a sleep study. He likes watching sports- football, basketball, and baseball. Denies problems with driving. He admits to problems taking meds- he rushed out this AM and didn't take meds. No problem with ADLs. She does most of the finances. He reports he's slacked off on it in the past and has had problems. She cooks, has always been the one to cook. Endorses she has brought up concerns with his memory. He says sometimes doesn't take things as seriously as he should, such as looking at the info for this appointment ahead of time. Says he has mood swings. Does not see psychiatry or therapy, says he does not need it.   Of note, records indicate he had been referred to psychiatry for chronic PTSD for CBT by his PCP. Says he gets agitated a lot with certain things. Gets frustrated. No safety issues. No hallucinations. Reports poor sleep habits. Denies history of stroke or seizures, LOC spell, or head injury. No tremor. Sometimes off balance. No falls. No incontinence. Denies fever or chills. Reports his eating habits are bad. Drinks Boost.  Denies weight loss or swallowing issues. Wears glasses, has new ones from recent eye exam.  Noted to have drooping eyelids during the appointment as if he is drowsy or nodding off, but he denies this, says he is not sleepy. He takes methocarbamol for chronic pain. He was in PT last fall for his pain and weakness of the bilateral lower extremities. Social history: Retired, was a . 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  Lives with wife. Has 6 kids total, 19 grandkids. All kids local except 2. No alcohol use. Smokes cigarettes, about a pack every 2-3 days, trying to quit. Denies drug use. Family history: Mother is 80. Mom has memory problems coming on. Father  when patient was 35. Sister is 3 years younger, had breast cancer. Other sister had spine disorder at birth, had breast cancer. Brother had hip replacement. Maternal grandparents- he  when she was young.       Social History     Socioeconomic History    Marital status:      Spouse name: Not on file    Number of children: Not on file    Years of education: Not on file    Highest education level: Not on file   Occupational History    Not on file   Tobacco Use    Smoking status: Every Day     Packs/day: 0.50     Types: Cigarettes    Smokeless tobacco: Never   Substance and Sexual Activity    Alcohol use: Not Currently     Alcohol/week: 1.0 standard drink    Drug use: No    Sexual activity: Not on file   Other Topics Concern    Not on file   Social History Narrative    Not on file     Social Determinants of Health     Financial Resource Strain: Not on file   Food Insecurity: Not on file   Transportation Needs: Not on file   Physical Activity: Not on file   Stress: Not on file   Social Connections: Not on file   Intimate Partner Violence: Not on file   Housing Stability: Not on file       No family history on file. Current Outpatient Medications   Medication Sig Dispense Refill    albuterol sulfate HFA (PROVENTIL;VENTOLIN;PROAIR) 108 (90 Base) MCG/ACT inhaler Inhale 2 puffs into the lungs every 6 hours as needed      atorvastatin (LIPITOR) 10 MG tablet TAKE 1 TABLET BY MOUTH EVERY DAY      cetirizine (ZYRTEC) 10 MG tablet Take 10 mg by mouth daily      fluticasone (FLOVENT HFA) 110 MCG/ACT inhaler Inhale 1 puff into the lungs every 12 hours      lisinopril (PRINIVIL;ZESTRIL) 10 MG tablet Take 10 mg by mouth daily      methocarbamol (ROBAXIN) 500 MG tablet Take 500 mg by mouth 2 times daily as needed      triamcinolone (KENALOG) 0.025 % cream Apply topically 2 times daily       No current facility-administered medications for this visit. Past Medical History:   Diagnosis Date    Neuropathy        Past Surgical History:   Procedure Laterality Date    APPENDECTOMY      CERVICAL FUSION  2012    [facility]  2011    Dr. Zeyad Curtis       No Known Allergies    There is no problem list on file for this patient. Review of Systems:   Constitutional: no fever or chills. +occasional fatigue- says if he does not get enough rest.   Skin denies rash or itching  HEENT:  Denies tinnitus, hearing loss, or visual changes  Respiratory: denies shortness of breath  Cardiovascular: denies chest pain, dyspnea on exertion  Gastrointestinal: does not report nausea or vomiting  Genitourinary: does not report dysuria or incontinence  Musculoskeletal: does not report joint pain or swelling. +legs pain.     Endocrine: denies weight change  Hematology: denies easy bruising or bleeding   Neurological: as above in HPI      PHYSICAL EXAMINATION:      VITAL SIGNS:  /82 (Site: Left Upper Arm, Position: Sitting, Cuff Size: Large Adult)   Pulse 83   Resp 20   Ht 5' 10\" (1.778 m)   Wt 179 lb 3.2 oz (81.3 kg)   SpO2 94%   BMI 25.71 kg/m²     GENERAL: Well developed, well nourished, in no apparent distress. HEART: RR.  LUNGS:                      Respirations regular and unlabored. EXTREMITIES: No clubbing, cyanosis, or edema is identified. Pulses 2+    and symmetrical.  HEAD:   Normocephalic, atraumatic. NEUROLOGIC EXAMINATION    MENTAL STATUS: Awake, alerts to voice but drowsy and closing eyes in the visit or eyelids drooping, and oriented x 4. Attention and STM are grossly normal. There is no aphasia. Fund of knowledge is adequate. Mood and affect are appropriate. No problems providing medical history. He scored 30 out of 30 on the MMSE. Clock drawing intact. CRANIAL NERVES: Visual fields are full to confrontation. Pupils are reactive to light and accommodation. Extraocular movements are intact and there is no nystagmus. Bilateral ?ptosis but he denies being sleepy. Facial sensation is normal.  Face is symmetrical.   Hearing is grossly intact. SCM/TPZ 5/5. Palate rises symmetrically. Tongue is in the midline. MOTOR:  Normal tone, bulk, and strength, 5/5 muscle strength throughout except 4/5 bilateral triceps, and 4+/5 bilateral IP. No cogwheel rigidity or clonus present. No drift of the bilateral upper extremities. Fine finger movements symmetrical.    CEREBELLAR: Finger to nose was normal.   No tremors or dysmetria. SENSORY: LT and vibration intact. PP decreased in the distal BLE. DTRs: Decreased throughout, toes downgoing. Thompson negative bilaterally. GAIT:   Ambulates with a mild limp, steady, without assistive device. Can heel walk and toe walk. Difficulty with tandem walking.       CBC:   Lab Results   Component Value Date/Time    WBC 11.3 12/20/2022 03:57 PM    RBC 4.64 12/20/2022 03:57 PM    HGB 13.0 12/20/2022 03:57 PM    HCT 40.1 12/20/2022 03:57 PM    MCV 86.4 12/20/2022 03:57 PM    RDW 14.4 12/20/2022 03:57 PM     12/20/2022 03:57 PM     BMP:    Lab Results   Component Value Date/Time     12/20/2022 03:57 PM    K 4.7 12/20/2022 03:57 PM     12/20/2022 03:57 PM    CO2 29 12/20/2022 03:57 PM    BUN 24 12/20/2022 03:57 PM     HFP:    Lab Results   Component Value Date/Time    PROT 8.4 12/20/2022 03:57 PM     CMP:    Lab Results   Component Value Date/Time     12/20/2022 03:57 PM    K 4.7 12/20/2022 03:57 PM     12/20/2022 03:57 PM    CO2 29 12/20/2022 03:57 PM    BUN 24 12/20/2022 03:57 PM    PROT 8.4 12/20/2022 03:57 PM     FLP:    Lab Results   Component Value Date/Time    CHOL 147 09/26/2022 11:06 AM    TRIG 210 09/26/2022 11:06 AM    HDL 43 09/26/2022 11:06 AM         Impression/Plan: This is a 68-year-old right-handed male who presents in new patient evaluation for memory concerns. He does not note much concern with his memory but reports that his wife has concerns. For instance he has issues with remembering to take his medications. In terms of behavior he reports some mood swings, agitation, gets frustrated about things. Denies safety issues. He does not want to see psychiatry or therapy at this time. He denies fatigue today but has had fatigue noted and he is undergoing work-up with sleep specialist and will have a sleep study. We will obtain an imaging study with MRI of the brain. He would prefer an open MRI. Feels too claustrophobic in MRIs and would rather not try even with a sedative. We will obtain labs to check vitamin B12 level, TSH, and we will check a myasthenia panel due to eyelid drooping. He scored 30 out of 30 on the MMSE and no major cognitive concerns on exam noted today. Fortunately he is undergoing work-up to rule out sleep disorder and he was encouraged to continue with this.   Discussed the consideration for neuropsychological evaluation for closer evaluation. He is interested in proceeding. Encouraged to stay engaged in mentally stimulating activities, reduce and try to quit smoking, and healthy diet. Follow up in 3 months. Paula was seen today for memory loss. Diagnoses and all orders for this visit:    Memory changes  -     MRI BRAIN WO CONTRAST; Future  -     TSH + Free T4 Panel; Future  -     Vitamin B12 & Folate; Future  -     External Referral To Neuropsychology    Ptosis of both eyelids  -     Myasthenia gravis panel; Future      I spent 55 minutes with the patient in face-to-face consultation, with 30 minutes spent in counseling and coordination of care as described above. Signed By: HUBERT Andrade NP              PLEASE NOTE:   Portions of this document may have been produced using voice recognition software. Unrecognized errors in transcription may be present.

## 2023-02-27 NOTE — PATIENT INSTRUCTIONS
Patient instructions:  -MRI brain- Open MRI at [address]  [phone]  -please have labs obtained- lab at [facility], or [place]  -neuropsychology evaluation- [address]  [phone]  -continue with sleep evaluation  -follow up in 3 months

## 2023-02-28 DIAGNOSIS — R40.0 DAYTIME SOMNOLENCE: ICD-10-CM

## 2023-02-28 DIAGNOSIS — G25.81 RESTLESS LEG SYNDROME: ICD-10-CM

## 2023-02-28 DIAGNOSIS — R06.83 SNORING: Primary | ICD-10-CM

## 2023-02-28 DIAGNOSIS — R29.818 SUSPECTED SLEEP APNEA: ICD-10-CM

## 2023-03-01 ENCOUNTER — TELEPHONE (OUTPATIENT)
Dept: SLEEP MEDICINE | Facility: HOSPITAL | Age: 68
End: 2023-03-01

## 2023-03-01 NOTE — TELEPHONE ENCOUNTER
Called 2x, never spoken to PT, only wife.  Informed wife we are trying to call and schedule, she states she will tell him to call, will notify physician

## 2023-03-03 DIAGNOSIS — R41.3 MEMORY CHANGES: Primary | ICD-10-CM

## 2023-03-07 ENCOUNTER — HOSPITAL ENCOUNTER (OUTPATIENT)
Facility: HOSPITAL | Age: 68
Setting detail: SPECIMEN
Discharge: HOME | End: 2023-03-10
Payer: COMMERCIAL

## 2023-03-07 ENCOUNTER — OFFICE VISIT (OUTPATIENT)
Facility: CLINIC | Age: 68
End: 2023-03-07
Payer: COMMERCIAL

## 2023-03-07 VITALS
BODY MASS INDEX: 25.91 KG/M2 | HEIGHT: 70 IN | RESPIRATION RATE: 18 BRPM | DIASTOLIC BLOOD PRESSURE: 76 MMHG | SYSTOLIC BLOOD PRESSURE: 129 MMHG | TEMPERATURE: 96.9 F | HEART RATE: 77 BPM | WEIGHT: 181 LBS | OXYGEN SATURATION: 99 %

## 2023-03-07 DIAGNOSIS — I10 ESSENTIAL (PRIMARY) HYPERTENSION: Primary | ICD-10-CM

## 2023-03-07 DIAGNOSIS — E78.2 MIXED HYPERLIPIDEMIA: ICD-10-CM

## 2023-03-07 DIAGNOSIS — Z71.6 TOBACCO ABUSE COUNSELING: ICD-10-CM

## 2023-03-07 DIAGNOSIS — E11.49 TYPE 2 DIABETES MELLITUS WITH OTHER DIABETIC NEUROLOGICAL COMPLICATION (HCC): ICD-10-CM

## 2023-03-07 DIAGNOSIS — G56.03 BILATERAL CARPAL TUNNEL SYNDROME: ICD-10-CM

## 2023-03-07 DIAGNOSIS — R06.83 SNORING: ICD-10-CM

## 2023-03-07 DIAGNOSIS — F17.200 TOBACCO USE DISORDER: ICD-10-CM

## 2023-03-07 DIAGNOSIS — R41.3 MEMORY LOSS: ICD-10-CM

## 2023-03-07 DIAGNOSIS — M79.672 LEFT FOOT PAIN: ICD-10-CM

## 2023-03-07 LAB
ALBUMIN SERPL-MCNC: 3.7 G/DL (ref 3.4–5)
ALBUMIN/GLOB SERPL: 1 (ref 0.8–1.7)
ALP SERPL-CCNC: 94 U/L (ref 45–117)
ALT SERPL-CCNC: 41 U/L (ref 16–61)
ANION GAP SERPL CALC-SCNC: 5 MMOL/L (ref 3–18)
AST SERPL-CCNC: 26 U/L (ref 10–38)
BILIRUB SERPL-MCNC: 0.2 MG/DL (ref 0.2–1)
BUN SERPL-MCNC: 24 MG/DL (ref 7–18)
BUN/CREAT SERPL: 24 (ref 12–20)
CALCIUM SERPL-MCNC: 9.4 MG/DL (ref 8.5–10.1)
CHLORIDE SERPL-SCNC: 104 MMOL/L (ref 100–111)
CO2 SERPL-SCNC: 26 MMOL/L (ref 21–32)
CREAT SERPL-MCNC: 0.98 MG/DL (ref 0.6–1.3)
CREAT UR-MCNC: 146 MG/DL (ref 30–125)
EST. AVERAGE GLUCOSE BLD GHB EST-MCNC: 114 MG/DL
GLOBULIN SER CALC-MCNC: 3.8 G/DL (ref 2–4)
GLUCOSE SERPL-MCNC: 95 MG/DL (ref 74–99)
HBA1C MFR BLD: 5.6 % (ref 4.2–5.6)
MICROALBUMIN UR-MCNC: 2.42 MG/DL (ref 0–3)
MICROALBUMIN/CREAT UR-RTO: 17 MG/G (ref 0–30)
POTASSIUM SERPL-SCNC: 4.1 MMOL/L (ref 3.5–5.5)
PROT SERPL-MCNC: 7.5 G/DL (ref 6.4–8.2)
SODIUM SERPL-SCNC: 135 MMOL/L (ref 136–145)

## 2023-03-07 PROCEDURE — 3074F SYST BP LT 130 MM HG: CPT | Performed by: STUDENT IN AN ORGANIZED HEALTH CARE EDUCATION/TRAINING PROGRAM

## 2023-03-07 PROCEDURE — 2022F DILAT RTA XM EVC RTNOPTHY: CPT | Performed by: STUDENT IN AN ORGANIZED HEALTH CARE EDUCATION/TRAINING PROGRAM

## 2023-03-07 PROCEDURE — 4004F PT TOBACCO SCREEN RCVD TLK: CPT | Performed by: STUDENT IN AN ORGANIZED HEALTH CARE EDUCATION/TRAINING PROGRAM

## 2023-03-07 PROCEDURE — 99214 OFFICE O/P EST MOD 30 MIN: CPT | Performed by: STUDENT IN AN ORGANIZED HEALTH CARE EDUCATION/TRAINING PROGRAM

## 2023-03-07 PROCEDURE — 3046F HEMOGLOBIN A1C LEVEL >9.0%: CPT | Performed by: STUDENT IN AN ORGANIZED HEALTH CARE EDUCATION/TRAINING PROGRAM

## 2023-03-07 PROCEDURE — 3017F COLORECTAL CA SCREEN DOC REV: CPT | Performed by: STUDENT IN AN ORGANIZED HEALTH CARE EDUCATION/TRAINING PROGRAM

## 2023-03-07 PROCEDURE — 36415 COLL VENOUS BLD VENIPUNCTURE: CPT

## 2023-03-07 PROCEDURE — 1123F ACP DISCUSS/DSCN MKR DOCD: CPT | Performed by: STUDENT IN AN ORGANIZED HEALTH CARE EDUCATION/TRAINING PROGRAM

## 2023-03-07 PROCEDURE — 82570 ASSAY OF URINE CREATININE: CPT

## 2023-03-07 PROCEDURE — G8484 FLU IMMUNIZE NO ADMIN: HCPCS | Performed by: STUDENT IN AN ORGANIZED HEALTH CARE EDUCATION/TRAINING PROGRAM

## 2023-03-07 PROCEDURE — 83036 HEMOGLOBIN GLYCOSYLATED A1C: CPT

## 2023-03-07 PROCEDURE — 80053 COMPREHEN METABOLIC PANEL: CPT

## 2023-03-07 PROCEDURE — G8427 DOCREV CUR MEDS BY ELIG CLIN: HCPCS | Performed by: STUDENT IN AN ORGANIZED HEALTH CARE EDUCATION/TRAINING PROGRAM

## 2023-03-07 PROCEDURE — G8417 CALC BMI ABV UP PARAM F/U: HCPCS | Performed by: STUDENT IN AN ORGANIZED HEALTH CARE EDUCATION/TRAINING PROGRAM

## 2023-03-07 PROCEDURE — 3078F DIAST BP <80 MM HG: CPT | Performed by: STUDENT IN AN ORGANIZED HEALTH CARE EDUCATION/TRAINING PROGRAM

## 2023-03-07 PROCEDURE — 99406 BEHAV CHNG SMOKING 3-10 MIN: CPT | Performed by: STUDENT IN AN ORGANIZED HEALTH CARE EDUCATION/TRAINING PROGRAM

## 2023-03-07 SDOH — ECONOMIC STABILITY: INCOME INSECURITY: HOW HARD IS IT FOR YOU TO PAY FOR THE VERY BASICS LIKE FOOD, HOUSING, MEDICAL CARE, AND HEATING?: VERY HARD

## 2023-03-07 SDOH — ECONOMIC STABILITY: HOUSING INSECURITY
IN THE LAST 12 MONTHS, WAS THERE A TIME WHEN YOU DID NOT HAVE A STEADY PLACE TO SLEEP OR SLEPT IN A SHELTER (INCLUDING NOW)?: NO

## 2023-03-07 SDOH — ECONOMIC STABILITY: FOOD INSECURITY: WITHIN THE PAST 12 MONTHS, THE FOOD YOU BOUGHT JUST DIDN'T LAST AND YOU DIDN'T HAVE MONEY TO GET MORE.: SOMETIMES TRUE

## 2023-03-07 SDOH — ECONOMIC STABILITY: FOOD INSECURITY: WITHIN THE PAST 12 MONTHS, YOU WORRIED THAT YOUR FOOD WOULD RUN OUT BEFORE YOU GOT MONEY TO BUY MORE.: SOMETIMES TRUE

## 2023-03-07 ASSESSMENT — PATIENT HEALTH QUESTIONNAIRE - PHQ9
1. LITTLE INTEREST OR PLEASURE IN DOING THINGS: 1
SUM OF ALL RESPONSES TO PHQ QUESTIONS 1-9: 2
2. FEELING DOWN, DEPRESSED OR HOPELESS: 1
SUM OF ALL RESPONSES TO PHQ QUESTIONS 1-9: 2
SUM OF ALL RESPONSES TO PHQ QUESTIONS 1-9: 2
SUM OF ALL RESPONSES TO PHQ9 QUESTIONS 1 & 2: 2
SUM OF ALL RESPONSES TO PHQ QUESTIONS 1-9: 2

## 2023-03-07 ASSESSMENT — ENCOUNTER SYMPTOMS
SHORTNESS OF BREATH: 0
ABDOMINAL PAIN: 0

## 2023-03-07 NOTE — PROGRESS NOTES
HISTORY OF PRESENT ILLNESS  Paula Willett. is a 75 y.o. male presenting today for   Chief Complaint   Patient presents with    Follow-up Chronic Condition        Memory loss- Saw neuro for dementia and MG work up. Pt has declined the MRI. He has yet to have labs done    Knee pain- Saw ortho and recommended HEP    HTN- Taking lisinopril daily. Not very physically active due to pain. Denies changes in vision hearing or headaches. T2D- Diet controlled  Hemoglobin A1C       Date                     Value               Ref Range           Status                09/26/2022               5.8 (H)             4.2 - 5.6 %         Final              Hemoglobin A1C, POC       Date                     Value               Ref Range           Status                12/20/2022               5.9                 %                   Final              HLD- Taking lipitor daily. Denies myalgias    Snoring- Had initial est visit with sleep medicine. Currently needs to sched sleep study    Foot pain- Chronic. Has upcoming appointment with podiatry on 3/22. Was cutting toe nails and trimming dead skin and cut his left big toe. Hand pain- Chronic. Hx of CTS. Has had xrays done by [place] In the past. Previously taking gabapentin  ----------         Review of Systems   Eyes:  Negative for visual disturbance. Respiratory:  Negative for shortness of breath. Cardiovascular:  Negative for chest pain. Gastrointestinal:  Negative for abdominal pain. Neurological:  Negative for headaches. /76   Pulse 77   Temp 96.9 °F (36.1 °C) (Skin)   Resp 18   Ht 5' 10\" (1.778 m)   Wt 181 lb (82.1 kg)   SpO2 99%   BMI 25.97 kg/m²     Physical Exam  HENT:      Mouth/Throat:      Comments: MASK  Eyes:      Pupils: Pupils are equal, round, and reactive to light. Cardiovascular:      Rate and Rhythm: Normal rate and regular rhythm. Pulmonary:      Effort: Pulmonary effort is normal.      Breath sounds: Normal breath sounds. Musculoskeletal:      Right lower leg: No edema. Left lower leg: No edema. Feet:      Left foot:      Protective Sensation: 6 sites tested. 6 sites sensed. Comments: Small cut on L great toe. No signs of infection. Psychiatric:         Mood and Affect: Mood normal.       ASSESSMENT and PLAN    ICD-10-CM    1. Essential (primary) hypertension  I10       2. Type 2 diabetes mellitus with other diabetic neurological complication (HCC)  N69.76 Hemoglobin A1C     Comprehensive Metabolic Panel     Microalbumin / Creatinine Urine Ratio      3. Left foot pain  M79.672       4. Bilateral carpal tunnel syndrome  G56.03 [address] DO, Hand Surgery, [place] ([facility])      5. Mixed hyperlipidemia  E78.2       6. Snoring  R06.83       7. Memory loss  R41.3       8. Tobacco use disorder  F17.200       9. Tobacco abuse counseling  Z71.6       BP well controlled on lisinopril, will continue. Discussed importance of limiting sodium in diet and getting 150min of exercise a day. T2D diet controlled    Referral to ortho for CTS    Memory loss managed by neuropsych    Counseled on tobacco cessation, NRT and harm reduction. Encouraged to participate in nicotine quit now program. Discussed at length for at least 5 min     Return in about 3 months (around 6/7/2023).

## 2023-03-08 NOTE — RESULT ENCOUNTER NOTE
Overall your labs look good. There is a mild decline in your sodium level most likely related to dehydration. We will recheck this at your next visit.

## 2023-03-15 ENCOUNTER — TELEPHONE (OUTPATIENT)
Facility: CLINIC | Age: 68
End: 2023-03-15

## 2023-03-16 DIAGNOSIS — E78.2 MIXED HYPERLIPIDEMIA: ICD-10-CM

## 2023-03-16 RX ORDER — ATORVASTATIN CALCIUM 10 MG/1
TABLET, FILM COATED ORAL
Qty: 90 TABLET | Refills: 0 | Status: SHIPPED | OUTPATIENT
Start: 2023-03-16

## 2023-03-29 DIAGNOSIS — G89.29 OTHER CHRONIC PAIN: ICD-10-CM

## 2023-03-30 RX ORDER — LISINOPRIL 10 MG/1
TABLET ORAL
Qty: 90 TABLET | Refills: 1 | Status: SHIPPED | OUTPATIENT
Start: 2023-03-30

## 2023-05-17 DIAGNOSIS — M48.02 SPINAL STENOSIS, CERVICAL REGION: ICD-10-CM

## 2023-05-18 RX ORDER — TIZANIDINE 2 MG/1
2 TABLET ORAL NIGHTLY PRN
Qty: 30 TABLET | Refills: 0 | Status: SHIPPED | OUTPATIENT
Start: 2023-05-18

## 2023-05-18 RX ORDER — METHOCARBAMOL 500 MG/1
500 TABLET, FILM COATED ORAL 2 TIMES DAILY PRN
Qty: 30 TABLET | Refills: 0 | OUTPATIENT
Start: 2023-05-18

## 2023-05-18 RX ORDER — BACLOFEN 10 MG/1
5 TABLET ORAL NIGHTLY PRN
Qty: 15 TABLET | Refills: 0 | OUTPATIENT
Start: 2023-05-18

## 2023-05-22 RX ORDER — METHOCARBAMOL 500 MG/1
TABLET, FILM COATED ORAL
Qty: 180 TABLET | Refills: 1 | OUTPATIENT
Start: 2023-05-22

## 2023-05-24 ENCOUNTER — OFFICE VISIT (OUTPATIENT)
Age: 68
End: 2023-05-24

## 2023-05-24 VITALS — WEIGHT: 185 LBS | HEIGHT: 70 IN | TEMPERATURE: 97.1 F | BODY MASS INDEX: 26.48 KG/M2

## 2023-05-24 DIAGNOSIS — M19.042 PRIMARY OSTEOARTHRITIS OF BOTH HANDS: Primary | ICD-10-CM

## 2023-05-24 DIAGNOSIS — M19.041 PRIMARY OSTEOARTHRITIS OF BOTH HANDS: Primary | ICD-10-CM

## 2023-05-24 RX ORDER — MELOXICAM 15 MG/1
15 TABLET ORAL DAILY
Qty: 30 TABLET | Refills: 0 | Status: SHIPPED | OUTPATIENT
Start: 2023-05-24

## 2023-05-24 NOTE — PROGRESS NOTES
Cardiovascular:      Pulses: Normal pulses. Pulmonary:      Effort: Pulmonary effort is normal. No respiratory distress. Musculoskeletal:         General: Swelling and tenderness present. No deformity or signs of injury. Normal range of motion. Cervical back: Normal range of motion and neck supple. Right lower leg: No edema. Left lower leg: No edema. Skin:     General: Skin is warm and dry. Capillary Refill: Capillary refill takes less than 2 seconds. Findings: No bruising or erythema. Neurological:      General: No focal deficit present. Mental Status: He is alert and oriented to person, place, and time. Psychiatric:         Mood and Affect: Mood normal.         Behavior: Behavior normal.        Bilateral hands: There is tenderness to palpation about the MCP joints most notably the right ring finger. Range of motion is full. Grossly neurovascularly intact. Imagin2023 3 views of bilateral hands is significant for degenerative arthritis especially at the MCP joints but also the interphalangeal joints. Impression     Diagnosis Orders   1. Primary osteoarthritis of both hands  AMB POC XRAY, HAND; 3+ VIEWS    AMB POC XRAY, HAND; 3+ VIEWS    meloxicam (MOBIC) 15 MG tablet            Plan:     Meloxicam sent to pharmacy for bilateral hand degenerative arthritis. We discussed possibility of injections but he stated he did not want a shot. Return if symptoms worsen or fail to improve. Plan was reviewed with patient, who verbalized agreement and understanding of the plan    Note: This note was completed using voice recognition software.   Any typographical/name errors or mistakes are unintentional.

## 2023-05-30 ENCOUNTER — TELEPHONE (OUTPATIENT)
Facility: CLINIC | Age: 68
End: 2023-05-30

## 2023-06-08 ENCOUNTER — HOSPITAL ENCOUNTER (OUTPATIENT)
Facility: HOSPITAL | Age: 68
Setting detail: SPECIMEN
Discharge: HOME | End: 2023-06-08
Payer: MEDICARE

## 2023-06-08 ENCOUNTER — OFFICE VISIT (OUTPATIENT)
Facility: CLINIC | Age: 68
End: 2023-06-08
Payer: COMMERCIAL

## 2023-06-08 VITALS
BODY MASS INDEX: 25.77 KG/M2 | DIASTOLIC BLOOD PRESSURE: 72 MMHG | WEIGHT: 180 LBS | HEART RATE: 88 BPM | RESPIRATION RATE: 20 BRPM | OXYGEN SATURATION: 94 % | TEMPERATURE: 97 F | HEIGHT: 70 IN | SYSTOLIC BLOOD PRESSURE: 138 MMHG

## 2023-06-08 DIAGNOSIS — G89.29 OTHER CHRONIC PAIN: ICD-10-CM

## 2023-06-08 DIAGNOSIS — I10 ESSENTIAL (PRIMARY) HYPERTENSION: Primary | ICD-10-CM

## 2023-06-08 DIAGNOSIS — Z71.6 TOBACCO ABUSE COUNSELING: ICD-10-CM

## 2023-06-08 DIAGNOSIS — R10.84 GENERALIZED ABDOMINAL PAIN: ICD-10-CM

## 2023-06-08 DIAGNOSIS — E78.2 MIXED HYPERLIPIDEMIA: ICD-10-CM

## 2023-06-08 DIAGNOSIS — E87.1 HYPONATREMIA: ICD-10-CM

## 2023-06-08 DIAGNOSIS — F17.200 TOBACCO USE DISORDER: ICD-10-CM

## 2023-06-08 DIAGNOSIS — E11.49 TYPE 2 DIABETES MELLITUS WITH OTHER DIABETIC NEUROLOGICAL COMPLICATION (HCC): ICD-10-CM

## 2023-06-08 LAB
ANION GAP SERPL CALC-SCNC: 6 MMOL/L (ref 3–18)
BUN SERPL-MCNC: 23 MG/DL (ref 7–18)
BUN/CREAT SERPL: 25 (ref 12–20)
CALCIUM SERPL-MCNC: 9.8 MG/DL (ref 8.5–10.1)
CHLORIDE SERPL-SCNC: 104 MMOL/L (ref 100–111)
CO2 SERPL-SCNC: 27 MMOL/L (ref 21–32)
CREAT SERPL-MCNC: 0.93 MG/DL (ref 0.6–1.3)
GLUCOSE SERPL-MCNC: 81 MG/DL (ref 74–99)
POTASSIUM SERPL-SCNC: 4.6 MMOL/L (ref 3.5–5.5)
SODIUM SERPL-SCNC: 137 MMOL/L (ref 136–145)

## 2023-06-08 PROCEDURE — 3017F COLORECTAL CA SCREEN DOC REV: CPT | Performed by: STUDENT IN AN ORGANIZED HEALTH CARE EDUCATION/TRAINING PROGRAM

## 2023-06-08 PROCEDURE — 80048 BASIC METABOLIC PNL TOTAL CA: CPT

## 2023-06-08 PROCEDURE — G8417 CALC BMI ABV UP PARAM F/U: HCPCS | Performed by: STUDENT IN AN ORGANIZED HEALTH CARE EDUCATION/TRAINING PROGRAM

## 2023-06-08 PROCEDURE — 3078F DIAST BP <80 MM HG: CPT | Performed by: STUDENT IN AN ORGANIZED HEALTH CARE EDUCATION/TRAINING PROGRAM

## 2023-06-08 PROCEDURE — 4004F PT TOBACCO SCREEN RCVD TLK: CPT | Performed by: STUDENT IN AN ORGANIZED HEALTH CARE EDUCATION/TRAINING PROGRAM

## 2023-06-08 PROCEDURE — 3044F HG A1C LEVEL LT 7.0%: CPT | Performed by: STUDENT IN AN ORGANIZED HEALTH CARE EDUCATION/TRAINING PROGRAM

## 2023-06-08 PROCEDURE — 99214 OFFICE O/P EST MOD 30 MIN: CPT | Performed by: STUDENT IN AN ORGANIZED HEALTH CARE EDUCATION/TRAINING PROGRAM

## 2023-06-08 PROCEDURE — 2022F DILAT RTA XM EVC RTNOPTHY: CPT | Performed by: STUDENT IN AN ORGANIZED HEALTH CARE EDUCATION/TRAINING PROGRAM

## 2023-06-08 PROCEDURE — G8427 DOCREV CUR MEDS BY ELIG CLIN: HCPCS | Performed by: STUDENT IN AN ORGANIZED HEALTH CARE EDUCATION/TRAINING PROGRAM

## 2023-06-08 PROCEDURE — 1123F ACP DISCUSS/DSCN MKR DOCD: CPT | Performed by: STUDENT IN AN ORGANIZED HEALTH CARE EDUCATION/TRAINING PROGRAM

## 2023-06-08 PROCEDURE — 36415 COLL VENOUS BLD VENIPUNCTURE: CPT

## 2023-06-08 PROCEDURE — 3075F SYST BP GE 130 - 139MM HG: CPT | Performed by: STUDENT IN AN ORGANIZED HEALTH CARE EDUCATION/TRAINING PROGRAM

## 2023-06-08 RX ORDER — ATORVASTATIN CALCIUM 10 MG/1
10 TABLET, FILM COATED ORAL DAILY
Qty: 90 TABLET | Refills: 1 | Status: SHIPPED | OUTPATIENT
Start: 2023-06-08

## 2023-06-08 RX ORDER — LISINOPRIL 10 MG/1
10 TABLET ORAL DAILY
Qty: 90 TABLET | Refills: 1 | Status: SHIPPED | OUTPATIENT
Start: 2023-06-08

## 2023-06-08 ASSESSMENT — ENCOUNTER SYMPTOMS
ABDOMINAL PAIN: 0
SHORTNESS OF BREATH: 0

## 2023-06-08 ASSESSMENT — PATIENT HEALTH QUESTIONNAIRE - PHQ9
2. FEELING DOWN, DEPRESSED OR HOPELESS: 0
SUM OF ALL RESPONSES TO PHQ QUESTIONS 1-9: 0
1. LITTLE INTEREST OR PLEASURE IN DOING THINGS: 0
SUM OF ALL RESPONSES TO PHQ QUESTIONS 1-9: 0
SUM OF ALL RESPONSES TO PHQ9 QUESTIONS 1 & 2: 0
SUM OF ALL RESPONSES TO PHQ QUESTIONS 1-9: 0
SUM OF ALL RESPONSES TO PHQ QUESTIONS 1-9: 0

## 2023-06-08 NOTE — PROGRESS NOTES
Mini Colon. is a 70 y.o. male (: 1955) presenting to address:    Chief Complaint   Patient presents with    Follow-up Chronic Condition       BP (!) 140/65   Pulse 88   Temp 97 °F (36.1 °C) (Skin)   Resp 20   Ht 5' 10\" (1.778 m)   Wt 180 lb (81.6 kg)   SpO2 94%   BMI 25.83 kg/m²    AMB PAIN ASSESSMENT 2023   Location of Pain Hand   Location Modifiers Left;Right   Severity of Pain 7   Quality of Pain Aching   Duration of Pain Persistent   Frequency of Pain Constant   Limiting Behavior Yes   Result of Injury No       Hearing/Vision:   No results found. Learning Assessment:   No flowsheet data found. Depression Screening:   No flowsheet data found. Fall Risk Assessment:   No flowsheet data found. Abuse Screening:   No flowsheet data found. Coordination of Care Questionaire:     Advanced Directive:   1. Do you have an Advanced Directive? No    2. Would you like information on Advanced Directives? No    1. \"Have you been to the ER, urgent care clinic since your last visit? Hospitalized since your last visit? \" No    2. \"Have you seen or consulted any other health care providers outside of the [address] [place] [address] since your last visit? \" yes    3. For patients aged 39-70: Has the patient had a colonoscopy? Yes    If the patient is female:    4. For patients aged 41-77: Has the patient had a mammogram within the past 2 years? No    5. For patients aged 21-65: Has the patient had a pap smear?  No

## 2023-06-08 NOTE — PATIENT INSTRUCTIONS
[address] [facility]  Phone: [phone] [address] That Are Good Sources of Fiber  What foods are high in fiber? The foods you eat contain nutrients, such as vitamins and minerals. Fiber is a nutrient. Your body needs the right amount to stay healthy and work as it should. You can use the list below to help you make choices about which foods to eat. Here are some examples of foods that are good sources of fiber. Fruits  Apple  Apricot  Avocado  Banana  Blackberries  Cherries  Melon  Pear  Raspberries  Grains  Amaranth  Barley  Bran cereal  Farro  Oat bran  Oatmeal  Quinoa  Rice (brown or wild)  Whole-grain bread  Whole-grain English muffin  Protein foods  Almonds  Beans (black, kidney, navy, hopkins)  Jayro seeds  Garbanzo beans  Lentils  Pumpkin seeds  Split peas  Sunflower seeds  Vegetables  Artichoke  Broccoli  [place]roe sprouts  Cabbage  Carrots  Cauliflower  Eggplant  Green peas  Kale  Pumpkin  Sweet potato  White potato  Work with your doctor to find out how much of this nutrient you need. Depending on your health, you may need more or less of it in your diet. Where can you learn more? Go to http://www.woods.com/ and enter F355 to learn more about \"Learning About Foods That Are Good Sources of Fiber. \"  Current as of: May 9, 2022               Content Version: 13.5  © 3902-9831 [employer]wise, Incorporated. Care instructions adapted under license by [facility] ([facility]). If you have questions about a medical condition or this instruction, always ask your healthcare professional. [address] [place] [address] any warranty or liability for your use of this information.  [place], [place] · [phone]

## 2023-06-08 NOTE — PROGRESS NOTES
HISTORY OF PRESENT ILLNESS  Paula Robb. is a 70 y.o. male presenting today for   Chief Complaint   Patient presents with    Follow-up Chronic Condition      Hand pain- Est with ortho. Was Rx meloxicam and offered injections but declined this. He has noticed some improvement in this    HTN- Taking lisinopril 10mg daily. Not very physically active due to pain. Denies changes in vision hearing or headaches    T2D- Diet controlled. Had foot exam at last visit. Has yet to have eye exam. Would like to have some diabetic shoes  Hemoglobin A1C       Date                     Value               Ref Range           Status                03/07/2023               5.6                 4.2 - 5.6 %         Final    HLD- Taking lipitor daily. Denies myalgias    Abd pain- Occasion. Described as being crampy sensation and feels like he has to move his bowels occasional bloating. Denies loose stools or blood in stool       Review of Systems   Eyes:  Negative for visual disturbance. Respiratory:  Negative for shortness of breath. Cardiovascular:  Negative for chest pain. Gastrointestinal:  Negative for abdominal pain. Neurological:  Negative for headaches. /72   Pulse 88   Temp 97 °F (36.1 °C) (Skin)   Resp 20   Ht 5' 10\" (1.778 m)   Wt 180 lb (81.6 kg)   SpO2 94%   BMI 25.83 kg/m²     Physical Exam  HENT:      Mouth/Throat:      Comments: MASK  Eyes:      Pupils: Pupils are equal, round, and reactive to light. Cardiovascular:      Rate and Rhythm: Normal rate and regular rhythm. Pulmonary:      Effort: Pulmonary effort is normal.      Breath sounds: Normal breath sounds. Musculoskeletal:      Right lower leg: No edema. Left lower leg: No edema. Psychiatric:         Mood and Affect: Mood normal.       ASSESSMENT and PLAN    ICD-10-CM    1. Essential (primary) hypertension  I10 External Referral To Podiatry      2. Mixed hyperlipidemia  E78.2 atorvastatin (LIPITOR) 10 MG tablet      3.

## 2023-06-28 ENCOUNTER — TELEPHONE (OUTPATIENT)
Facility: CLINIC | Age: 68
End: 2023-06-28

## 2023-06-28 DIAGNOSIS — R10.9 ABDOMINAL PAIN, UNSPECIFIED ABDOMINAL LOCATION: Primary | ICD-10-CM

## 2023-06-28 NOTE — TELEPHONE ENCOUNTER
Patient's wife is requesting he have an referral to GI for stomach pains, nausea, and vomiting rozina submit to Dr Julienne Vargas at  [address] [facility], [address] [facility]. Phone+[phone].  Fax+[phone]

## 2023-06-30 ENCOUNTER — OFFICE VISIT (OUTPATIENT)
Age: 68
End: 2023-06-30

## 2023-06-30 VITALS — TEMPERATURE: 98.3 F | WEIGHT: 180 LBS | BODY MASS INDEX: 25.77 KG/M2 | HEIGHT: 70 IN

## 2023-06-30 DIAGNOSIS — M54.50 CHRONIC LOW BACK PAIN, UNSPECIFIED BACK PAIN LATERALITY, UNSPECIFIED WHETHER SCIATICA PRESENT: Primary | ICD-10-CM

## 2023-06-30 DIAGNOSIS — M17.11 PRIMARY OSTEOARTHRITIS OF RIGHT KNEE: ICD-10-CM

## 2023-06-30 DIAGNOSIS — M17.12 PRIMARY OSTEOARTHRITIS OF LEFT KNEE: ICD-10-CM

## 2023-06-30 DIAGNOSIS — G89.29 CHRONIC LOW BACK PAIN, UNSPECIFIED BACK PAIN LATERALITY, UNSPECIFIED WHETHER SCIATICA PRESENT: Primary | ICD-10-CM

## 2023-07-01 DIAGNOSIS — M19.042 PRIMARY OSTEOARTHRITIS OF BOTH HANDS: ICD-10-CM

## 2023-07-01 DIAGNOSIS — M19.041 PRIMARY OSTEOARTHRITIS OF BOTH HANDS: ICD-10-CM

## 2023-07-03 RX ORDER — MELOXICAM 15 MG/1
TABLET ORAL
Qty: 30 TABLET | Refills: 0 | Status: SHIPPED | OUTPATIENT
Start: 2023-07-03

## 2023-07-28 DIAGNOSIS — R06.00 DYSPNEA, UNSPECIFIED: ICD-10-CM

## 2023-07-28 RX ORDER — ALBUTEROL SULFATE 90 UG/1
AEROSOL, METERED RESPIRATORY (INHALATION)
Qty: 17 EACH | Refills: 2 | Status: SHIPPED | OUTPATIENT
Start: 2023-07-28

## 2023-08-10 ENCOUNTER — TELEPHONE (OUTPATIENT)
Facility: CLINIC | Age: 68
End: 2023-08-10

## 2023-08-10 NOTE — TELEPHONE ENCOUNTER
Pt's spouse requesting Referral for Arthritis to be sent to Dr. Zahra Cade.     [address] [facility], [address] [place] [address]    YE#[phone]

## 2023-08-10 NOTE — TELEPHONE ENCOUNTER
PT's spouse called back saying to cancel the referral request because she doen'st know what going on with the PT's other appts.

## 2023-09-12 ENCOUNTER — HOSPITAL ENCOUNTER (OUTPATIENT)
Facility: HOSPITAL | Age: 68
Setting detail: SPECIMEN
Discharge: HOME | End: 2023-09-15
Payer: MEDICARE

## 2023-09-12 ENCOUNTER — OFFICE VISIT (OUTPATIENT)
Facility: CLINIC | Age: 68
End: 2023-09-12
Payer: MEDICARE

## 2023-09-12 VITALS
BODY MASS INDEX: 25.22 KG/M2 | HEART RATE: 81 BPM | TEMPERATURE: 97.3 F | RESPIRATION RATE: 20 BRPM | SYSTOLIC BLOOD PRESSURE: 115 MMHG | WEIGHT: 176.2 LBS | DIASTOLIC BLOOD PRESSURE: 71 MMHG | HEIGHT: 70 IN | OXYGEN SATURATION: 95 %

## 2023-09-12 DIAGNOSIS — E11.49 TYPE 2 DIABETES MELLITUS WITH OTHER DIABETIC NEUROLOGICAL COMPLICATION (HCC): ICD-10-CM

## 2023-09-12 DIAGNOSIS — Z71.6 TOBACCO ABUSE COUNSELING: ICD-10-CM

## 2023-09-12 DIAGNOSIS — E78.2 MIXED HYPERLIPIDEMIA: ICD-10-CM

## 2023-09-12 DIAGNOSIS — F17.200 TOBACCO USE DISORDER: ICD-10-CM

## 2023-09-12 DIAGNOSIS — I10 ESSENTIAL (PRIMARY) HYPERTENSION: Primary | ICD-10-CM

## 2023-09-12 DIAGNOSIS — G89.29 OTHER CHRONIC PAIN: ICD-10-CM

## 2023-09-12 DIAGNOSIS — M48.02 SPINAL STENOSIS, CERVICAL REGION: ICD-10-CM

## 2023-09-12 LAB
ALBUMIN SERPL-MCNC: 3.7 G/DL (ref 3.4–5)
ALBUMIN/GLOB SERPL: 1 (ref 0.8–1.7)
ALP SERPL-CCNC: 77 U/L (ref 45–117)
ALT SERPL-CCNC: 28 U/L (ref 16–61)
ANION GAP SERPL CALC-SCNC: 5 MMOL/L (ref 3–18)
AST SERPL-CCNC: 18 U/L (ref 10–38)
BILIRUB SERPL-MCNC: 0.3 MG/DL (ref 0.2–1)
BUN SERPL-MCNC: 18 MG/DL (ref 7–18)
BUN/CREAT SERPL: 16 (ref 12–20)
CALCIUM SERPL-MCNC: 9.2 MG/DL (ref 8.5–10.1)
CHLORIDE SERPL-SCNC: 104 MMOL/L (ref 100–111)
CHOLEST SERPL-MCNC: 121 MG/DL
CO2 SERPL-SCNC: 28 MMOL/L (ref 21–32)
CREAT SERPL-MCNC: 1.12 MG/DL (ref 0.6–1.3)
EST. AVERAGE GLUCOSE BLD GHB EST-MCNC: 108 MG/DL
GLOBULIN SER CALC-MCNC: 3.6 G/DL (ref 2–4)
GLUCOSE SERPL-MCNC: 92 MG/DL (ref 74–99)
HBA1C MFR BLD: 5.4 % (ref 4.2–5.6)
HDLC SERPL-MCNC: 48 MG/DL (ref 40–60)
HDLC SERPL: 2.5 (ref 0–5)
LDLC SERPL CALC-MCNC: 58.6 MG/DL (ref 0–100)
LIPID PANEL: NORMAL
POTASSIUM SERPL-SCNC: 4.2 MMOL/L (ref 3.5–5.5)
PROT SERPL-MCNC: 7.3 G/DL (ref 6.4–8.2)
SODIUM SERPL-SCNC: 137 MMOL/L (ref 136–145)
TRIGL SERPL-MCNC: 72 MG/DL
VLDLC SERPL CALC-MCNC: 14.4 MG/DL

## 2023-09-12 PROCEDURE — 3017F COLORECTAL CA SCREEN DOC REV: CPT | Performed by: STUDENT IN AN ORGANIZED HEALTH CARE EDUCATION/TRAINING PROGRAM

## 2023-09-12 PROCEDURE — 2022F DILAT RTA XM EVC RTNOPTHY: CPT | Performed by: STUDENT IN AN ORGANIZED HEALTH CARE EDUCATION/TRAINING PROGRAM

## 2023-09-12 PROCEDURE — 99406 BEHAV CHNG SMOKING 3-10 MIN: CPT | Performed by: STUDENT IN AN ORGANIZED HEALTH CARE EDUCATION/TRAINING PROGRAM

## 2023-09-12 PROCEDURE — 80053 COMPREHEN METABOLIC PANEL: CPT

## 2023-09-12 PROCEDURE — 3078F DIAST BP <80 MM HG: CPT | Performed by: STUDENT IN AN ORGANIZED HEALTH CARE EDUCATION/TRAINING PROGRAM

## 2023-09-12 PROCEDURE — 3044F HG A1C LEVEL LT 7.0%: CPT | Performed by: STUDENT IN AN ORGANIZED HEALTH CARE EDUCATION/TRAINING PROGRAM

## 2023-09-12 PROCEDURE — 3074F SYST BP LT 130 MM HG: CPT | Performed by: STUDENT IN AN ORGANIZED HEALTH CARE EDUCATION/TRAINING PROGRAM

## 2023-09-12 PROCEDURE — 99214 OFFICE O/P EST MOD 30 MIN: CPT | Performed by: STUDENT IN AN ORGANIZED HEALTH CARE EDUCATION/TRAINING PROGRAM

## 2023-09-12 PROCEDURE — G8417 CALC BMI ABV UP PARAM F/U: HCPCS | Performed by: STUDENT IN AN ORGANIZED HEALTH CARE EDUCATION/TRAINING PROGRAM

## 2023-09-12 PROCEDURE — 36415 COLL VENOUS BLD VENIPUNCTURE: CPT

## 2023-09-12 PROCEDURE — 4004F PT TOBACCO SCREEN RCVD TLK: CPT | Performed by: STUDENT IN AN ORGANIZED HEALTH CARE EDUCATION/TRAINING PROGRAM

## 2023-09-12 PROCEDURE — 83036 HEMOGLOBIN GLYCOSYLATED A1C: CPT

## 2023-09-12 PROCEDURE — 80061 LIPID PANEL: CPT

## 2023-09-12 PROCEDURE — G8427 DOCREV CUR MEDS BY ELIG CLIN: HCPCS | Performed by: STUDENT IN AN ORGANIZED HEALTH CARE EDUCATION/TRAINING PROGRAM

## 2023-09-12 PROCEDURE — 1123F ACP DISCUSS/DSCN MKR DOCD: CPT | Performed by: STUDENT IN AN ORGANIZED HEALTH CARE EDUCATION/TRAINING PROGRAM

## 2023-09-12 ASSESSMENT — ENCOUNTER SYMPTOMS
ABDOMINAL PAIN: 0
SHORTNESS OF BREATH: 0

## 2023-09-12 ASSESSMENT — PATIENT HEALTH QUESTIONNAIRE - PHQ9
SUM OF ALL RESPONSES TO PHQ QUESTIONS 1-9: 0
SUM OF ALL RESPONSES TO PHQ QUESTIONS 1-9: 0
2. FEELING DOWN, DEPRESSED OR HOPELESS: 0
SUM OF ALL RESPONSES TO PHQ QUESTIONS 1-9: 0
SUM OF ALL RESPONSES TO PHQ9 QUESTIONS 1 & 2: 0
SUM OF ALL RESPONSES TO PHQ QUESTIONS 1-9: 0
1. LITTLE INTEREST OR PLEASURE IN DOING THINGS: 0

## 2023-09-12 NOTE — PATIENT INSTRUCTIONS
[facility]   [address] [facility] [address]   [place], [address] [place]    [phone]

## 2023-09-12 NOTE — PROGRESS NOTES
1. \"Have you been to the ER, urgent care clinic since your last visit? Hospitalized since your last visit? \" No    2. \"Have you seen or consulted any other health care providers outside of the [address] [place] [address] since your last visit? \" No    3. For patients aged 43-73: Has the patient had a colonoscopy / FIT/ Cologuard? Yes - no Care Gap present      If the patient is female:    4. For patients aged 43-66: Has the patient had a mammogram within the past 2 years? NA - based on age or sex      11. For patients aged 21-65: Has the patient had a pap smear?  NA - based on age or sex

## 2023-09-22 RX ORDER — ASPIRIN 81 MG/1
81 TABLET ORAL DAILY
COMMUNITY
Start: 2023-01-16

## 2023-09-22 NOTE — PROGRESS NOTES
Instructions for your procedure at [address] [place] [address] Date: 9/22/2023      Patient's Name: Kal Canchola. Procedure Date: 09/27/2023        Please enter the main entrance of the hospital and check-in at the  located in the lobby. Do NOT eat or drink anything, including candy, gum, or ice chips after midnight prior to your procedure, unless it is part of your prep. Brush your teeth before coming to the hospital.You may swish with water, but do not swallow. No smoking/Vaping/E-Cigarettes 24 hours prior to the day of procedure. No alcohol 24 hours prior to the day of procedure. No recreational drugs for one week prior to the day of procedure. Bring Photo ID, Insurance information, and Co-pay if required on day of procedure. Bring in pertinent legal documents, such as, Medical Power of JUANY RUSTY, DNR, Advance Directive, etc.  Leave all other valuables, including money/purse, at home. Remove jewelry, including ALL body piercings, nail polish, acrylic nails, and makeup (including mascara); no lotions, powders, deodorant, and/or perfume/cologne/after shave on the skin. Glasses and dentures may be worn to the hospital.  They must be removed prior to procedure. Please bring case/container for glasses or dentures. 11. Contacts should not be worn on day of procedure. 12. Call the office ([phone]) if you have symptoms of a cold or illness within 24-48 hours prior to your procedure. 13. AN ADULT (relative or friend 18 years or older) MUST DRIVE YOU HOME AFTER YOUR PROCEDURE. 14. Please make arrangements for a responsible adult (18 years or older) to be with you for 24 hours after your procedure. 13. ONE VISITOR will be allowed in the waiting area during your procedure. Special Instructions:      Bring list of CURRENT medications. Follow instructions from the office regarding In Blood Pressure/Heart medications. Bring inhaler.       Any questions

## 2023-09-26 ENCOUNTER — ANESTHESIA EVENT (OUTPATIENT)
Facility: HOSPITAL | Age: 68
End: 2023-09-26
Payer: MEDICARE

## 2023-09-26 RX ORDER — TIZANIDINE 2 MG/1
TABLET ORAL
Qty: 30 TABLET | Refills: 0 | Status: SHIPPED | OUTPATIENT
Start: 2023-09-26

## 2023-09-27 ENCOUNTER — HOSPITAL ENCOUNTER (OUTPATIENT)
Facility: HOSPITAL | Age: 68
Setting detail: OUTPATIENT SURGERY
Discharge: HOME | End: 2023-09-27
Attending: INTERNAL MEDICINE | Admitting: INTERNAL MEDICINE
Payer: MEDICARE

## 2023-09-27 ENCOUNTER — ANESTHESIA (OUTPATIENT)
Facility: HOSPITAL | Age: 68
End: 2023-09-27
Payer: MEDICARE

## 2023-09-27 VITALS
DIASTOLIC BLOOD PRESSURE: 69 MMHG | WEIGHT: 175.6 LBS | TEMPERATURE: 97.4 F | HEIGHT: 70 IN | HEART RATE: 63 BPM | BODY MASS INDEX: 25.14 KG/M2 | RESPIRATION RATE: 13 BRPM | SYSTOLIC BLOOD PRESSURE: 137 MMHG | OXYGEN SATURATION: 100 %

## 2023-09-27 PROCEDURE — 7100000010 HC PHASE II RECOVERY - FIRST 15 MIN: Performed by: INTERNAL MEDICINE

## 2023-09-27 PROCEDURE — 2500000003 HC RX 250 WO HCPCS: Performed by: NURSE ANESTHETIST, CERTIFIED REGISTERED

## 2023-09-27 PROCEDURE — 6360000002 HC RX W HCPCS: Performed by: NURSE ANESTHETIST, CERTIFIED REGISTERED

## 2023-09-27 PROCEDURE — 2709999900 HC NON-CHARGEABLE SUPPLY: Performed by: INTERNAL MEDICINE

## 2023-09-27 PROCEDURE — 7100000000 HC PACU RECOVERY - FIRST 15 MIN: Performed by: INTERNAL MEDICINE

## 2023-09-27 PROCEDURE — 2580000003 HC RX 258: Performed by: NURSE ANESTHETIST, CERTIFIED REGISTERED

## 2023-09-27 PROCEDURE — 3600007502: Performed by: INTERNAL MEDICINE

## 2023-09-27 PROCEDURE — 88305 TISSUE EXAM BY PATHOLOGIST: CPT

## 2023-09-27 PROCEDURE — 7100000011 HC PHASE II RECOVERY - ADDTL 15 MIN: Performed by: INTERNAL MEDICINE

## 2023-09-27 PROCEDURE — 3700000000 HC ANESTHESIA ATTENDED CARE: Performed by: INTERNAL MEDICINE

## 2023-09-27 RX ORDER — INSULIN LISPRO 100 [IU]/ML
0-12 INJECTION, SOLUTION INTRAVENOUS; SUBCUTANEOUS ONCE
Status: DISCONTINUED | OUTPATIENT
Start: 2023-09-27 | End: 2023-09-27 | Stop reason: HOSPADM

## 2023-09-27 RX ORDER — SODIUM CHLORIDE 9 MG/ML
INJECTION, SOLUTION INTRAVENOUS PRN
Status: DISCONTINUED | OUTPATIENT
Start: 2023-09-27 | End: 2023-09-27 | Stop reason: HOSPADM

## 2023-09-27 RX ORDER — DEXTROSE MONOHYDRATE 100 MG/ML
INJECTION, SOLUTION INTRAVENOUS CONTINUOUS PRN
Status: DISCONTINUED | OUTPATIENT
Start: 2023-09-27 | End: 2023-09-27 | Stop reason: HOSPADM

## 2023-09-27 RX ORDER — LIDOCAINE HYDROCHLORIDE 20 MG/ML
INJECTION, SOLUTION EPIDURAL; INFILTRATION; INTRACAUDAL; PERINEURAL PRN
Status: DISCONTINUED | OUTPATIENT
Start: 2023-09-27 | End: 2023-09-27 | Stop reason: SDUPTHER

## 2023-09-27 RX ORDER — SODIUM CHLORIDE, SODIUM LACTATE, POTASSIUM CHLORIDE, CALCIUM CHLORIDE 600; 310; 30; 20 MG/100ML; MG/100ML; MG/100ML; MG/100ML
INJECTION, SOLUTION INTRAVENOUS CONTINUOUS
Status: DISCONTINUED | OUTPATIENT
Start: 2023-09-27 | End: 2023-09-27 | Stop reason: HOSPADM

## 2023-09-27 RX ORDER — SODIUM CHLORIDE 0.9 % (FLUSH) 0.9 %
5-40 SYRINGE (ML) INJECTION EVERY 12 HOURS SCHEDULED
Status: DISCONTINUED | OUTPATIENT
Start: 2023-09-27 | End: 2023-09-27 | Stop reason: HOSPADM

## 2023-09-27 RX ORDER — SODIUM CHLORIDE 0.9 % (FLUSH) 0.9 %
5-40 SYRINGE (ML) INJECTION PRN
Status: DISCONTINUED | OUTPATIENT
Start: 2023-09-27 | End: 2023-09-27 | Stop reason: HOSPADM

## 2023-09-27 RX ORDER — PROPOFOL 10 MG/ML
INJECTION, EMULSION INTRAVENOUS PRN
Status: DISCONTINUED | OUTPATIENT
Start: 2023-09-27 | End: 2023-09-27 | Stop reason: SDUPTHER

## 2023-09-27 RX ADMIN — SODIUM CHLORIDE, POTASSIUM CHLORIDE, SODIUM LACTATE AND CALCIUM CHLORIDE: 600; 310; 30; 20 INJECTION, SOLUTION INTRAVENOUS at 07:18

## 2023-09-27 RX ADMIN — LIDOCAINE HYDROCHLORIDE 60 MG: 20 INJECTION, SOLUTION EPIDURAL; INFILTRATION; INTRACAUDAL; PERINEURAL at 07:50

## 2023-09-27 RX ADMIN — PROPOFOL 100 MG: 10 INJECTION, EMULSION INTRAVENOUS at 07:50

## 2023-09-27 RX ADMIN — PROPOFOL 100 MG: 10 INJECTION, EMULSION INTRAVENOUS at 07:55

## 2023-09-27 ASSESSMENT — ENCOUNTER SYMPTOMS: SHORTNESS OF BREATH: 1

## 2023-09-27 NOTE — DISCHARGE INSTRUCTIONS
Upper GI Endoscopy: What to Expect at [address] [place] [address] [place] had an upper GI endoscopy. Your doctor used a thin, lighted tube that bends to look at the inside of your esophagus, your stomach, and the first part of the small intestine, called the duodenum. After you have an endoscopy, you will stay at the hospital or clinic for 1 to 2 hours. This will allow the medicine to wear off. You will be able to go home after your doctor or nurse checks to make sure that you're not having any problems. You may have to stay overnight if you had treatment during the test. You may have a sore throat for a day or two after the test.  This care sheet gives you a general idea about what to expect after the test.  How can you care for yourself at home? Activity   Rest as much as you need to after you go home. You should be able to go back to your usual activities the day after the test.  Diet   Follow your doctor's directions for eating after the test.  Drink plenty of fluids (unless your doctor has told you not to). Medications   If you have a sore throat the day after the test, use an over-the-counter spray to numb your throat. Follow-up care is a key part of your treatment and safety. Be sure to make and go to all appointments, and call your doctor if you are having problems. It's also a good idea to know your test results and keep a list of the medicines you take. When should you call for help? Call 911 anytime you think you may need emergency care. For example, call if:    You passed out (lost consciousness). You have trouble breathing. You pass maroon or bloody stools. Call your doctor now or seek immediate medical care if:    You have pain that does not get better after your take pain medicine. You have new or worse belly pain. You have blood in your stools. You are sick to your stomach and cannot keep fluids down. You have a fever. You cannot pass stools or gas.    Watch closely for

## 2023-09-27 NOTE — ANESTHESIA POSTPROCEDURE EVALUATION
Department of Anesthesiology  Postprocedure Note    Patient: Sabiha Mendieta   MRN: [id number]  YOB: 1955  Date of evaluation: 9/27/2023      Procedure Summary     Date: 09/27/23 Room / Location: [facility] ENDO 02 / [facility] ENDOSCOPY    Anesthesia Start: 3767 Anesthesia Stop: 0806    Procedure: EGD ESOPHAGOGASTRODUODENOSCOPY DILATION/ BIOPSIES (Upper GI Region) Diagnosis:       Nausea and vomiting, unspecified vomiting type      Decreased appetite      Dysphagia, unspecified type      Abdominal pain, epigastric      Abdominal bloating      BMI 25.0-25.9,adult      (Nausea and vomiting, unspecified vomiting type [R11.2])      (Decreased appetite [R63.0])      (Dysphagia, unspecified type [R13.10])      (Abdominal pain, epigastric [R10.13])      (Abdominal bloating [R14.0])      (BMI 25.0-25.9,adult [Z68.25])    Surgeons: Karmen Nova MD Responsible Provider: Hermilo Taylor MD    Anesthesia Type: MAC ASA Status: 3          Anesthesia Type: MAC    Azam Phase I: Azam Score: 10    Azam Phase II: Azam Score: 10      Anesthesia Post Evaluation    Patient location during evaluation: bedside  Patient participation: complete - patient participated  Level of consciousness: responsive to verbal stimuli  Airway patency: patent  Nausea & Vomiting: no nausea  Complications: no  Respiratory status: acceptable  Hydration status: euvolemic

## 2023-09-27 NOTE — H&P
[email or web address]  [phone]    GASTROENTEROLOGY CONSULT      Impression:   1. N/V  2. Epigastric  pain   3. Dysphagia        Plan:     1. EGD Dil mac all risks benefits and alt discussed       Chief Complaint: dysphagia       HPI:  Faye Rosario is a 70 y.o. male who I am being asked to see in consultation for an opinion regarding n/v pain and dysphagia  .     PMH:   Past Medical History:   Diagnosis Date    Diabetes mellitus ([address])     Hypertension     Neuropathy     SOB (shortness of breath)     Spinal stenosis        PSH:   Past Surgical History:   Procedure Laterality Date    APPENDECTOMY      BACK SURGERY      CERVICAL FUSION  2012    Flora Olmstead  2011    Dr. Shar Kowalski HX:   Social History     Socioeconomic History    Marital status:      Spouse name: Not on file    Number of children: Not on file    Years of education: Not on file    Highest education level: Not on file   Occupational History    Not on file   Tobacco Use    Smoking status: Every Day     Packs/day: 0.50     Years: 40.00     Additional pack years: 0.00     Total pack years: 20.00     Types: Cigarettes     Passive exposure: Never    Smokeless tobacco: Never   Vaping Use    Vaping Use: Not on file   Substance and Sexual Activity    Alcohol use: Not Currently     Alcohol/week: 1.0 standard drink of alcohol    Drug use: Never    Sexual activity: Not on file   Other Topics Concern    Not on file   Social History Narrative    Not on file     Social Determinants of Health     Financial Resource Strain: High Risk (3/7/2023)    Overall Financial Resource Strain (CARDIA)     Difficulty of Paying Living Expenses: Very hard   Food Insecurity: Food Insecurity Present (3/7/2023)    Hunger Vital Sign     Worried About Running Out of Food in the Last Year: Sometimes true     Ran Out of Food in the Last Year: Sometimes true   Transportation Needs: Unknown (3/7/2023)    PRAPARE - Transportation     Lack of

## 2023-11-07 RX ORDER — TIZANIDINE 2 MG/1
TABLET ORAL
Qty: 90 TABLET | Refills: 0 | Status: SHIPPED | OUTPATIENT
Start: 2023-11-07

## 2023-11-07 NOTE — TELEPHONE ENCOUNTER
Last Appointment:  9/12/2023  Future Appointments   Date Time Provider [address]   12/12/2023  8:00 AM Eloisa Cheek DO [facility]

## 2024-02-08 DIAGNOSIS — M19.042 PRIMARY OSTEOARTHRITIS OF BOTH HANDS: ICD-10-CM

## 2024-02-08 DIAGNOSIS — I70.0 ATHEROSCLEROSIS OF AORTA (HCC): ICD-10-CM

## 2024-02-08 DIAGNOSIS — G89.29 OTHER CHRONIC PAIN: ICD-10-CM

## 2024-02-08 DIAGNOSIS — M19.041 PRIMARY OSTEOARTHRITIS OF BOTH HANDS: ICD-10-CM

## 2024-02-08 RX ORDER — MELOXICAM 15 MG/1
TABLET ORAL
Qty: 30 TABLET | Refills: 0 | OUTPATIENT
Start: 2024-02-08

## 2024-02-08 RX ORDER — SALICYLIC ACID 40 %
81 ADHESIVE PATCH, MEDICATED TOPICAL DAILY
Qty: 90 TABLET | Refills: 3 | Status: SHIPPED | OUTPATIENT
Start: 2024-02-08

## 2024-02-08 RX ORDER — LISINOPRIL 10 MG/1
10 TABLET ORAL DAILY
Qty: 90 TABLET | Refills: 0 | Status: SHIPPED | OUTPATIENT
Start: 2024-02-08

## 2024-03-22 ENCOUNTER — OFFICE VISIT (OUTPATIENT)
Facility: CLINIC | Age: 69
End: 2024-03-22

## 2024-03-22 ENCOUNTER — HOSPITAL ENCOUNTER (OUTPATIENT)
Facility: HOSPITAL | Age: 69
Setting detail: SPECIMEN
End: 2024-03-22
Payer: MEDICARE

## 2024-03-22 VITALS
SYSTOLIC BLOOD PRESSURE: 98 MMHG | RESPIRATION RATE: 20 BRPM | TEMPERATURE: 97.1 F | WEIGHT: 171 LBS | HEART RATE: 93 BPM | OXYGEN SATURATION: 93 % | DIASTOLIC BLOOD PRESSURE: 62 MMHG | HEIGHT: 70 IN | BODY MASS INDEX: 24.48 KG/M2

## 2024-03-22 DIAGNOSIS — E11.49 TYPE 2 DIABETES MELLITUS WITH OTHER DIABETIC NEUROLOGICAL COMPLICATION (HCC): ICD-10-CM

## 2024-03-22 DIAGNOSIS — R29.898 WEAKNESS OF RIGHT UPPER EXTREMITY: ICD-10-CM

## 2024-03-22 DIAGNOSIS — Z71.6 TOBACCO ABUSE COUNSELING: ICD-10-CM

## 2024-03-22 DIAGNOSIS — I10 ESSENTIAL (PRIMARY) HYPERTENSION: ICD-10-CM

## 2024-03-22 DIAGNOSIS — R29.898 WEAKNESS OF LEFT LOWER EXTREMITY: ICD-10-CM

## 2024-03-22 DIAGNOSIS — R29.898 WEAKNESS OF LEFT UPPER EXTREMITY: ICD-10-CM

## 2024-03-22 DIAGNOSIS — Z86.73 HISTORY OF CVA (CEREBROVASCULAR ACCIDENT): ICD-10-CM

## 2024-03-22 DIAGNOSIS — G47.10 HYPERSOMNIA: ICD-10-CM

## 2024-03-22 DIAGNOSIS — I69.351 HEMIPARESIS AFFECTING RIGHT SIDE AS LATE EFFECT OF STROKE (HCC): ICD-10-CM

## 2024-03-22 DIAGNOSIS — E78.2 MIXED HYPERLIPIDEMIA: ICD-10-CM

## 2024-03-22 DIAGNOSIS — R53.82 CHRONIC FATIGUE: ICD-10-CM

## 2024-03-22 DIAGNOSIS — R29.898 WEAKNESS OF RIGHT LOWER EXTREMITY: Primary | ICD-10-CM

## 2024-03-22 DIAGNOSIS — F17.200 TOBACCO USE DISORDER: ICD-10-CM

## 2024-03-22 PROCEDURE — 86042 ACETYLCHOLN RCPTR BLCKG ANTB: CPT

## 2024-03-22 PROCEDURE — 86041 ACETYLCHOLN RCPTR BNDNG ANTB: CPT

## 2024-03-22 PROCEDURE — 86043 ACETYLCHOLN RCPTR MODLG ANTB: CPT

## 2024-03-22 PROCEDURE — 36415 COLL VENOUS BLD VENIPUNCTURE: CPT

## 2024-03-22 SDOH — ECONOMIC STABILITY: FOOD INSECURITY: WITHIN THE PAST 12 MONTHS, THE FOOD YOU BOUGHT JUST DIDN'T LAST AND YOU DIDN'T HAVE MONEY TO GET MORE.: SOMETIMES TRUE

## 2024-03-22 SDOH — ECONOMIC STABILITY: INCOME INSECURITY: HOW HARD IS IT FOR YOU TO PAY FOR THE VERY BASICS LIKE FOOD, HOUSING, MEDICAL CARE, AND HEATING?: HARD

## 2024-03-22 SDOH — ECONOMIC STABILITY: FOOD INSECURITY: WITHIN THE PAST 12 MONTHS, YOU WORRIED THAT YOUR FOOD WOULD RUN OUT BEFORE YOU GOT MONEY TO BUY MORE.: SOMETIMES TRUE

## 2024-03-22 ASSESSMENT — PATIENT HEALTH QUESTIONNAIRE - PHQ9
1. LITTLE INTEREST OR PLEASURE IN DOING THINGS: NOT AT ALL
SUM OF ALL RESPONSES TO PHQ9 QUESTIONS 1 & 2: 0
2. FEELING DOWN, DEPRESSED OR HOPELESS: NOT AT ALL
SUM OF ALL RESPONSES TO PHQ QUESTIONS 1-9: 0

## 2024-03-22 ASSESSMENT — ENCOUNTER SYMPTOMS
ABDOMINAL PAIN: 0
SHORTNESS OF BREATH: 0

## 2024-03-22 NOTE — PATIENT INSTRUCTIONS
[phone]  Website: www.pafcares.org    [facility]  What they offer: Mobile health clinic for uninsured community members  Phone number: [phone]    [employer]  What they offer: Partnership with the [facility] of . Assist with finding and applying for government funded programs and benefits.  You can also update your benefits or report changes through [employer]ely.  Website: https://www.OKpanda/  Phone Number: 803-3QDVYVA (000-522-1091)    [facility] [place] [employer]  What they offer: EnergyShare is [facility]’s energy assistance program of last resort for anyone who faces financial hardships from unemployment or family crisis.   Phone Number: [phone]   Address: [address] [place] [address]   Website: https://www.Budge/[place]/billing/billing-options/energyshare     Energy Assistance Programs (EAP) - Department of    What they offer: EAP assists low income households in meeting their immediate home energy needs.   Phone Number: 551.511.4667 or contact your local department of    Website: https://www.[facility].gov/benefit/ea/     Financial  Coalition Against Poverty (CAPS)  What they offer: Provide support to [place] residents who are experiencing crises by assessing their needs, providing information and resources, and directly contributing financially  Website: https://www.[facility].org/our-services/#emergency  [place] residents can make an appointment by calling [facility] at [phone] or emailing [email or web address]        Medication  Good Rx   What they offer: Good Rx tracks prescription drug prices and provides free drug coupons for discounts on medications.   Website: https://www.[employer].Ask The Doctor/     [employer]edy[employer]eds   What they offer: [employer]eds offers free information on medications and healthcare cost savings programs including prescription assistance programs, coupons, and discount programs.

## 2024-03-22 NOTE — PROGRESS NOTES
HISTORY OF PRESENT ILLNESS  Paula Looney Jr. is a 68 y.o. male presenting today for   Chief Complaint   Patient presents with    Hypertension    Diabetes        Presents today with his wife who helps present HPI:    HTN- Taking lisinopril 10mg daily. Denies changes in vision hearing or headaches     T2D- Diet controlled. .      HLD- Taking lipitor 10mg daily. Denies myalgias      Weakness-Stroke like symptoms started 1 mo ago. Woke up with R sided weakness in arms and legs and slight facial droop. He did not go to the ER. Admits this has happened before when he passed out in his driveway and didn't go to the ER at that time either. He has been experiencing increasingly daytime fatigue and falls asleep while just about anywhere. His wife states he is often jerking in his sleep. This has his wife concerned.  Denies vision changes or difficulty with swallowing. Currently taking ASA 81mg and Meloxicam for arthritic pain.            Review of Systems   Eyes:  Negative for visual disturbance.   Respiratory:  Negative for shortness of breath.    Cardiovascular:  Negative for chest pain.   Gastrointestinal:  Negative for abdominal pain.   Musculoskeletal:  Positive for arthralgias (chronic).   Neurological:  Positive for facial asymmetry and weakness. Negative for headaches.         BP 98/62   Pulse 93   Temp 97.1 °F (36.2 °C) (Skin)   Resp 20   Ht 1.778 m (5' 10\")   Wt 77.6 kg (171 lb)   SpO2 93%   BMI 24.54 kg/m²     Physical Exam  HENT:      Mouth/Throat:      Comments: MASK  Eyes:      Pupils: Pupils are equal, round, and reactive to light.   Cardiovascular:      Rate and Rhythm: Normal rate and regular rhythm.   Pulmonary:      Effort: Pulmonary effort is normal.      Breath sounds: Normal breath sounds.   Musculoskeletal:      Right lower leg: No edema.      Left lower leg: No edema.   Neurological:      Cranial Nerves: Facial asymmetry present.      Motor: Weakness present.      Gait: Gait abnormal.

## 2024-03-22 NOTE — PROGRESS NOTES
\"Have you been to the ER, urgent care clinic since your last visit?  Hospitalized since your last visit?\"    NO    “Have you seen or consulted any other health care providers outside of [facility] since your last visit?”    NO

## 2024-03-26 ENCOUNTER — HOME HEALTH ADMISSION (OUTPATIENT)
Age: 69
End: 2024-03-26
Payer: MEDICARE

## 2024-03-26 LAB
ACHR AB SER-SCNC: 0.04 NMOL/L (ref 0–0.24)
ACHR BLOCK AB SER-ACNC: 24 % (ref 0–25)
ACHR MOD AB/ACHR TOTAL SFR SER: NORMAL %
ACHR MODULATING AB: NORMAL %

## 2024-03-27 ENCOUNTER — HOME CARE VISIT (OUTPATIENT)
Age: 69
End: 2024-03-27

## 2024-03-28 ENCOUNTER — HOME CARE VISIT (OUTPATIENT)
Age: 69
End: 2024-03-28

## 2024-03-29 ENCOUNTER — HOME CARE VISIT (OUTPATIENT)
Age: 69
End: 2024-03-29

## 2024-03-29 NOTE — CASE COMMUNICATION
Called patient on [phone] to arrange Homecare SOC and patient requested Tuesday April 2nd after 10 am.  Per patient request moved to 4.2.4 for HH admit visit.

## 2024-04-01 ENCOUNTER — TELEPHONE (OUTPATIENT)
Facility: CLINIC | Age: 69
End: 2024-04-01

## 2024-04-01 DIAGNOSIS — I69.351 HEMIPARESIS AFFECTING RIGHT SIDE AS LATE EFFECT OF STROKE (HCC): Primary | ICD-10-CM

## 2024-04-01 DIAGNOSIS — Z86.73 HISTORY OF CVA (CEREBROVASCULAR ACCIDENT): ICD-10-CM

## 2024-04-01 DIAGNOSIS — R53.1 WEAKNESS: ICD-10-CM

## 2024-04-01 LAB
ACHR AB SER-SCNC: 0.04 NMOL/L (ref 0–0.24)
ACHR BLOCK AB SER-ACNC: 24 % (ref 0–25)
ACHR MOD AB/ACHR TOTAL SFR SER: NORMAL %
ACHR MODULATING AB: 2 % (ref 0–45)

## 2024-04-02 ENCOUNTER — HOME CARE VISIT (OUTPATIENT)
Age: 69
End: 2024-04-02
Payer: MEDICARE

## 2024-04-02 DIAGNOSIS — M19.041 PRIMARY OSTEOARTHRITIS OF BOTH HANDS: ICD-10-CM

## 2024-04-02 DIAGNOSIS — G89.29 OTHER CHRONIC PAIN: ICD-10-CM

## 2024-04-02 DIAGNOSIS — M19.042 PRIMARY OSTEOARTHRITIS OF BOTH HANDS: ICD-10-CM

## 2024-04-02 PROCEDURE — G0299 HHS/HOSPICE OF RN EA 15 MIN: HCPCS

## 2024-04-02 RX ORDER — TRIAMCINOLONE ACETONIDE 0.25 MG/G
CREAM TOPICAL 2 TIMES DAILY
Qty: 15 G | Refills: 1 | Status: SHIPPED | OUTPATIENT
Start: 2024-04-02

## 2024-04-02 RX ORDER — FLUTICASONE PROPIONATE 110 UG/1
1 AEROSOL, METERED RESPIRATORY (INHALATION) EVERY 12 HOURS
Qty: 12 G | Refills: 0 | Status: SHIPPED | OUTPATIENT
Start: 2024-04-02

## 2024-04-02 RX ORDER — MELOXICAM 15 MG/1
15 TABLET ORAL DAILY
Qty: 90 TABLET | Refills: 0 | Status: SHIPPED | OUTPATIENT
Start: 2024-04-02

## 2024-04-02 RX ORDER — CETIRIZINE HYDROCHLORIDE 10 MG/1
10 TABLET ORAL DAILY
Qty: 90 TABLET | Refills: 0 | Status: SHIPPED | OUTPATIENT
Start: 2024-04-02

## 2024-04-02 RX ORDER — LISINOPRIL 10 MG/1
10 TABLET ORAL DAILY
Qty: 90 TABLET | Refills: 0 | Status: SHIPPED | OUTPATIENT
Start: 2024-04-02

## 2024-04-02 RX ORDER — METHOCARBAMOL 500 MG/1
TABLET, FILM COATED ORAL
Qty: 180 TABLET | Refills: 1 | Status: SHIPPED | OUTPATIENT
Start: 2024-04-02

## 2024-04-02 ASSESSMENT — ENCOUNTER SYMPTOMS: HEMOPTYSIS: 0

## 2024-04-02 NOTE — TELEPHONE ENCOUNTER
Jasmin, a nurse with [facility] is calling in stating the patient is in need of refills on the following medications to the [facility] on file.  Also requesting a prescription for a glucometer to check the patient's sugar.  States they borrowed his wife's reader this morning and his sugar was 126 fasting.    meloxicam (MOBIC) 15 MG tablet [[id number]]    Order Details  Dose, Route, Frequency: As Directed   Dispense Quantity: 30 tablet Refills: 0          Sig: TAKE 1 TABLET BY MOUTH EVERY DAY       triamcinolone (KENALOG) 0.025 % cream [[id number]]    Order Details  Dose: -- Route: Topical Frequency: 2 TIMES DAILY   Dispense Quantity: -- Refills: --          Sig: Apply topically 2 times daily     luticasone (FLOVENT HFA) 110 MCG/ACT inhaler [[id number]]    Order Details  Dose: 1 puff Route: Inhalation Frequency: EVERY 12 HOURS   Dispense Quantity: -- Refills: --          Sig: Inhale 1 puff into the lungs in the morning and 1 puff in the evening.       cetirizine (ZYRTEC) 10 MG tablet [[id number]]    Order Details  Dose: 10 mg Route: Oral Frequency: DAILY   Dispense Quantity: -- Refills: --          Sig: Take 1 tablet by mouth daily

## 2024-04-03 ENCOUNTER — HOME CARE VISIT (OUTPATIENT)
Age: 69
End: 2024-04-03
Payer: MEDICARE

## 2024-04-03 VITALS
RESPIRATION RATE: 18 BRPM | TEMPERATURE: 97 F | HEART RATE: 68 BPM | OXYGEN SATURATION: 96 % | DIASTOLIC BLOOD PRESSURE: 68 MMHG | SYSTOLIC BLOOD PRESSURE: 108 MMHG

## 2024-04-03 RX ORDER — MELOXICAM 15 MG/1
TABLET ORAL
Qty: 30 TABLET | Refills: 0 | OUTPATIENT
Start: 2024-04-03

## 2024-04-03 ASSESSMENT — ENCOUNTER SYMPTOMS
PAIN LOCATION - PAIN QUALITY: DULL ACHE
DYSPNEA ACTIVITY LEVEL: AFTER AMBULATING 10 - 20 FT

## 2024-04-03 NOTE — HOME HEALTH
Skilled services/Home bound verification:      Skilled Reason for admission/summary of clinical condition: 67 y/o male c/o weakness-Stroke like symptoms started 1 mo ago. Woke up with R sided weakness in arms and legs and slight facial droop. He did not go to the ER. Admits this has happened before when he passed out in his driveway and didn't go to the ER at that time either. He has been experiencing increasingly daytime fatigue and falls asleep while just about anywhere. His wife states he is often jerking in his sleep. This has his wife concerned.  Denies vision changes or difficulty with swallowing. Currently taking ASA 81mg and Meloxicam for arthritic pain. Dx: DM, HTN    This patient is homebound for the following reasons Requires considerable and taxing effort to leave the home  and Only leaves the home for medical reasons or Religion services and are infrequent and of short duration for other reasons.    Skilled care: Teaching disease and medication management, completed assessment, explained [facility] admission packet, discussed POC, SNV freq, PT, OT eval and d/c plan.     Patient response to procedure performed:  Pt tolerated well during the assessment procedure with no c/o.    Caregiver: Caregiver/wife  available to assist with daily meals, ADL's prn, run errands, assist/provide reminders with daily medications, and accompany to MD appt prn.  Medications reconciled and all medications listed are available in the home this visit except Meloxicam, Tezanidine, Cyrtec, Flonace and Triamcinolone. Called PCP's office, spoke to [place] informed med's not at home replied PCP will be notified and Pt will be notified when its ready to be picked. Discussed taking daily prescribed med's on timely basis and taking with food.     The following education was provided regarding medications: Aspirin s/e: abdominal cramps, bloody or black and tarry stool, bloody/dark urine, chest pain discomfort and confusion. Notify MD if any of

## 2024-04-05 ENCOUNTER — TELEPHONE (OUTPATIENT)
Facility: CLINIC | Age: 69
End: 2024-04-05

## 2024-04-05 NOTE — TELEPHONE ENCOUNTER
Spoke with patient wife and 2 patient identifiers was confirmed. Gave message and pt wife will call insurance and find out what's covered and call me back.    show

## 2024-04-05 NOTE — TELEPHONE ENCOUNTER
PT's spouse was calling back to ask [place] what the patient was supposed to find out from his insurance?

## 2024-04-08 ENCOUNTER — HOME CARE VISIT (OUTPATIENT)
Age: 69
End: 2024-04-08
Payer: MEDICARE

## 2024-04-08 NOTE — TELEPHONE ENCOUNTER
Pt is not able to sit for an MRI he has claustrophobia and is not able to tolerate this as a result. This is why CT was ordered instead. Please advise [facility] imaging

## 2024-04-09 ENCOUNTER — HOME CARE VISIT (OUTPATIENT)
Age: 69
End: 2024-04-09
Payer: MEDICARE

## 2024-04-09 VITALS
TEMPERATURE: 98 F | DIASTOLIC BLOOD PRESSURE: 78 MMHG | OXYGEN SATURATION: 99 % | SYSTOLIC BLOOD PRESSURE: 135 MMHG | HEART RATE: 91 BPM | RESPIRATION RATE: 14 BRPM

## 2024-04-09 PROCEDURE — G0151 HHCP-SERV OF PT,EA 15 MIN: HCPCS

## 2024-04-09 ASSESSMENT — ENCOUNTER SYMPTOMS: PAIN LOCATION - PAIN QUALITY: DULL ACHING

## 2024-04-09 NOTE — TELEPHONE ENCOUNTER
Spoke with imaging and was told its protocol to do a head ct w/o contrast with CTA ( they can then be compared )  Ok per Dr. Cheek      Please order.

## 2024-04-09 NOTE — HOME HEALTH
PMHx: H+P per MD office 3/22/24 Weakness-Stroke like symptoms started 1 mo ago. Woke up with R sided weakness in arms and legs and slight facial droop. He did not go to the ER. Admits this has happened before when he passed out in his driveway and didn't go to the ER at that time either. He has been experiencing increasingly daytime fatigue and falls asleep while just about anywhere. His wife states he is often jerking in his sleep. This has his wife concerned. Denies vision changes or difficulty with swallowing. Currently taking ASA 81mg and Meloxicam for arthritic pain  List of Comorbidities:   Weakness of right lower extremity  Weakness of right upper extremity  Hemiparesis affecting right side as late effect of stroke (HCC)  Hypersomnia   Chronic fatigue   Essential (primary) hypertension   Type 2 diabetes mellitus with other diabetic neurological complication (HCC)  Mixed hyperlipidemia  Tobacco use disorder  Tobacco abuse counseling  History of CVA (cerebrovascular accident      SUBJECTIVE:the pollen is bothering me today. I was sick to my stomach earlier today from the drainage down my throat.  Pt noted that he feels that his R hand has been affected more than anything. Pt reports that he is R hand dominate  LIVING SITUATION: Pt lives with wife in a single level home with 1 step to enter without HR    REQUIRES CAREGIVER ASSISTANCE FOR: transportation, medications, ADLS, IADLS   PLOF: Pt amb with SPC when knee is bothering him and without A DEV , Pt was I with dressing and bathing using shower chair   MEDICATIONS REVIEWED AND RECONCILED: no changes   NEXT MD APPT: Eloisa Cheek, DO in 3 months   EQUIPMENT: SPC LBQC, rollator walker shower chair,   ROM: WFL   STRENGTH: B hip flexors, quads, hamstrings DF/ PF 5/5   WOUNDS: none   BED MOBILITY:supine<>sit MOD I   TRANSFERS:sit to stand from chair with arms with and without B UE support. sit to stand from bed with B UE support MOD I  GAIT: Pt amb 50ft with SPC on

## 2024-04-11 ENCOUNTER — HOME CARE VISIT (OUTPATIENT)
Age: 69
End: 2024-04-11
Payer: MEDICARE

## 2024-04-11 VITALS
TEMPERATURE: 97.8 F | OXYGEN SATURATION: 98 % | HEART RATE: 88 BPM | DIASTOLIC BLOOD PRESSURE: 60 MMHG | SYSTOLIC BLOOD PRESSURE: 122 MMHG | RESPIRATION RATE: 18 BRPM

## 2024-04-11 PROCEDURE — G0300 HHS/HOSPICE OF LPN EA 15 MIN: HCPCS

## 2024-04-12 NOTE — HOME HEALTH
Skilled reason for visit: Education    Caregiver involvement: girlfriend.    Medications reviewed and all medications are available in the home this visit.    The following education was provided regarding medications:  Cotine to take medication  as ordered.    MD notified of any discrepancies/look a-like medications/medication interactions: n/a  Medications are effective at this time.      Home health supplies by type and quantity ordered/delivered this visit include: n/a    Patient education provided this visit: See Interventions    Sharps education provided: Clinician instructed patient/CG on proper disposal of sharps: Containers should be made of hard plastic, be puncture-resistant and leakproof, such as a laundry detergent or bleach bottle.  When the container is ¾ full, it should be sealed with tape and labeled DO NOT RECYCLE prior to discarding in the regular trash.        Patient level of understanding of education provided: Alert and Orientated    Patient response to procedure performed:  n/a    Agency Progress toward goals: Continued Education    Patient's Progress towards personal goals: Patient states he feels fine    Home exercise program. Fall Prevention    Continued need for the following skills: Nursing.    Plan for next visit: Education    Patient and/or caregiver notified and agrees to changes in the Plan of Care: Yes.     The following discharge planning was discussed with the pt/caregiver: Plan to discharge once goals are met

## 2024-04-15 ENCOUNTER — HOSPITAL ENCOUNTER (OUTPATIENT)
Facility: HOSPITAL | Age: 69
Discharge: HOME | End: 2024-04-18
Attending: STUDENT IN AN ORGANIZED HEALTH CARE EDUCATION/TRAINING PROGRAM
Payer: MEDICARE

## 2024-04-15 DIAGNOSIS — R29.898 WEAKNESS OF RIGHT UPPER EXTREMITY: ICD-10-CM

## 2024-04-15 DIAGNOSIS — I69.351 HEMIPARESIS AFFECTING RIGHT SIDE AS LATE EFFECT OF STROKE (HCC): ICD-10-CM

## 2024-04-15 DIAGNOSIS — R29.898 WEAKNESS OF RIGHT LOWER EXTREMITY: ICD-10-CM

## 2024-04-15 DIAGNOSIS — Z86.73 HISTORY OF CVA (CEREBROVASCULAR ACCIDENT): ICD-10-CM

## 2024-04-15 DIAGNOSIS — R53.1 WEAKNESS: ICD-10-CM

## 2024-04-15 LAB — CREAT UR-MCNC: 1.5 MG/DL (ref 0.6–1.3)

## 2024-04-15 PROCEDURE — 82565 ASSAY OF CREATININE: CPT

## 2024-04-15 PROCEDURE — 70496 CT ANGIOGRAPHY HEAD: CPT

## 2024-04-15 PROCEDURE — 6360000004 HC RX CONTRAST MEDICATION: Performed by: STUDENT IN AN ORGANIZED HEALTH CARE EDUCATION/TRAINING PROGRAM

## 2024-04-15 PROCEDURE — 70450 CT HEAD/BRAIN W/O DYE: CPT

## 2024-04-15 RX ORDER — FLUTICASONE FUROATE 100 UG/1
2 POWDER RESPIRATORY (INHALATION) 2 TIMES DAILY
Qty: 3 EACH | Refills: 2 | Status: SHIPPED | OUTPATIENT
Start: 2024-04-15

## 2024-04-15 RX ADMIN — IOPAMIDOL 70 ML: 755 INJECTION, SOLUTION INTRAVENOUS at 07:42

## 2024-04-17 ENCOUNTER — HOME CARE VISIT (OUTPATIENT)
Age: 69
End: 2024-04-17
Payer: MEDICARE

## 2024-04-18 ENCOUNTER — HOME CARE VISIT (OUTPATIENT)
Age: 69
End: 2024-04-18
Payer: MEDICARE

## 2024-04-23 ENCOUNTER — HOME CARE VISIT (OUTPATIENT)
Age: 69
End: 2024-04-23
Payer: MEDICARE

## 2024-04-23 PROCEDURE — G0152 HHCP-SERV OF OT,EA 15 MIN: HCPCS

## 2024-04-23 NOTE — HOME HEALTH
Caregiver involvement: Gina (spouse) lives with pt and provides daily emotional support.    Medications reviewed and all medications are available in the home this visit.    The following education was provided regarding medications, medication interactions, and look alike medications (specify): Continue as directed by MD.    Medications  are effective at this time.      Patient education provided this visit: see ADL note    Sharps education provided:  na    Patient level of understanding of education provided: see ADL note    Skilled Care Performed this visit: Completed OT evaluation and assessment for safety with ADL and mobility.    Modified Barthel ADL Index  Bowels              ( ) 0 = Incontinent or needs enemas              ( ) 1 = Occasional Accident              (x ) 2 = Continent  Bladder               ( ) 0 = Incontinent              ( ) 1 = Occasional accident (1x/wk)              (x ) 2 = Continent  Grooming               ( ) 0 = Needs help with personal care              (x ) 1 = Independent (including face, hair, teeth, shaving)  Toilet Use               ( ) 0 = Dependent              ( ) 1 = Needs some help              (x ) 2 = Independent  Feeding              ( ) 0 = Unable              ( ) 1 = Needs help, e.g. cutting              (x ) 2 = Independent  Transfers   (bed to chair and back)              ( ) 0 = Unable, no sitting balance              ( ) 1 = Major help (1 or 2 people), can sit              ( ) 2 = Minor help (verbal or physical)              (x ) 3 = Independent  Mobility              ( ) 0 = Immobile              ( ) 1 = Wheelchair independent (including corners)              ( ) 2 = Walks with the help of 1 person (physical or verbal help)              (x ) 3 = Independent (may use aid)  Dressing               ( ) 0 = Unable              ( ) 1 = Needs help; can do ~1/2 unaided              (x ) 2 = Independent  Stairs              ( ) 0 = Unable              ( ) 1 = Needs help

## 2024-04-24 VITALS
RESPIRATION RATE: 17 BRPM | DIASTOLIC BLOOD PRESSURE: 60 MMHG | HEART RATE: 68 BPM | OXYGEN SATURATION: 93 % | TEMPERATURE: 98.7 F | SYSTOLIC BLOOD PRESSURE: 118 MMHG

## 2024-04-24 NOTE — CASE COMMUNICATION
Occupational Therapy Evaluation    1w1    Mr. Looney is ambulating in his home without use of an AD but reports that he uses his SC with community mobility.  He has a tub/shower combo with a shower seat and is able to transfer into the shower unassisted.  While seated, he is able to wash self and reach all areas.  He denies having SOB or difficulty maintaining functional grasp of items with the R hand.  Mr. Looney reports that he feels  his R side strength has returned 100% over the past 2 weeks.  He is able to dress self and manipulate clothing fasteners unassisted.  He is using a standard toilet seat and is able to perform sit <> stand independently and completes his own hygiene/clothing management.  Today, pt was able to walk to kitchen to get his breakfast, open containers and bring back to living room independently.  Pt denies needing assistance with daily routine .  No further skilled OT is indicated at this time as pt has returned to independence and baseline.  Pt agreeable for OT d/c.

## 2024-04-26 ENCOUNTER — HOME CARE VISIT (OUTPATIENT)
Age: 69
End: 2024-04-26
Payer: MEDICARE

## 2024-04-29 ASSESSMENT — ENCOUNTER SYMPTOMS: DYSPNEA ACTIVITY LEVEL: AFTER AMBULATING 10 - 20 FT

## 2024-05-15 ENCOUNTER — OFFICE VISIT (OUTPATIENT)
Age: 69
End: 2024-05-15
Payer: MEDICARE

## 2024-05-15 VITALS
DIASTOLIC BLOOD PRESSURE: 62 MMHG | RESPIRATION RATE: 16 BRPM | HEIGHT: 70 IN | BODY MASS INDEX: 25.2 KG/M2 | TEMPERATURE: 97.1 F | WEIGHT: 176 LBS | HEART RATE: 74 BPM | SYSTOLIC BLOOD PRESSURE: 131 MMHG | OXYGEN SATURATION: 98 %

## 2024-05-15 DIAGNOSIS — R29.818 SUSPECTED SLEEP APNEA: ICD-10-CM

## 2024-05-15 DIAGNOSIS — G47.19 EXCESSIVE DAYTIME SLEEPINESS: ICD-10-CM

## 2024-05-15 DIAGNOSIS — G25.81 RESTLESS LEGS SYNDROME: ICD-10-CM

## 2024-05-15 DIAGNOSIS — G47.10 HYPERSOMNIA, UNSPECIFIED: ICD-10-CM

## 2024-05-15 DIAGNOSIS — R06.81 WITNESSED APNEIC SPELLS: ICD-10-CM

## 2024-05-15 DIAGNOSIS — R06.83 SNORING: Primary | ICD-10-CM

## 2024-05-15 DIAGNOSIS — Z72.821 INADEQUATE SLEEP HYGIENE: ICD-10-CM

## 2024-05-15 DIAGNOSIS — Z86.73 PERSONAL HISTORY OF TRANSIENT ISCHEMIC ATTACK (TIA), AND CEREBRAL INFARCTION WITHOUT RESIDUAL DEFICITS: ICD-10-CM

## 2024-05-15 DIAGNOSIS — I10 ESSENTIAL (PRIMARY) HYPERTENSION: ICD-10-CM

## 2024-05-15 PROCEDURE — 4004F PT TOBACCO SCREEN RCVD TLK: CPT | Performed by: OTOLARYNGOLOGY

## 2024-05-15 PROCEDURE — 3078F DIAST BP <80 MM HG: CPT | Performed by: OTOLARYNGOLOGY

## 2024-05-15 PROCEDURE — G8427 DOCREV CUR MEDS BY ELIG CLIN: HCPCS | Performed by: OTOLARYNGOLOGY

## 2024-05-15 PROCEDURE — 1123F ACP DISCUSS/DSCN MKR DOCD: CPT | Performed by: OTOLARYNGOLOGY

## 2024-05-15 PROCEDURE — 3075F SYST BP GE 130 - 139MM HG: CPT | Performed by: OTOLARYNGOLOGY

## 2024-05-15 PROCEDURE — G8417 CALC BMI ABV UP PARAM F/U: HCPCS | Performed by: OTOLARYNGOLOGY

## 2024-05-15 PROCEDURE — 99204 OFFICE O/P NEW MOD 45 MIN: CPT | Performed by: OTOLARYNGOLOGY

## 2024-05-15 PROCEDURE — 3017F COLORECTAL CA SCREEN DOC REV: CPT | Performed by: OTOLARYNGOLOGY

## 2024-05-15 ASSESSMENT — PATIENT HEALTH QUESTIONNAIRE - PHQ9
2. FEELING DOWN, DEPRESSED OR HOPELESS: NOT AT ALL
SUM OF ALL RESPONSES TO PHQ QUESTIONS 1-9: 0
SUM OF ALL RESPONSES TO PHQ9 QUESTIONS 1 & 2: 0
1. LITTLE INTEREST OR PLEASURE IN DOING THINGS: NOT AT ALL
SUM OF ALL RESPONSES TO PHQ QUESTIONS 1-9: 0

## 2024-05-15 ASSESSMENT — SLEEP AND FATIGUE QUESTIONNAIRES
HOW LIKELY ARE YOU TO NOD OFF OR FALL ASLEEP WHILE WATCHING TV: MODERATE CHANCE OF DOZING
HOW LIKELY ARE YOU TO NOD OFF OR FALL ASLEEP IN A CAR, WHILE STOPPED FOR A FEW MINUTES IN TRAFFIC: WOULD NEVER DOZE
ESS TOTAL SCORE: 19
HOW LIKELY ARE YOU TO NOD OFF OR FALL ASLEEP WHILE LYING DOWN TO REST IN THE AFTERNOON WHEN CIRCUMSTANCES PERMIT: HIGH CHANCE OF DOZING
HOW LIKELY ARE YOU TO NOD OFF OR FALL ASLEEP WHILE SITTING QUIETLY AFTER LUNCH WITHOUT ALCOHOL: HIGH CHANCE OF DOZING
HOW LIKELY ARE YOU TO NOD OFF OR FALL ASLEEP WHEN YOU ARE A PASSENGER IN A CAR FOR AN HOUR WITHOUT A BREAK: MODERATE CHANCE OF DOZING
HOW LIKELY ARE YOU TO NOD OFF OR FALL ASLEEP WHILE SITTING AND TALKING TO SOMEONE: HIGH CHANCE OF DOZING
NECK CIRCUMFERENCE (INCHES): 14
HOW LIKELY ARE YOU TO NOD OFF OR FALL ASLEEP WHILE SITTING INACTIVE IN A PUBLIC PLACE: HIGH CHANCE OF DOZING
HOW LIKELY ARE YOU TO NOD OFF OR FALL ASLEEP WHILE SITTING AND READING: HIGH CHANCE OF DOZING

## 2024-05-15 NOTE — PATIENT INSTRUCTIONS
Please make a follow up appointment to discuss the results of your sleep study. If this is impossible for some reason, please send me a \"My Chart\" message so that I may get back with you in a timely manner.    The [facility] is located in the [place], adjacent to [facility]. The lab is on the second floor. The direct number to call for sleep study related questions is: [phone].    Please call our clinic back at [phone] or send a message on Zafin if you have any questions or concerns or if you are experiencing any of the following:     You have not received a follow up appointment within 30 days prior the recommended follow up time.    If you are not tolerating treatment plan and/or not able to obtain equipment or prescribed medication(s).  if you are experiencing any difficulties with the Durable Medical Equipment  (DME) Company you may be using or is assigned to you.  Two weeks have passed and you have not received an appointment for a scheduled procedure.  Two weeks have passed since you underwent a test and/or procedure and you have not received your results.     If you are using a CPAP/BIPAP, or Home Ventilator Device- Please note the following.  Currently, many DMEs are experiencing supply chain difficulties and orders for equipment may be back logged several weeks.     Your  Durable Medical Equipment (DME ) company is supposed to provide you with replacement filters, tubing and masks. You can either call your DME when you need new supplies or you can arrange for an automatic shipment schedule.    Your need to be seen by our office at lat minimum of every 12 months in order to renew the prescription for these supplies.   Please make note of who your DME company is and their phone number.   Please make sure that you clean your mask and hosing on a regular basis.  Your DME can provide you with additional information regarding proper care and cleaning of your

## 2024-05-15 NOTE — PROGRESS NOTES
[facility]   Pulmonary, Critical Care, and Sleep Medicine      Office Progress Note         Primary Care Physician: Eloisa Cheek DO       Reason for Visit: f/u Obstructive Sleep Apnea symptoms        Assessment:  1. Snoring  -     PAT - Home Sleep Test; Future  2. Suspected sleep apnea  -     PAT - Home Sleep Test; Future  3. Witnessed apneic spells  4. Excessive daytime sleepiness  -     PAT - Home Sleep Test; Future  5. Inadequate sleep hygiene  6. Essential (primary) hypertension  Assessment & Plan:   Well-controlled, continue current medications  7. Personal history of transient ischemic attack (TIA), and cerebral infarction without residual deficits  8. Restless legs syndrome  Assessment & Plan:    Possible, will need iron studies in follow up. No recent ferritin level drawn that I can see in his records.  Also could be made worse by uncontrolled obstructive sleep apnea, medications, caffeine etc.  9. Hypersomnia, unspecified  -     PAT - Home Sleep Test; Future       I previously saw this patient January 10, 2023 and ordered a PSG.  He did not obtain the PSG but instead is following up for year and a half later with the same symptoms.    Symptoms essentially unchanged in terms of excessive daytime somnolence, loud snoring, witnessed apneas etc.  He also has several chronic medical conditions as outlined above which would make it necessary to screen for obstructive sleep apnea.    We really did not discuss his restless legs today as he gave rather short answers overall.    Follows with Dr. Eloisa Cheek for primary care    I have discussed the nature and definition of obstructive sleep apnea and why it would be important to evaluate for this.  We did discuss how undiagnosed/untreated obstructive sleep apnea can affect other medical conditions/disorders, and in particular, cardiovascular disorders.  The consequences of untreated obstructive sleep apnea include the increased risk of

## 2024-05-15 NOTE — PROGRESS NOTES
Paula Looney Jr. presents today for   Chief Complaint   Patient presents with    Snoring    Sleep Problem       Is someone accompanying this pt? Yes, wife     Is the patient using any DME equipment during OV? no    -DME Company: N/A    Depression Screenin/15/2024    10:25 AM   PHQ-9    Little interest or pleasure in doing things 0   Feeling down, depressed, or hopeless 0   PHQ-2 Score 0   PHQ-9 Total Score 0        [place] Sleepiness Scale:      5/15/2024    10:27 AM   Sleep Medicine   Sitting and reading 3   Watching TV 2   Sitting, inactive in a public place (e.g. a theatre or a meeting) 3   As a passenger in a car for an hour without a break 2   Lying down to rest in the afternoon when circumstances permit 3   Sitting and talking to someone 3   Sitting quietly after a lunch without alcohol 3   In a car, while stopped for a few minutes in traffic 0   [place] Sleepiness Score 19   Neck circumference (Inches) 14       Stop-Ban/15/2024    10:00 AM   STOP-BANG QUESTIONNAIRE   Are you a loud and/or regular snorer? 1   Do you often feel tired or groggy upon awakening or do you awaken with a headache? 1   Have you been observed to gasp or stop breathing during sleep? 1   Are you often tired or fatigued during wake time hours?  0   Do you fall asleep sitting, reading, watching TV or driving? 0   Do you often have problems with memory or concentration? 0   Do you have or are you being treated for high blood pressure? 1   Recent BMI (Calculated) 24.6   Is BMI greater than 35 kg/m2? 0=No   Age older than 50 years old? 1=Yes   Is your neck circumference greater than 17 inches (Male) or 16 inches (Female)? 0   Gender - Male 1=Yes   STOP-Bang Total Score 30.6           Coordination of Care:  1. Have you been to the ER, urgent care clinic since your last visit? Hospitalized since your last visit?  no    2. Have you seen or consulted any other health care providers outside of the [facility]

## 2024-05-15 NOTE — ASSESSMENT & PLAN NOTE
Possible, will need iron studies in follow up. No recent ferritin level drawn that I can see in his records.  Also could be made worse by uncontrolled obstructive sleep apnea, medications, caffeine etc.

## 2024-05-17 DIAGNOSIS — I65.23 BILATERAL CAROTID ARTERY STENOSIS: Primary | ICD-10-CM

## 2024-05-17 RX ORDER — CLOPIDOGREL BISULFATE 75 MG/1
75 TABLET ORAL DAILY
Qty: 90 TABLET | Refills: 0 | Status: SHIPPED | OUTPATIENT
Start: 2024-05-17

## 2024-05-29 ENCOUNTER — TELEPHONE (OUTPATIENT)
Dept: SLEEP MEDICINE | Facility: HOSPITAL | Age: 69
End: 2024-05-29

## 2024-06-28 DIAGNOSIS — E78.2 MIXED HYPERLIPIDEMIA: ICD-10-CM

## 2024-06-28 DIAGNOSIS — G89.29 OTHER CHRONIC PAIN: ICD-10-CM

## 2024-06-28 DIAGNOSIS — I65.23 BILATERAL CAROTID ARTERY STENOSIS: ICD-10-CM

## 2024-07-01 RX ORDER — LISINOPRIL 10 MG/1
10 TABLET ORAL DAILY
Qty: 90 TABLET | Refills: 0 | Status: SHIPPED | OUTPATIENT
Start: 2024-07-01

## 2024-07-01 RX ORDER — CLOPIDOGREL BISULFATE 75 MG/1
75 TABLET ORAL DAILY
Qty: 90 TABLET | Refills: 0 | Status: SHIPPED | OUTPATIENT
Start: 2024-07-01

## 2024-07-01 RX ORDER — ATORVASTATIN CALCIUM 10 MG/1
10 TABLET, FILM COATED ORAL DAILY
Qty: 90 TABLET | Refills: 0 | Status: SHIPPED | OUTPATIENT
Start: 2024-07-01

## 2024-07-11 ENCOUNTER — HOSPITAL ENCOUNTER (OUTPATIENT)
Facility: HOSPITAL | Age: 69
Setting detail: SPECIMEN
Discharge: HOME | End: 2024-07-11
Payer: MEDICARE

## 2024-07-11 ENCOUNTER — OFFICE VISIT (OUTPATIENT)
Facility: CLINIC | Age: 69
End: 2024-07-11
Payer: MEDICARE

## 2024-07-11 ENCOUNTER — OFFICE VISIT (OUTPATIENT)
Age: 69
End: 2024-07-11

## 2024-07-11 VITALS
WEIGHT: 166 LBS | SYSTOLIC BLOOD PRESSURE: 122 MMHG | OXYGEN SATURATION: 99 % | DIASTOLIC BLOOD PRESSURE: 80 MMHG | HEART RATE: 74 BPM | HEIGHT: 70 IN | BODY MASS INDEX: 23.77 KG/M2

## 2024-07-11 VITALS
WEIGHT: 166 LBS | SYSTOLIC BLOOD PRESSURE: 109 MMHG | HEART RATE: 78 BPM | OXYGEN SATURATION: 93 % | RESPIRATION RATE: 18 BRPM | HEIGHT: 70 IN | DIASTOLIC BLOOD PRESSURE: 57 MMHG | BODY MASS INDEX: 23.77 KG/M2 | TEMPERATURE: 98 F

## 2024-07-11 DIAGNOSIS — I67.1 CEREBRAL ANEURYSM: ICD-10-CM

## 2024-07-11 DIAGNOSIS — Z71.6 TOBACCO ABUSE COUNSELING: ICD-10-CM

## 2024-07-11 DIAGNOSIS — G45.9 TIA (TRANSIENT ISCHEMIC ATTACK): ICD-10-CM

## 2024-07-11 DIAGNOSIS — I65.23 BILATERAL CAROTID ARTERY STENOSIS: ICD-10-CM

## 2024-07-11 DIAGNOSIS — F17.200 TOBACCO USE DISORDER: ICD-10-CM

## 2024-07-11 DIAGNOSIS — I10 ESSENTIAL (PRIMARY) HYPERTENSION: Primary | ICD-10-CM

## 2024-07-11 DIAGNOSIS — I65.23 CAROTID STENOSIS, ASYMPTOMATIC, BILATERAL: Primary | ICD-10-CM

## 2024-07-11 DIAGNOSIS — G25.81 RESTLESS LEGS SYNDROME: ICD-10-CM

## 2024-07-11 DIAGNOSIS — R06.83 SNORING: ICD-10-CM

## 2024-07-11 DIAGNOSIS — R79.9 ABNORMAL FINDING OF BLOOD CHEMISTRY, UNSPECIFIED: ICD-10-CM

## 2024-07-11 DIAGNOSIS — E78.2 MIXED HYPERLIPIDEMIA: ICD-10-CM

## 2024-07-11 DIAGNOSIS — E11.49 TYPE 2 DIABETES MELLITUS WITH OTHER DIABETIC NEUROLOGICAL COMPLICATION (HCC): ICD-10-CM

## 2024-07-11 LAB
ALBUMIN SERPL-MCNC: 3.4 G/DL (ref 3.4–5)
ALBUMIN/GLOB SERPL: 0.9 (ref 0.8–1.7)
ALP SERPL-CCNC: 94 U/L (ref 45–117)
ALT SERPL-CCNC: 33 U/L (ref 16–61)
ANION GAP SERPL CALC-SCNC: 6 MMOL/L (ref 3–18)
AST SERPL-CCNC: 23 U/L (ref 10–38)
BILIRUB SERPL-MCNC: 0.3 MG/DL (ref 0.2–1)
BUN SERPL-MCNC: 31 MG/DL (ref 7–18)
BUN/CREAT SERPL: 31 (ref 12–20)
CALCIUM SERPL-MCNC: 9.3 MG/DL (ref 8.5–10.1)
CHLORIDE SERPL-SCNC: 104 MMOL/L (ref 100–111)
CHOLEST SERPL-MCNC: 102 MG/DL
CO2 SERPL-SCNC: 27 MMOL/L (ref 21–32)
CREAT SERPL-MCNC: 1.01 MG/DL (ref 0.6–1.3)
EST. AVERAGE GLUCOSE BLD GHB EST-MCNC: 114 MG/DL
FERRITIN SERPL-MCNC: 119 NG/ML (ref 8–388)
GLOBULIN SER CALC-MCNC: 3.6 G/DL (ref 2–4)
GLUCOSE SERPL-MCNC: 96 MG/DL (ref 74–99)
HBA1C MFR BLD: 5.6 % (ref 4.2–5.6)
HDLC SERPL-MCNC: 44 MG/DL (ref 40–60)
HDLC SERPL: 2.3 (ref 0–5)
IRON SATN MFR SERPL: 23 % (ref 20–50)
IRON SERPL-MCNC: 58 UG/DL (ref 50–175)
LDLC SERPL CALC-MCNC: 37 MG/DL (ref 0–100)
LIPID PANEL: NORMAL
POTASSIUM SERPL-SCNC: 4.3 MMOL/L (ref 3.5–5.5)
PROT SERPL-MCNC: 7 G/DL (ref 6.4–8.2)
SODIUM SERPL-SCNC: 137 MMOL/L (ref 136–145)
TIBC SERPL-MCNC: 250 UG/DL (ref 250–450)
TRIGL SERPL-MCNC: 105 MG/DL
VLDLC SERPL CALC-MCNC: 21 MG/DL

## 2024-07-11 PROCEDURE — 80053 COMPREHEN METABOLIC PANEL: CPT

## 2024-07-11 PROCEDURE — G8420 CALC BMI NORM PARAMETERS: HCPCS | Performed by: STUDENT IN AN ORGANIZED HEALTH CARE EDUCATION/TRAINING PROGRAM

## 2024-07-11 PROCEDURE — 3074F SYST BP LT 130 MM HG: CPT | Performed by: STUDENT IN AN ORGANIZED HEALTH CARE EDUCATION/TRAINING PROGRAM

## 2024-07-11 PROCEDURE — 3078F DIAST BP <80 MM HG: CPT | Performed by: STUDENT IN AN ORGANIZED HEALTH CARE EDUCATION/TRAINING PROGRAM

## 2024-07-11 PROCEDURE — 80061 LIPID PANEL: CPT

## 2024-07-11 PROCEDURE — 83540 ASSAY OF IRON: CPT

## 2024-07-11 PROCEDURE — 3046F HEMOGLOBIN A1C LEVEL >9.0%: CPT | Performed by: STUDENT IN AN ORGANIZED HEALTH CARE EDUCATION/TRAINING PROGRAM

## 2024-07-11 PROCEDURE — 4004F PT TOBACCO SCREEN RCVD TLK: CPT | Performed by: STUDENT IN AN ORGANIZED HEALTH CARE EDUCATION/TRAINING PROGRAM

## 2024-07-11 PROCEDURE — 3017F COLORECTAL CA SCREEN DOC REV: CPT | Performed by: STUDENT IN AN ORGANIZED HEALTH CARE EDUCATION/TRAINING PROGRAM

## 2024-07-11 PROCEDURE — 2022F DILAT RTA XM EVC RTNOPTHY: CPT | Performed by: STUDENT IN AN ORGANIZED HEALTH CARE EDUCATION/TRAINING PROGRAM

## 2024-07-11 PROCEDURE — G8427 DOCREV CUR MEDS BY ELIG CLIN: HCPCS | Performed by: STUDENT IN AN ORGANIZED HEALTH CARE EDUCATION/TRAINING PROGRAM

## 2024-07-11 PROCEDURE — G2211 COMPLEX E/M VISIT ADD ON: HCPCS | Performed by: STUDENT IN AN ORGANIZED HEALTH CARE EDUCATION/TRAINING PROGRAM

## 2024-07-11 PROCEDURE — 36415 COLL VENOUS BLD VENIPUNCTURE: CPT

## 2024-07-11 PROCEDURE — 99214 OFFICE O/P EST MOD 30 MIN: CPT | Performed by: STUDENT IN AN ORGANIZED HEALTH CARE EDUCATION/TRAINING PROGRAM

## 2024-07-11 PROCEDURE — 82728 ASSAY OF FERRITIN: CPT

## 2024-07-11 PROCEDURE — 83550 IRON BINDING TEST: CPT

## 2024-07-11 PROCEDURE — 1123F ACP DISCUSS/DSCN MKR DOCD: CPT | Performed by: STUDENT IN AN ORGANIZED HEALTH CARE EDUCATION/TRAINING PROGRAM

## 2024-07-11 PROCEDURE — 83036 HEMOGLOBIN GLYCOSYLATED A1C: CPT

## 2024-07-11 ASSESSMENT — PATIENT HEALTH QUESTIONNAIRE - PHQ9
SUM OF ALL RESPONSES TO PHQ QUESTIONS 1-9: 0
SUM OF ALL RESPONSES TO PHQ9 QUESTIONS 1 & 2: 0
SUM OF ALL RESPONSES TO PHQ QUESTIONS 1-9: 0
1. LITTLE INTEREST OR PLEASURE IN DOING THINGS: NOT AT ALL
2. FEELING DOWN, DEPRESSED OR HOPELESS: NOT AT ALL
SUM OF ALL RESPONSES TO PHQ QUESTIONS 1-9: 0
SUM OF ALL RESPONSES TO PHQ QUESTIONS 1-9: 0

## 2024-07-11 ASSESSMENT — ENCOUNTER SYMPTOMS
SHORTNESS OF BREATH: 0
ABDOMINAL PAIN: 0

## 2024-07-11 NOTE — PROGRESS NOTES
\"Have you been to the ER, urgent care clinic since your last visit?  Hospitalized since your last visit?\"    NO    “Have you seen or consulted any other health care providers outside of [facility] since your last visit?”    NO    “Have you had a colorectal cancer screening such as a colonoscopy/FIT/Cologuard?    YES - Type: Colonoscopy - Where: [facility] Nurse/CMA to request most recent records if not in the chart     Date of last Colonoscopy: 6/20/2019  No cologuard on file  No FIT/FOBT on file   No flexible sigmoidoscopy on file

## 2024-07-11 NOTE — PROGRESS NOTES
[facility]    Vascular Surgery Office Visit    Paula Looney .  Chief Complaint   Patient presents with    Carotid Disease     Referred by PCP       History:  69 y.o. male referred for evaluation of carotid artery stenosis. He is a bit of a poor historian and much of the history was obtained from the chart. He is a current smoker and has DM2. It seems he experienced an episode of R arm/leg weakness and a facial droop in April 2024. He did not seek ER eval at that time. No other episodes. He is also undergoing evaluations for possible sleep apnea. No claudication or rest pain. No hx of MI.       Physical Exam:    /80   Pulse 74   Ht 1.778 m (5' 10\")   Wt 75.3 kg (166 lb)   SpO2 99%   BMI 23.82 kg/m²      Constitutional:  Patient is well developed, well nourished, and not distressed.   HEENT: Atraumatic, normocephalic.   Eyes:   Cunjunctivae clear, no scleral icterus  Cardiovascular:  Normal rate, regular rhythm, normal heart sounds.  Pulmonary/Chest: Effort normal and breath sounds normal.  he has no wheezes or no rales.   Abdominal:   Soft, non-distended. No tenderness to palpation.   Extremities: BLE warm.   Neurological:  he  is alert and oriented x3. Motor & sensory grossly intact in all 4 limbs.   Psych: Appropriate mood and affect.     Imaging/Studies:   CTA head/neck May 2024: L ICA 70% stenosis. R ICA 30-50% stenosis. L VA stenosis. R MCA dilation of 4.9mm.       Impression:  Potentially symptomatic high grade L ICA stenosis and mild R ICA stenosis.   R MCA dilation 4.9mm  Current smoker  DM2    Plan:  Recommend R CEA or TCAR  Cardiology preop evaluation (referral placed)  MRI brain to eval for prior CVA  Evaluation by Dr. Mon (Neurosurgeon) for recommendations regarding MCA dilation. This eval will need to take place prior to R ICA intervention is performed.   Continue ASA, plavix and statin  Strongly advised complete smoking cessation.       Samira Izquierdo

## 2024-07-11 NOTE — PROGRESS NOTES
HISTORY OF PRESENT ILLNESS  Paula Looney Jr. is a 69 y.o. male presenting today for   Chief Complaint   Patient presents with    Hypertension    Cholesterol Problem    Diabetes        Presents today with his wife who helps present HPI:    HTN- Taking lisinopril 10mg daily. Denies changes in vision hearing or headaches     T2D- Diet controlled. .      HLD- Taking lipitor 10mg daily. Denies myalgias     Snoring-Reestablished with sleep medicine. Home study was ordered but has yet to complete this.     Artery stenosis-  Found on recent CTA head in May. Has upcoming visit with vascular tomorrow to discuss further management. Has started ASA 81mg and Plavix 75mg daily.       Medications reviewed and updated.    Review of Systems   Eyes:  Negative for visual disturbance.   Respiratory:  Negative for shortness of breath.    Cardiovascular:  Negative for chest pain.   Gastrointestinal:  Negative for abdominal pain.   Neurological:  Negative for headaches.         BP (!) 109/57   Pulse 78   Temp 98 °F (36.7 °C) (Skin)   Resp 18   Ht 1.778 m (5' 10\")   Wt 75.3 kg (166 lb)   SpO2 93%   BMI 23.82 kg/m²     Physical Exam  HENT:      Mouth/Throat:      Comments: MASK  Eyes:      Pupils: Pupils are equal, round, and reactive to light.   Cardiovascular:      Rate and Rhythm: Normal rate and regular rhythm.   Pulmonary:      Effort: Pulmonary effort is normal.      Breath sounds: Normal breath sounds.   Musculoskeletal:      Right lower leg: No edema.      Left lower leg: No edema.   Psychiatric:         Mood and Affect: Mood normal.         ASSESSMENT and PLAN    ICD-10-CM    1. Essential (primary) hypertension  I10       2. Type 2 diabetes mellitus with other diabetic neurological complication (HCC)  E11.49 Comprehensive Metabolic Panel     Hemoglobin A1C     Microalbumin / Creatinine Urine Ratio      3. Bilateral carotid artery stenosis  I65.23       4. Mixed hyperlipidemia  E78.2 Lipid Panel      5. Snoring  R06.83

## 2024-07-12 ENCOUNTER — TELEPHONE (OUTPATIENT)
Age: 69
End: 2024-07-12

## 2024-07-12 NOTE — TELEPHONE ENCOUNTER
Called and tried to call patient to give him his appointment date and time. Unable to reach patient at any number listed in his chart.

## 2024-07-12 NOTE — TELEPHONE ENCOUNTER
----- Message from Dariana Marcial sent at 7/12/2024 10:21 AM EDT -----  Regarding: RE: cardiac clearance  If you can that would be great. I did tell the wife yesterday that someone will be calling from your office. Thank you.     ----- Message -----  From: Jacqueline Kelley MA  Sent: 7/12/2024  10:07 AM EDT  To: Dariana Marcial  Subject: RE: cardiac clearance                            I did not call the patient, do I need to?  ----- Message -----  From: Dariana Marcial  Sent: 7/12/2024  10:03 AM EDT  To: Jacqueline Kelley MA  Subject: RE: cardiac clearance                            Thank you. I will fax clearance before appointment. Did patient confirm?     ----- Message -----  From: Jacqueline Kelley MA  Sent: 7/12/2024   9:41 AM EDT  To: Dariana Marcial  Subject: RE: cardiac clearance                            Hello,    I have patient scheduled for 8/14/24 @ 8:30 in the [place] location at [address].     Thanks,    Jacqueline   ----- Message -----  From: Dariana Marcial  Sent: 7/11/2024   4:32 PM EDT  To: Jacqueline Kelley MA  Subject: cardiac clearance                                Surinder Phillips,    Need clearance for this patient for a Left TCAR possible L CEA with Dr. Sullivan. Maybe he can see PA first to get established then follow up with the doc. Trying to schedule him for September 6 or sooner.     If you can please assist.     Thank you,  Dariana

## 2024-07-17 ENCOUNTER — TELEPHONE (OUTPATIENT)
Facility: CLINIC | Age: 69
End: 2024-07-17

## 2024-07-29 DIAGNOSIS — R06.00 DYSPNEA, UNSPECIFIED: ICD-10-CM

## 2024-07-31 RX ORDER — ALBUTEROL SULFATE 90 UG/1
AEROSOL, METERED RESPIRATORY (INHALATION)
Qty: 17 EACH | Refills: 2 | Status: SHIPPED | OUTPATIENT
Start: 2024-07-31

## 2024-08-12 DIAGNOSIS — M17.11 PRIMARY OSTEOARTHRITIS OF RIGHT KNEE: ICD-10-CM

## 2024-08-12 DIAGNOSIS — I65.23 BILATERAL CAROTID ARTERY STENOSIS: ICD-10-CM

## 2024-08-12 DIAGNOSIS — M17.12 PRIMARY OSTEOARTHRITIS OF LEFT KNEE: ICD-10-CM

## 2024-08-12 DIAGNOSIS — G89.29 OTHER CHRONIC PAIN: ICD-10-CM

## 2024-08-12 DIAGNOSIS — R06.00 DYSPNEA, UNSPECIFIED: ICD-10-CM

## 2024-08-12 RX ORDER — CETIRIZINE HYDROCHLORIDE 10 MG/1
10 TABLET ORAL DAILY
Qty: 90 TABLET | Refills: 0 | Status: SHIPPED | OUTPATIENT
Start: 2024-08-12

## 2024-08-12 RX ORDER — CLOPIDOGREL BISULFATE 75 MG/1
75 TABLET ORAL DAILY
Qty: 90 TABLET | Refills: 0 | Status: SHIPPED | OUTPATIENT
Start: 2024-08-12

## 2024-08-12 RX ORDER — ALBUTEROL SULFATE 90 UG/1
1 AEROSOL, METERED RESPIRATORY (INHALATION) EVERY 4 HOURS PRN
Qty: 17 EACH | Refills: 2 | Status: SHIPPED | OUTPATIENT
Start: 2024-08-12

## 2024-08-12 RX ORDER — TIZANIDINE 2 MG/1
TABLET ORAL
Qty: 90 TABLET | Refills: 0 | Status: SHIPPED | OUTPATIENT
Start: 2024-08-12

## 2024-08-12 RX ORDER — TRIAMCINOLONE ACETONIDE 0.25 MG/G
CREAM TOPICAL 2 TIMES DAILY
Qty: 15 G | Refills: 1 | Status: SHIPPED | OUTPATIENT
Start: 2024-08-12

## 2024-08-12 RX ORDER — FLUTICASONE FUROATE 100 UG/1
2 POWDER RESPIRATORY (INHALATION) 2 TIMES DAILY
Qty: 3 EACH | Refills: 2 | Status: SHIPPED | OUTPATIENT
Start: 2024-08-12

## 2024-08-12 RX ORDER — LISINOPRIL 10 MG/1
10 TABLET ORAL DAILY
Qty: 90 TABLET | Refills: 0 | Status: SHIPPED | OUTPATIENT
Start: 2024-08-12

## 2024-08-12 NOTE — TELEPHONE ENCOUNTER
Patient's wife is calling in for a ne Rx for all his medications to be sent to his new pharmacy.  Patient is no longer using [facility], needs all medication sent to the [facility] on file.      albuterol sulfate HFA     fluticasone (ARNUITY ELLIPTA) 100 MCG/ACT AEPB [[id number]]     atorvastatin (LIPITOR) 10 MG tablet [[id number]       cetirizine (ZYRTEC) 10 MG tablet [[id number]]       clopidogrel (PLAVIX) 75 MG tablet [[id number]]       diclofenac sodium (VOLTAREN) 1 % GEL [[id number]       lisinopril (PRINIVIL;ZESTRIL) 10 MG tablet [[id number]]       methocarbamol (ROBAXIN) 500 MG tablet [[id number]]       tiZANidine (ZANAFLEX) 2 MG tablet [[id number]]     triamcinolone (KENALOG) 0.025 % cream [[id number]]     ASA 81mg EC tabs    Last Appointment:  7/11/2024  Future Appointments   Date Time Provider Department Center   8/14/2024  8:30 AM Ac Santos MD [facility]

## 2024-08-14 ENCOUNTER — OFFICE VISIT (OUTPATIENT)
Age: 69
End: 2024-08-14

## 2024-08-14 VITALS
SYSTOLIC BLOOD PRESSURE: 134 MMHG | BODY MASS INDEX: 24.05 KG/M2 | WEIGHT: 168 LBS | DIASTOLIC BLOOD PRESSURE: 72 MMHG | OXYGEN SATURATION: 97 % | HEART RATE: 70 BPM | HEIGHT: 70 IN

## 2024-08-14 DIAGNOSIS — E78.2 MIXED HYPERLIPIDEMIA: Primary | ICD-10-CM

## 2024-08-14 DIAGNOSIS — Z72.0 TOBACCO ABUSE: ICD-10-CM

## 2024-08-14 DIAGNOSIS — I10 HYPERTENSION, UNSPECIFIED TYPE: ICD-10-CM

## 2024-08-14 DIAGNOSIS — Z01.818 PREOPERATIVE CLEARANCE: ICD-10-CM

## 2024-08-14 RX ORDER — ATORVASTATIN CALCIUM 40 MG/1
40 TABLET, FILM COATED ORAL DAILY
Qty: 30 TABLET | Refills: 5 | Status: SHIPPED | OUTPATIENT
Start: 2024-08-14

## 2024-08-14 ASSESSMENT — ENCOUNTER SYMPTOMS
BLOOD IN STOOL: 0
COLOR CHANGE: 0
SHORTNESS OF BREATH: 0
CHEST TIGHTNESS: 0
NAUSEA: 0
COUGH: 0
DIARRHEA: 0
ABDOMINAL PAIN: 0
WHEEZING: 0
APNEA: 0
CONSTIPATION: 0

## 2024-08-14 NOTE — PATIENT INSTRUCTIONS
Learning About the Mediterranean Diet  What is the Mediterranean diet?     The Mediterranean diet is a style of eating rather than a diet plan. It features foods eaten in [place], [place], southern [place] and [place], and other countries along the [place] Sea. It emphasizes eating foods like fish, fruits, vegetables, beans, high-fiber breads and whole grains, nuts, and olive oil. This style of eating includes limited red meat, cheese, and sweets.  Why choose the Mediterranean diet?  A Mediterranean-style diet may improve heart health. It contains more fat than other heart-healthy diets. But the fats are mainly from nuts, unsaturated oils (such as fish oils and olive oil), and certain nut or seed oils (such as canola, soybean, or flaxseed oil). These fats may help protect the heart and blood vessels.  How can you get started on the Mediterranean diet?  Here are some things you can do to switch to a more Mediterranean way of eating.  What to eat  Eat a variety of fruits and vegetables each day, such as grapes, blueberries, tomatoes, broccoli, peppers, figs, olives, spinach, eggplant, beans, lentils, and chickpeas.  Eat a variety of whole-grain foods each day, such as oats, brown rice, and whole wheat bread, pasta, and couscous.  Eat fish at least 2 times a week. Try tuna, salmon, mackerel, lake trout, herring, or sardines.  Eat moderate amounts of low-fat dairy products, such as milk, cheese, or yogurt.  Eat moderate amounts of poultry and eggs.  Choose healthy (unsaturated) fats, such as nuts, olive oil, and certain nut or seed oils like canola, soybean, and flaxseed.  Limit unhealthy (saturated) fats, such as butter, palm oil, and coconut oil. And limit fats found in animal products, such as meat and dairy products made with whole milk. Try to eat red meat only a few times a month in very small amounts.  Limit sweets and desserts to only a few times a week. This includes sugar-sweetened drinks like soda.  The

## 2024-08-14 NOTE — PROGRESS NOTES
Have you had Fatigue?  No     2.   Have you had have you had Chest Pain? No   3.   Have you had Dyspnea (SOB) ? No   4.   Have you had Orthopnea? No     5.   Have you had PND? No   6.   Have you had leg swelling? No   7.    Have you had any weight gain? No     8. Have you had any palpitations? No      9. Have you had any syncope? No   10. Do you have any wounds on legs?no  
risk for surgery due to presence of DM vascular disease with vascular surgery though has adequate METS and denies anginal equivalent may proceed without further testing if willing to accept risks and benefits to surgery.       Coronary risk equivalent DM - Continue with aspirin 81mg po daily, Continue with lipitor but change to 40mg po daily for pleiotropic antiinflammatory effects.  CMP 7/2024 AST ALT wnl.      HTN - Stage I pressure systolic, Continue with lisinopril 10mg po daily, Continue trend.      Right MCA dilitaiton - Patient is due to followup with Dr. Mon Neurosurgeon regarding this.  Followup recommendations for plavix, but from cardiac perspective recommend monotherapy antiplatelet unless instructed otherwise.      Tobacco Abuse - Counseled against using tobacco and is using volition to try to stop.

## 2024-08-15 ENCOUNTER — TELEPHONE (OUTPATIENT)
Age: 69
End: 2024-08-15

## 2024-08-15 NOTE — TELEPHONE ENCOUNTER
Called and spoke to patients wife and informed her that I am looking at dates for patient to have surgery with Dr. Sullivan for possible Left TCAR or Left CEA. Will call her back once I have a date. Spouse confirmed.

## 2024-08-22 ENCOUNTER — PREP FOR PROCEDURE (OUTPATIENT)
Age: 69
End: 2024-08-22

## 2024-08-22 DIAGNOSIS — I65.23 BILATERAL CAROTID ARTERY STENOSIS: ICD-10-CM

## 2024-08-22 DIAGNOSIS — I67.1 CEREBRAL ANEURYSM, NONRUPTURED: ICD-10-CM

## 2024-08-22 DIAGNOSIS — G45.9 TIA (TRANSIENT ISCHEMIC ATTACK): ICD-10-CM

## 2024-09-24 ENCOUNTER — TELEPHONE (OUTPATIENT)
Age: 69
End: 2024-09-24

## 2024-09-25 ENCOUNTER — TELEPHONE (OUTPATIENT)
Age: 69
End: 2024-09-25

## 2024-09-25 DIAGNOSIS — Z86.59 HISTORY OF CLAUSTROPHOBIA: Primary | ICD-10-CM

## 2024-09-25 RX ORDER — DIAZEPAM 5 MG
5 TABLET ORAL ONCE
Qty: 1 TABLET | Refills: 0 | Status: SHIPPED | OUTPATIENT
Start: 2024-09-25 | End: 2024-09-25

## 2024-09-30 ENCOUNTER — TELEPHONE (OUTPATIENT)
Age: 69
End: 2024-09-30

## 2024-09-30 NOTE — TELEPHONE ENCOUNTER
Called and spoke to patients spouse to let her know that Valium was sent to the pharmacy on file in preparation for the MRI scheduled on October 3. She stated that patient did not want to have the MRI done and does not want to move forward with the surgery (Left TCAR). Offered for patient to come to the office to rediscuss the surgery and the need for the MRI and she stated that she will let her  know and call back.

## 2024-10-02 ENCOUNTER — TELEPHONE (OUTPATIENT)
Age: 69
End: 2024-10-02

## 2024-10-02 NOTE — TELEPHONE ENCOUNTER
Called and left message at 9:46am on 10/02/2024 to follow up if patient can make it for the MRI that is scheduled on October 3, 2024 at [facility]. Waiting for spouse to call back.

## 2024-10-07 ENCOUNTER — TELEPHONE (OUTPATIENT)
Age: 69
End: 2024-10-07

## 2024-10-07 NOTE — TELEPHONE ENCOUNTER
Called and spoke to patients spouse to check if patient ready to schedule surgery after completing the MRI. Spouse stated that he does not want to move forward for now. He wants to calm down. She will reach out to the office when he is ready.     Patient NO Showed for the MRI Brain that was scheduled on 10/3/2024.

## 2024-11-14 DIAGNOSIS — G89.29 OTHER CHRONIC PAIN: ICD-10-CM

## 2024-11-14 DIAGNOSIS — I65.23 BILATERAL CAROTID ARTERY STENOSIS: ICD-10-CM

## 2024-11-14 RX ORDER — CLOPIDOGREL BISULFATE 75 MG/1
75 TABLET ORAL DAILY
Qty: 90 TABLET | Refills: 0 | Status: SHIPPED | OUTPATIENT
Start: 2024-11-14

## 2024-11-14 RX ORDER — TIZANIDINE 2 MG/1
TABLET ORAL
Qty: 90 TABLET | Refills: 0 | Status: SHIPPED | OUTPATIENT
Start: 2024-11-14

## 2024-11-14 RX ORDER — LISINOPRIL 10 MG/1
10 TABLET ORAL DAILY
Qty: 90 TABLET | Refills: 0 | Status: SHIPPED | OUTPATIENT
Start: 2024-11-14

## 2024-11-14 NOTE — TELEPHONE ENCOUNTER
Last Appointment:  7/11/2024  Future Appointments   Date Time Provider Department Center   1/13/2025 10:30 [facility]   2/14/2025  8:15 AM Ac Santos MD [facility] AMB

## 2024-11-25 ENCOUNTER — TELEPHONE (OUTPATIENT)
Age: 69
End: 2024-11-25

## 2024-11-25 NOTE — TELEPHONE ENCOUNTER
Called and spoke to patients spouse to follow up if patient would want to move forward for surgery, LEFT TCAR with Dr. Sullivan. She stated that patient does not want to move forward. Informed her to call the office if he wants to proceed and will bring him in to see the provider. Spouse confirmed.

## 2024-12-24 RX ORDER — TIZANIDINE 2 MG/1
TABLET ORAL
Qty: 90 TABLET | Refills: 0 | Status: SHIPPED | OUTPATIENT
Start: 2024-12-24

## 2025-01-13 ENCOUNTER — HOSPITAL ENCOUNTER (OUTPATIENT)
Facility: HOSPITAL | Age: 70
Setting detail: SPECIMEN
Discharge: HOME | End: 2025-01-16
Payer: MEDICAID

## 2025-01-13 ENCOUNTER — OFFICE VISIT (OUTPATIENT)
Facility: CLINIC | Age: 70
End: 2025-01-13

## 2025-01-13 VITALS
WEIGHT: 177.8 LBS | RESPIRATION RATE: 14 BRPM | BODY MASS INDEX: 25.45 KG/M2 | HEIGHT: 70 IN | HEART RATE: 73 BPM | TEMPERATURE: 97.2 F | DIASTOLIC BLOOD PRESSURE: 71 MMHG | OXYGEN SATURATION: 95 % | SYSTOLIC BLOOD PRESSURE: 120 MMHG

## 2025-01-13 DIAGNOSIS — I70.0 ATHEROSCLEROSIS OF AORTA (HCC): ICD-10-CM

## 2025-01-13 DIAGNOSIS — M17.11 PRIMARY OSTEOARTHRITIS OF RIGHT KNEE: ICD-10-CM

## 2025-01-13 DIAGNOSIS — G56.03 BILATERAL CARPAL TUNNEL SYNDROME: ICD-10-CM

## 2025-01-13 DIAGNOSIS — M19.041 PRIMARY OSTEOARTHRITIS OF BOTH HANDS: ICD-10-CM

## 2025-01-13 DIAGNOSIS — I73.9 PAD (PERIPHERAL ARTERY DISEASE) (HCC): ICD-10-CM

## 2025-01-13 DIAGNOSIS — I10 ESSENTIAL (PRIMARY) HYPERTENSION: ICD-10-CM

## 2025-01-13 DIAGNOSIS — E11.49 TYPE 2 DIABETES MELLITUS WITH OTHER DIABETIC NEUROLOGICAL COMPLICATION (HCC): ICD-10-CM

## 2025-01-13 DIAGNOSIS — Z71.89 ACP (ADVANCE CARE PLANNING): ICD-10-CM

## 2025-01-13 DIAGNOSIS — Z87.891 PERSONAL HISTORY OF TOBACCO USE: ICD-10-CM

## 2025-01-13 DIAGNOSIS — Z00.00 MEDICARE ANNUAL WELLNESS VISIT, SUBSEQUENT: Primary | ICD-10-CM

## 2025-01-13 DIAGNOSIS — Z13.31 POSITIVE DEPRESSION SCREENING: ICD-10-CM

## 2025-01-13 DIAGNOSIS — Z86.73 HISTORY OF STROKE: ICD-10-CM

## 2025-01-13 DIAGNOSIS — M19.042 PRIMARY OSTEOARTHRITIS OF BOTH HANDS: ICD-10-CM

## 2025-01-13 DIAGNOSIS — Z13.9 ENCOUNTER FOR SCREENING INVOLVING SOCIAL DETERMINANTS OF HEALTH (SDOH): ICD-10-CM

## 2025-01-13 DIAGNOSIS — R26.2 AMBULATORY DYSFUNCTION: ICD-10-CM

## 2025-01-13 DIAGNOSIS — I69.351 HEMIPARESIS AFFECTING RIGHT SIDE AS LATE EFFECT OF STROKE (HCC): ICD-10-CM

## 2025-01-13 LAB
ALBUMIN SERPL-MCNC: 3.7 G/DL (ref 3.4–5)
ALBUMIN/GLOB SERPL: 0.9 (ref 0.8–1.7)
ALP SERPL-CCNC: 91 U/L (ref 45–117)
ALT SERPL-CCNC: 43 U/L (ref 16–61)
ANION GAP SERPL CALC-SCNC: 4 MMOL/L (ref 3–18)
AST SERPL-CCNC: 37 U/L (ref 10–38)
BILIRUB SERPL-MCNC: 0.3 MG/DL (ref 0.2–1)
BUN SERPL-MCNC: 30 MG/DL (ref 7–18)
BUN/CREAT SERPL: 25 (ref 12–20)
CALCIUM SERPL-MCNC: 9.9 MG/DL (ref 8.5–10.1)
CHLORIDE SERPL-SCNC: 101 MMOL/L (ref 100–111)
CHOLEST SERPL-MCNC: 89 MG/DL
CO2 SERPL-SCNC: 29 MMOL/L (ref 21–32)
CREAT SERPL-MCNC: 1.2 MG/DL (ref 0.6–1.3)
EST. AVERAGE GLUCOSE BLD GHB EST-MCNC: 111 MG/DL
GLOBULIN SER CALC-MCNC: 4.2 G/DL (ref 2–4)
GLUCOSE SERPL-MCNC: 82 MG/DL (ref 74–99)
HBA1C MFR BLD: 5.5 % (ref 4.2–5.6)
HDLC SERPL-MCNC: 47 MG/DL (ref 40–60)
HDLC SERPL: 1.9 (ref 0–5)
LDLC SERPL CALC-MCNC: 26.6 MG/DL (ref 0–100)
LIPID PANEL: NORMAL
POTASSIUM SERPL-SCNC: 5.1 MMOL/L (ref 3.5–5.5)
PROT SERPL-MCNC: 7.9 G/DL (ref 6.4–8.2)
SODIUM SERPL-SCNC: 134 MMOL/L (ref 136–145)
TRIGL SERPL-MCNC: 77 MG/DL
VLDLC SERPL CALC-MCNC: 15.4 MG/DL

## 2025-01-13 PROCEDURE — 80061 LIPID PANEL: CPT

## 2025-01-13 PROCEDURE — 83036 HEMOGLOBIN GLYCOSYLATED A1C: CPT

## 2025-01-13 PROCEDURE — 36415 COLL VENOUS BLD VENIPUNCTURE: CPT

## 2025-01-13 PROCEDURE — 80053 COMPREHEN METABOLIC PANEL: CPT

## 2025-01-13 RX ORDER — ATORVASTATIN CALCIUM 40 MG/1
40 TABLET, FILM COATED ORAL DAILY
Qty: 90 TABLET | Refills: 0 | Status: SHIPPED | OUTPATIENT
Start: 2025-01-13

## 2025-01-13 RX ORDER — BLOOD-GLUCOSE METER
EACH MISCELLANEOUS
Qty: 1 KIT | Refills: 0 | Status: SHIPPED | OUTPATIENT
Start: 2025-01-13

## 2025-01-13 RX ORDER — LANCETS 30 GAUGE
1 EACH MISCELLANEOUS 2 TIMES DAILY
Qty: 300 EACH | Refills: 2 | Status: SHIPPED | OUTPATIENT
Start: 2025-01-13

## 2025-01-13 RX ORDER — ASPIRIN 81 MG/1
81 TABLET ORAL DAILY
Qty: 90 TABLET | Refills: 0 | Status: SHIPPED | OUTPATIENT
Start: 2025-01-13

## 2025-01-13 SDOH — ECONOMIC STABILITY: FOOD INSECURITY: WITHIN THE PAST 12 MONTHS, THE FOOD YOU BOUGHT JUST DIDN'T LAST AND YOU DIDN'T HAVE MONEY TO GET MORE.: NEVER TRUE

## 2025-01-13 SDOH — ECONOMIC STABILITY: FOOD INSECURITY: WITHIN THE PAST 12 MONTHS, YOU WORRIED THAT YOUR FOOD WOULD RUN OUT BEFORE YOU GOT MONEY TO BUY MORE.: NEVER TRUE

## 2025-01-13 ASSESSMENT — PATIENT HEALTH QUESTIONNAIRE - PHQ9
8. MOVING OR SPEAKING SO SLOWLY THAT OTHER PEOPLE COULD HAVE NOTICED. OR THE OPPOSITE, BEING SO FIGETY OR RESTLESS THAT YOU HAVE BEEN MOVING AROUND A LOT MORE THAN USUAL: NEARLY EVERY DAY
4. FEELING TIRED OR HAVING LITTLE ENERGY: NEARLY EVERY DAY
5. POOR APPETITE OR OVEREATING: NEARLY EVERY DAY
10. IF YOU CHECKED OFF ANY PROBLEMS, HOW DIFFICULT HAVE THESE PROBLEMS MADE IT FOR YOU TO DO YOUR WORK, TAKE CARE OF THINGS AT HOME, OR GET ALONG WITH OTHER PEOPLE: SOMEWHAT DIFFICULT
1. LITTLE INTEREST OR PLEASURE IN DOING THINGS: NEARLY EVERY DAY
3. TROUBLE FALLING OR STAYING ASLEEP: NEARLY EVERY DAY
9. THOUGHTS THAT YOU WOULD BE BETTER OFF DEAD, OR OF HURTING YOURSELF: NOT AT ALL
7. TROUBLE CONCENTRATING ON THINGS, SUCH AS READING THE NEWSPAPER OR WATCHING TELEVISION: NOT AT ALL
SUM OF ALL RESPONSES TO PHQ9 QUESTIONS 1 & 2: 3
SUM OF ALL RESPONSES TO PHQ QUESTIONS 1-9: 15
2. FEELING DOWN, DEPRESSED OR HOPELESS: NOT AT ALL
SUM OF ALL RESPONSES TO PHQ QUESTIONS 1-9: 15
6. FEELING BAD ABOUT YOURSELF - OR THAT YOU ARE A FAILURE OR HAVE LET YOURSELF OR YOUR FAMILY DOWN: NOT AT ALL

## 2025-01-13 ASSESSMENT — ENCOUNTER SYMPTOMS
ABDOMINAL PAIN: 0
SHORTNESS OF BREATH: 0

## 2025-01-13 ASSESSMENT — LIFESTYLE VARIABLES
HOW OFTEN DO YOU HAVE A DRINK CONTAINING ALCOHOL: NEVER
HOW MANY STANDARD DRINKS CONTAINING ALCOHOL DO YOU HAVE ON A TYPICAL DAY: PATIENT DOES NOT DRINK

## 2025-01-13 NOTE — PROGRESS NOTES
HISTORY OF PRESENT ILLNESS  Paula Looney Jr. is a 69 y.o. male presenting today for   Chief Complaint   Patient presents with    Medicare AWV        HTN- Taking lisinopril 10mg daily. Denies changes in vision hearing or headaches     T2D- Diet controlled. .      HLD- Taking lipitor 10mg daily. Denies myalgias    Artery stenosis-Pt was recommend to have carotid surgery but declined this and received a 2nd opinion and was advised that surgery was not necessary based on updated carotid US. Recommeded to have b/l arterial studies and referred to neuro. This has made it difficult for him to walk prolonged periods at a time as a result of the pain and cramping he experiences.     Hand pain- This is chronic. Hx of hemiparesis 2/2 stroke and b/l carpal tunnel. His wife states he is starting to drop objects that he tries to hold in his hand.       Medications reviewed and updated.    Review of Systems   Eyes:  Negative for visual disturbance.   Respiratory:  Negative for shortness of breath.    Cardiovascular:  Negative for chest pain.   Gastrointestinal:  Negative for abdominal pain.   Neurological:  Negative for headaches.         /71 (Site: Left Upper Arm, Position: Sitting, Cuff Size: Medium Adult)   Pulse 73   Temp 97.2 °F (36.2 °C) (Temporal)   Resp 14   Ht 1.778 m (5' 10\")   Wt 80.6 kg (177 lb 12.8 oz)   SpO2 95%   BMI 25.51 kg/m²     Physical Exam  Vitals reviewed.   Constitutional:       Appearance: Normal appearance.   HENT:      Mouth/Throat:      Comments: MASK  Cardiovascular:      Rate and Rhythm: Normal rate and regular rhythm.      Pulses: Normal pulses.   Pulmonary:      Effort: Pulmonary effort is normal.      Breath sounds: Normal breath sounds.   Psychiatric:         Mood and Affect: Mood normal.         ASSESSMENT and PLAN    ICD-10-CM    1. Medicare annual wellness visit, subsequent  Z00.00       2. Essential (primary) hypertension  I10       3. Type 2 diabetes mellitus with other 
MD Juan   albuterol sulfate HFA (PROVENTIL;VENTOLIN;PROAIR) 108 (90 Base) MCG/ACT inhaler Inhale 1 puff into the lungs every 4 hours as needed for Wheezing Yes Eloisa Cheek DO   fluticasone (ARNUITY ELLIPTA) 100 MCG/ACT AEPB Inhale 2 puffs into the lungs in the morning and at bedtime Yes Eloisa Cheek DO   cetirizine (ZYRTEC) 10 MG tablet Take 1 tablet by mouth daily Yes Eloisa Cheek DO   triamcinolone (KENALOG) 0.025 % cream Apply topically 2 times daily Yes Eloisa Cheek DO   methocarbamol (ROBAXIN) 500 MG tablet TAKE 1 TABLET BY MOUTH TWO (2) TIMES DAILY AS NEEDED FOR MUSCLE SPASM(S). Yes Xiao Becker MD CareTeam (Including outside providers/suppliers regularly involved in providing care):   Patient Care Team:  Eloisa Cheek DO as PCP - General  Eloisa Cheek DO as PCP - Empaneled Provider     Recommendations for Preventive Services Due: see orders and patient instructions/AVS.  Recommended screening schedule for the next 5-10 years is provided to the patient in written form: see Patient Instructions/AVS.     Reviewed and updated this visit:  Allergies

## 2025-01-13 NOTE — PATIENT INSTRUCTIONS
telephone instead of talking or listening. These devices are also called TDD. When messages are typed on the keyboard, they are sent over the phone line to a receiving TTY. The message is shown on a monitor.  Use text messaging, social media, and email if it is hard for you to communicate by telephone.  Try to learn a listening technique called speechreading. It is not lipreading. You pay attention to people's gestures, expressions, posture, and tone of voice. These clues can help you understand what a person is saying. Face the person you are talking to, and have them face you. Make sure the lighting is good. You need to see the other person's face clearly.  Think about counseling if you need help to adjust to your hearing loss.  When should you call for help?  Watch closely for changes in your health, and be sure to contact your doctor if:    You think your hearing is getting worse.     You have new symptoms, such as dizziness or nausea.   Where can you learn more?  Go to https://www.[employer].net/patientEd and enter R798 to learn more about \"Hearing Loss: Care Instructions.\"  Current as of: September 27, 2023  Content Version: 14.3  © 2024 [employer].   Care instructions adapted under license by [employer]. If you have questions about a medical condition or this instruction, always ask your healthcare professional. [employer] disclaims any warranty or liability for your use of this information.         Learning About Vision Tests  What are vision tests?     The four most common vision tests are visual acuity tests, refraction, visual field tests, and color vision tests.  Visual acuity (sharpness) tests  These tests are used:  To see if you need glasses or contact lenses.  To monitor an eye problem.  To check an eye injury.  Visual acuity tests are done as part of routine exams. You may also have this test when you get your 's license or apply for some types of jobs.  Visual field

## 2025-01-14 NOTE — RESULT ENCOUNTER NOTE
Your blood work shows that you appear to be dehydrated, I would encourage you to increase your water intake to approximately 1 L of water a day

## 2025-01-31 ENCOUNTER — TELEPHONE (OUTPATIENT)
Facility: CLINIC | Age: 70
End: 2025-01-31

## 2025-01-31 ENCOUNTER — OFFICE VISIT (OUTPATIENT)
Age: 70
End: 2025-01-31
Payer: MEDICAID

## 2025-01-31 VITALS — WEIGHT: 177 LBS | BODY MASS INDEX: 25.34 KG/M2 | HEIGHT: 70 IN

## 2025-01-31 DIAGNOSIS — M19.042 PRIMARY OSTEOARTHRITIS OF BOTH HANDS: ICD-10-CM

## 2025-01-31 DIAGNOSIS — G56.03 BILATERAL CARPAL TUNNEL SYNDROME: Primary | ICD-10-CM

## 2025-01-31 DIAGNOSIS — M19.041 PRIMARY OSTEOARTHRITIS OF BOTH HANDS: ICD-10-CM

## 2025-01-31 PROCEDURE — 1123F ACP DISCUSS/DSCN MKR DOCD: CPT | Performed by: ORTHOPAEDIC SURGERY

## 2025-01-31 PROCEDURE — 99213 OFFICE O/P EST LOW 20 MIN: CPT | Performed by: ORTHOPAEDIC SURGERY

## 2025-01-31 NOTE — TELEPHONE ENCOUNTER
Patients wife is calling to see if he can get medication for his nerve pain. Pt says DO tried to give it to him a while ago and he denied it at time but now wants it because he has pain in his hand and feet

## 2025-01-31 NOTE — PROGRESS NOTES
Paula Looney Jr. is a 69 y.o. male right handed retiree.  Worker's Compensation and legal considerations: none    Chief Complaint   Patient presents with    Wrist Pain      Bilateral wrist pain        Pain Score:   8    1/31/2025 HPI: Patient presents today with a new complaint of chronic bilateral hand numbness and tingling.    Initial HPI: Patient presents today with complaints of bilateral hand pain.  He localizes it to be worse in the right ring finger localizing into the MCP joint.    Date of onset: Chronic  Injury: No  Prior Treatment:  No    ROS: Review of Systems - General ROS: negative except HPI    Past Medical History:   Diagnosis Date    Diabetes mellitus (HCC)     Hypertension     Neuropathy     SOB (shortness of breath)     Spinal stenosis        Past Surgical History:   Procedure Laterality Date    APPENDECTOMY      BACK SURGERY      CERVICAL FUSION  2012    [facility]    LUMBAR FUSION  2011    Dr. Haines    UPPER GASTROINTESTINAL ENDOSCOPY N/A 9/27/2023    EGD ESOPHAGOGASTRODUODENOSCOPY DILATION/ BIOPSIES performed by Anton Funes MD at [facility] ENDOSCOPY        Current Outpatient Medications   Medication Sig Dispense Refill    atorvastatin (LIPITOR) 40 MG tablet Take 1 tablet by mouth daily 90 tablet 0    aspirin ([facility] ASPIRIN LOW DOSE) 81 MG EC tablet Take 1 tablet by mouth daily 90 tablet 0    diclofenac sodium (VOLTAREN) 1 % GEL Apply 4 g topically 4 times daily 100 g 4    Lancets MISC 1 each by Does not apply route 2 times daily 300 each 2    Blood Glucose Monitoring Suppl (ONE TOUCH ULTRA 2) w/Device KIT Use to check blood sugar twice daily 1 kit 0    blood glucose test strips (ASCENSIA AUTODISC VI;ONE TOUCH ULTRA TEST VI) strip 1 each by In Vitro route 2 times daily Test 2 times a day & as needed for symptoms of irregular blood glucose. Dispense sufficient amount for indicated testing frequency plus additional to accommodate PRN testing needs. 200 each 2    tiZANidine (ZANAFLEX) 2 MG tablet

## 2025-02-10 NOTE — PROGRESS NOTES
Patient: Paula Looney Jr.                MRN: [id number]       SSN: [id number]  YOB: 1955        AGE: 69 y.o.        SEX: male      PCP: Eloisa Cheek DO  02/13/25    Chief Complaint   Patient presents with    Knee Pain     right     HISTORY:  Paula Looney Jr. is a 69 y.o. male who is seen for increased right knee pain. He denies any recent injury.  He feels right knee pain with standing, walking and stair climbing.  He experiences startup pain after sitting. He underwent a vein vascular surgery on the right leg recently 2/4/25.     He has been experiencing right knee pain for many years. He twisted his right knee in November 1973 when he stepped in a hole while stationed at [employer]. He was placed in a long leg cast for 6 weeks and did not undergo surgery. He states  that he has been experiencing intermittent right knee and leg pain and swelling since the injury.  He feels pain with standing, walking and stair climbing.  He experiences startup pain after sitting.     He takes Robaxin 500 mg BID as needed.    He was previously seen for leg pain and swelling.       He is s/p lumbar and cervical fusion by Dr. Haines.      Occupation, etc: Mr. Looney is retired. He worked as a  for [employer].  He served 2 years in the [employer] in 1972-74.  He lives in [place] with his wife. He is caring for his 90 y.o. mother. He has 3 sons, 3 daughters, 19 grandchildren  and 1 great-grandchild. Mr. Looney weighs 178 lbs and is 5'10\" tall. His wife accompanies him to today's office visit.  Wt Readings from Last 3 Encounters:   02/13/25 80.3 kg (177 lb)   01/31/25 80.3 kg (177 lb)   01/13/25 80.6 kg (177 lb 12.8 oz)      Body mass index is 25.4 kg/m².    Patient Active Problem List   Diagnosis    Type 2 diabetes mellitus with other diabetic neurological complication (HCC)    Excessive daytime sleepiness    Inadequate sleep hygiene    Essential (primary) hypertension    Personal

## 2025-02-13 ENCOUNTER — OFFICE VISIT (OUTPATIENT)
Age: 70
End: 2025-02-13
Payer: MEDICAID

## 2025-02-13 VITALS — HEIGHT: 70 IN | BODY MASS INDEX: 25.34 KG/M2 | WEIGHT: 177 LBS | TEMPERATURE: 97.5 F

## 2025-02-13 DIAGNOSIS — M17.11 UNILATERAL PRIMARY OSTEOARTHRITIS, RIGHT KNEE: ICD-10-CM

## 2025-02-13 DIAGNOSIS — G89.29 CHRONIC PAIN OF RIGHT KNEE: Primary | ICD-10-CM

## 2025-02-13 DIAGNOSIS — M25.561 CHRONIC PAIN OF RIGHT KNEE: Primary | ICD-10-CM

## 2025-02-13 PROCEDURE — 1123F ACP DISCUSS/DSCN MKR DOCD: CPT | Performed by: SPECIALIST

## 2025-02-13 PROCEDURE — 99213 OFFICE O/P EST LOW 20 MIN: CPT | Performed by: SPECIALIST

## 2025-02-14 ENCOUNTER — OFFICE VISIT (OUTPATIENT)
Age: 70
End: 2025-02-14
Payer: MEDICAID

## 2025-02-14 VITALS
SYSTOLIC BLOOD PRESSURE: 146 MMHG | BODY MASS INDEX: 25.48 KG/M2 | HEART RATE: 75 BPM | DIASTOLIC BLOOD PRESSURE: 68 MMHG | WEIGHT: 178 LBS | HEIGHT: 70 IN | OXYGEN SATURATION: 98 %

## 2025-02-14 DIAGNOSIS — Z72.0 TOBACCO ABUSE: ICD-10-CM

## 2025-02-14 DIAGNOSIS — I10 HYPERTENSION, UNSPECIFIED TYPE: Primary | ICD-10-CM

## 2025-02-14 PROCEDURE — 3077F SYST BP >= 140 MM HG: CPT | Performed by: INTERNAL MEDICINE

## 2025-02-14 PROCEDURE — 3078F DIAST BP <80 MM HG: CPT | Performed by: INTERNAL MEDICINE

## 2025-02-14 PROCEDURE — 1123F ACP DISCUSS/DSCN MKR DOCD: CPT | Performed by: INTERNAL MEDICINE

## 2025-02-14 PROCEDURE — 99214 OFFICE O/P EST MOD 30 MIN: CPT | Performed by: INTERNAL MEDICINE

## 2025-02-14 NOTE — PATIENT INSTRUCTIONS
Learning About the Mediterranean Diet  What is the Mediterranean diet?     The Mediterranean diet is a style of eating rather than a diet plan. It features foods eaten in [place], [place], southern [place] and [place], and other countries along the [place] Sea. It emphasizes eating foods like fish, fruits, vegetables, beans, high-fiber breads and whole grains, nuts, and olive oil. This style of eating includes limited red meat, cheese, and sweets.  Why choose the Mediterranean diet?  A Mediterranean-style diet may improve heart health. It contains more fat than other heart-healthy diets. But the fats are mainly from nuts, unsaturated oils (such as fish oils and olive oil), and certain nut or seed oils (such as canola, soybean, or flaxseed oil). These fats may help protect the heart and blood vessels.  How can you get started on the Mediterranean diet?  Here are some things you can do to switch to a more Mediterranean way of eating.  What to eat  Eat a variety of fruits and vegetables each day, such as grapes, blueberries, tomatoes, broccoli, peppers, figs, olives, spinach, eggplant, beans, lentils, and chickpeas.  Eat a variety of whole-grain foods each day, such as oats, brown rice, and whole wheat bread, pasta, and couscous.  Eat fish at least 2 times a week. Try tuna, salmon, mackerel, lake trout, herring, or sardines.  Eat moderate amounts of low-fat dairy products, such as milk, cheese, or yogurt.  Eat moderate amounts of poultry and eggs.  Choose healthy (unsaturated) fats, such as nuts, olive oil, and certain nut or seed oils like canola, soybean, and flaxseed.  Limit unhealthy (saturated) fats, such as butter, palm oil, and coconut oil. And limit fats found in animal products, such as meat and dairy products made with whole milk. Try to eat red meat only a few times a month in very small amounts.  Limit sweets and desserts to only a few times a week. This includes sugar-sweetened drinks like soda.  The

## 2025-02-14 NOTE — PROGRESS NOTES
Have you had Fatigue?  No      2.   Have you had chest Pain? No     3.   Have you had Dyspnea (SOB) ? No     4.   Have you had Orthopnea? No      5.   Have you had PND? No     6.   Have you had leg swelling? No     7.    Have you had any weight gain? No      8. Have you had any palpitations? No      9. Have you had any syncope? No     10. Do you have any wounds on legs? No  
CHIEF COMPLAINT: Epistaxis    Interval Events: Patient showed no epileptic activity overnight, no acute concerns , ENT removing the nasal packing this morning    REVIEW OF SYSTEMS:  Constitutional: [ ] negative [ ] fevers [ ] chills [ ] weight loss [ ] weight gain  HEENT: [ ] negative [ ] dry eyes [ ] eye irritation [ ] postnasal drip [ ] nasal congestion  CV: [ ] negative  [ ] chest pain [ ] orthopnea [ ] palpitations [ ] murmur  Resp: [ ] negative [ ] cough [ ] shortness of breath [ ] dyspnea [ ] wheezing [ ] sputum [ ] hemoptysis  GI: [ ] negative [ ] nausea [ ] vomiting [ ] diarrhea [ ] constipation [ ] abd pain [ ] dysphagia   : [ ] negative [ ] dysuria [ ] nocturia [ ] hematuria [ ] increased urinary frequency  Musculoskeletal: [ ] negative [ ] back pain [ ] myalgias [ ] arthralgias [ ] fracture  Skin: [ ] negative [ ] rash [ ] itch  Neurological: [ ] negative [ ] headache [ ] dizziness [ ] syncope [ ] weakness [ ] numbness  Psychiatric: [ ] negative [ ] anxiety [ ] depression  Endocrine: [ ] negative [ ] diabetes [ ] thyroid problem  Hematologic/Lymphatic: [ ] negative [ ] anemia [ ] bleeding problem  Allergic/Immunologic: [ ] negative [ ] itchy eyes [ ] nasal discharge [ ] hives [ ] angioedema  [ ] All other systems negative  [X] Unable to assess ROS because Pt. was sedated and intubated.    OBJECTIVE:  ICU Vital Signs Last 24 Hrs  T(C): 36.6 (16 Jul 2017 04:00), Max: 37.9 (15 Jul 2017 19:15)  T(F): 97.9 (16 Jul 2017 04:00), Max: 100.2 (15 Jul 2017 19:15)  HR: 70 (16 Jul 2017 07:00) (51 - 118)  BP: 113/59 (16 Jul 2017 07:00) (91/54 - 158/71)  BP(mean): 82 (16 Jul 2017 07:00) (66 - 102)  ABP: --  ABP(mean): --  RR: 16 (16 Jul 2017 07:00) (16 - 24)  SpO2: 98% (16 Jul 2017 07:00) (80% - 100%)    Mode: AC/ CMV (Assist Control/ Continuous Mandatory Ventilation), RR (machine): 16, TV (machine): 400, FiO2: 30, PEEP: 5, ITime: 1, MAP: 11, PIP: 30    07-15 @ 07:01  -  07-16 @ 07:00  --------------------------------------------------------  IN: 2567.8 mL / OUT: 40 mL / NET: 2527.8 mL      CAPILLARY BLOOD GLUCOSE    Physical Exam:   General:  NAD, patient has some agitated movements  HEENT: PERRLA, EOMI, moist mucous membranes  Neurology: Intubated/Sedated  Respiratory: CTA B/L, normal respiratory effort, no wheezes, crackles, rales  CV: RRR, S1S2, no murmurs, rubs or gallops  Abdominal: Soft, NT, ND +BS. PEG tube in place  Extremities: No edema, + peripheral pulses  Skin: Dry/Warm      LINES:    HOSPITAL MEDICATIONS:  heparin  Injectable 5000 Unit(s) SubCutaneous every 8 hours    cefepime  IVPB 1000 milliGRAM(s) IV Intermittent every 8 hours  cefepime  IVPB   IV Intermittent   vancomycin  IVPB 1000 milliGRAM(s) IV Intermittent every 12 hours    norepinephrine Infusion 0.01 MICROgram(s)/kG/Min IV Continuous <Continuous>    levothyroxine 75 MICROGram(s) Oral daily      carbidopa/levodopa  25/100 2 Tablet(s) Oral every 4 hours  midazolam Infusion 6 mG/Hr IV Continuous <Continuous>  fosphenytoin IVPB 100 milliGRAM(s) PE IV Intermittent every 8 hours    pantoprazole   Suspension 40 milliGRAM(s) Oral before breakfast        LABS:                        10.2   9.7   )-----------( 132      ( 16 Jul 2017 02:16 )             31.4     Hgb Trend: 10.2<--, 10.6<--, 10.4<--, 11.7<--, 14.4<--  07-16    138  |  104  |  15  ----------------------------<  98  3.9   |  24  |  0.45<L>    Ca    8.0<L>      16 Jul 2017 02:16  Phos  3.3     07-16  Mg     1.8     07-16    TPro  5.8<L>  /  Alb  2.9<L>  /  TBili  0.3  /  DBili  x   /  AST  19  /  ALT  7<L>  /  AlkPhos  59  07-16    Creatinine Trend: 0.45<--, 0.51<--, 0.58<--, 0.92<--, 0.55<--      Arterial Blood Gas:  07-15 @ 09:15  7.42/39/81/25/96/1.0  ABG lactate: --  Arterial Blood Gas:  07-14 @ 15:59  7.50/32/95/24/97/2.1  ABG lactate: --        MICROBIOLOGY:   Culture - Blood (07.14.17 @ 14:01)    Specimen Source: .Blood Blood    Culture Results:   No growth to date.    Culture - Urine (07.14.17 @ 12:35)    Specimen Source: .Urine Clean Catch (Midstream)    Culture Results:   No growth        RADIOLOGY:  [X] Reviewed and interpreted by me  < from: Xray Chest 1 View AP- PORTABLE-Urgent (07.13.17 @ 11:21) >  Impression: Endotracheal tube in good position. Mild left basilar   atelectasis. Otherwise, clear lungs.      EKG:  < from: 12 Lead ECG (07.13.17 @ 11:13) >  Diagnosis Line SINUS RHYTHM WITH PREMATURE SUPRAVENTRICULAR COMPLEXES  RIGHT ATRIAL ENLARGEMENT  LEFT AXIS DEVIATION  PULMONARY DISEASE PATTERN  NONSPECIFIC ST AND T WAVE ABNORMALITY    EEG:    FINDINGS:  Compressed Spectral Array (CSA) data was reviewed separately and correlated with the electroencephalographic findings detailed above.  CSA showed a variable spectral pattern.  Areas of increased power in particular were reviewed in detail, and compared with the raw EEG data.  Areas of abrupt increases in spectral power were reviewed to exclude seizures, and were determined to be artifactual in nature except during seizures above    The relative ratio of the power of delta range frequencies and faster frequencies remained stable over the course of the study.  There was no definitive increase in the relative power in the delta frequency spectrum apparent in the left hemisphere versus the right hemisphere.      Compressed Spectral Array (Digital Analysis) Summary/ Impression:  No persistent hemispheric asymmetry.  Intermittent areas of increased power reviewed, without definite epileptiform activity associated on CSA except during seizures above    EEG Classification / Summary:  Abnormal Routine EEG Study:  Occasional right frontal-central sharp waves   Moderate generalized slowing   Breach artifact over the right frontal-paracentral region   Generalized fast activity       Clinical Impression:  Findings indicate moderate diffuse or multifocal cerebral dysfunction with increased risk of focal onset seizure from the right frontal-central region.  Diffuse fast activity likely from medication effect.  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  No seizures.
CHIEF COMPLAINT: Epistaxis    Interval Events: Patient showed no epileptic activity overnight, no acute concerns Titrated versed down to 2mg/hr , she became tachy/tremors/ no eyes change, off pressors      REVIEW OF SYSTEMS:  Constitutional: [ ] negative [ ] fevers [ ] chills [ ] weight loss [ ] weight gain  HEENT: [ ] negative [ ] dry eyes [ ] eye irritation [ ] postnasal drip [ ] nasal congestion  CV: [ ] negative  [ ] chest pain [ ] orthopnea [ ] palpitations [ ] murmur  Resp: [ ] negative [ ] cough [ ] shortness of breath [ ] dyspnea [ ] wheezing [ ] sputum [ ] hemoptysis  GI: [ ] negative [ ] nausea [ ] vomiting [ ] diarrhea [ ] constipation [ ] abd pain [ ] dysphagia   : [ ] negative [ ] dysuria [ ] nocturia [ ] hematuria [ ] increased urinary frequency  Musculoskeletal: [ ] negative [ ] back pain [ ] myalgias [ ] arthralgias [ ] fracture  Skin: [ ] negative [ ] rash [ ] itch  Neurological: [ ] negative [ ] headache [ ] dizziness [ ] syncope [ ] weakness [ ] numbness  Psychiatric: [ ] negative [ ] anxiety [ ] depression  Endocrine: [ ] negative [ ] diabetes [ ] thyroid problem  Hematologic/Lymphatic: [ ] negative [ ] anemia [ ] bleeding problem  Allergic/Immunologic: [ ] negative [ ] itchy eyes [ ] nasal discharge [ ] hives [ ] angioedema  [ ] All other systems negative  [X] Unable to assess ROS because Pt. was sedated and intubated.    OBJECTIVE:  ICU Vital Signs Last 24 Hrs  T(C): 36.6 (16 Jul 2017 04:00), Max: 37.9 (15 Jul 2017 19:15)  T(F): 97.9 (16 Jul 2017 04:00), Max: 100.2 (15 Jul 2017 19:15)  HR: 70 (16 Jul 2017 07:00) (51 - 118)  BP: 113/59 (16 Jul 2017 07:00) (91/54 - 158/71)  BP(mean): 82 (16 Jul 2017 07:00) (66 - 102)  ABP: --  ABP(mean): --  RR: 16 (16 Jul 2017 07:00) (16 - 24)  SpO2: 98% (16 Jul 2017 07:00) (80% - 100%)    Mode: AC/ CMV (Assist Control/ Continuous Mandatory Ventilation), RR (machine): 16, TV (machine): 400, FiO2: 30, PEEP: 5, ITime: 1, MAP: 11, PIP: 30    07-15 @ 07:01  -  07-16 @ 07:00  --------------------------------------------------------  IN: 2567.8 mL / OUT: 40 mL / NET: 2527.8 mL      CAPILLARY BLOOD GLUCOSE    Physical Exam:   General:  NAD, patient has some agitated movements  HEENT: PERRLA, EOMI, moist mucous membranes  Neurology: Intubated/Sedated  Respiratory: CTA B/L, normal respiratory effort, no wheezes, crackles, rales  CV: RRR, S1S2, no murmurs, rubs or gallops  Abdominal: Soft, NT, ND +BS. PEG tube in place  Extremities: No edema, + peripheral pulses  Skin: Dry/Warm      LINES:    HOSPITAL MEDICATIONS:  heparin  Injectable 5000 Unit(s) SubCutaneous every 8 hours    cefepime  IVPB 1000 milliGRAM(s) IV Intermittent every 8 hours  cefepime  IVPB   IV Intermittent   vancomycin  IVPB 1000 milliGRAM(s) IV Intermittent every 12 hours    norepinephrine Infusion 0.01 MICROgram(s)/kG/Min IV Continuous <Continuous>    levothyroxine 75 MICROGram(s) Oral daily      carbidopa/levodopa  25/100 2 Tablet(s) Oral every 4 hours  midazolam Infusion 6 mG/Hr IV Continuous <Continuous>  fosphenytoin IVPB 100 milliGRAM(s) PE IV Intermittent every 8 hours    pantoprazole   Suspension 40 milliGRAM(s) Oral before breakfast        LABS:                        10.2   9.7   )-----------( 132      ( 16 Jul 2017 02:16 )             31.4     Hgb Trend: 10.2<--, 10.6<--, 10.4<--, 11.7<--, 14.4<--  07-16    138  |  104  |  15  ----------------------------<  98  3.9   |  24  |  0.45<L>    Ca    8.0<L>      16 Jul 2017 02:16  Phos  3.3     07-16  Mg     1.8     07-16    TPro  5.8<L>  /  Alb  2.9<L>  /  TBili  0.3  /  DBili  x   /  AST  19  /  ALT  7<L>  /  AlkPhos  59  07-16    Creatinine Trend: 0.45<--, 0.51<--, 0.58<--, 0.92<--, 0.55<--      Arterial Blood Gas:  07-15 @ 09:15  7.42/39/81/25/96/1.0  ABG lactate: --  Arterial Blood Gas:  07-14 @ 15:59  7.50/32/95/24/97/2.1  ABG lactate: --        MICROBIOLOGY:   Culture - Blood (07.14.17 @ 14:01)    Specimen Source: .Blood Blood    Culture Results:   No growth to date.    Culture - Urine (07.14.17 @ 12:35)    Specimen Source: .Urine Clean Catch (Midstream)    Culture Results:   No growth        RADIOLOGY:  [X] Reviewed and interpreted by me  < from: Xray Chest 1 View AP- PORTABLE-Urgent (07.13.17 @ 11:21) >  Impression: Endotracheal tube in good position. Mild left basilar   atelectasis. Otherwise, clear lungs.      EKG:  < from: 12 Lead ECG (07.13.17 @ 11:13) >  Diagnosis Line SINUS RHYTHM WITH PREMATURE SUPRAVENTRICULAR COMPLEXES  RIGHT ATRIAL ENLARGEMENT  LEFT AXIS DEVIATION  PULMONARY DISEASE PATTERN  NONSPECIFIC ST AND T WAVE ABNORMALITY    EEG:    FINDINGS:  Compressed Spectral Array (CSA) data was reviewed separately and correlated with the electroencephalographic findings detailed above.  CSA showed a variable spectral pattern.  Areas of increased power in particular were reviewed in detail, and compared with the raw EEG data.  Areas of abrupt increases in spectral power were reviewed to exclude seizures, and were determined to be artifactual in nature except during seizures above    The relative ratio of the power of delta range frequencies and faster frequencies remained stable over the course of the study.  There was no definitive increase in the relative power in the delta frequency spectrum apparent in the left hemisphere versus the right hemisphere.      Compressed Spectral Array (Digital Analysis) Summary/ Impression:  No persistent hemispheric asymmetry.  Intermittent areas of increased power reviewed, without definite epileptiform activity associated on CSA except during seizures above    EEG Classification / Summary:  Abnormal Routine EEG Study:  Occasional right frontal-central sharp waves   Moderate generalized slowing   Breach artifact over the right frontal-paracentral region   Generalized fast activity       Clinical Impression:  Findings indicate moderate diffuse or multifocal cerebral dysfunction with increased risk of focal onset seizure from the right frontal-central region.  Diffuse fast activity likely from medication effect.  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  No seizures.
Skin is warm and dry.      Findings: No erythema or rash.   Neurological:      Mental Status: He is alert. Mental status is at baseline.      Motor: No weakness.      Gait: Gait normal.   Psychiatric:         Mood and Affect: Mood normal.         Behavior: Behavior normal.         Thought Content: Thought content normal.         ASSESSMENT and PLAN  Mr. Looney has a reminder for a \"due or due soon\" health maintenance. I have asked that he contact his primary care provider for follow-up on this health maintenance.    Coronary risk equivalent DM - Continue with aspirin 81mg po daily, Continue with lipitor but change to 40mg po daily for pleiotropic antiinflammatory effects.  CMP 7/2024 AST ALT wnl.      HTN - Stage II pressure systolic with normotensive diastolic pressure, Continue with lisinopril 10mg po daily.      Right MCA dilitaiton - Patient is due to followup with Dr. Mon Neurosurgeon regarding this. Continue with aspirin 81mg po daily monotherapy.      Tobacco Abuse - Counseled against using tobacco and is using volition to try to stop. Offered smoking cessation aids.   Currently doing 2 cigars a day.          
CHIEF COMPLAINT: Epistaxis    Interval Events: Patient showed no epileptic activity overnight, no acute concerns Titrated versed down to 2mg/hr and now off completely since this morning , she became tachy/tremors/ no eyes change, off pressors, in afternoon shes having tremors more consistent with PD w/o eye changes. She is tracking with her eye.      REVIEW OF SYSTEMS:  Constitutional: [ ] negative [ ] fevers [ ] chills [ ] weight loss [ ] weight gain  HEENT: [ ] negative [ ] dry eyes [ ] eye irritation [ ] postnasal drip [ ] nasal congestion  CV: [ ] negative  [ ] chest pain [ ] orthopnea [ ] palpitations [ ] murmur  Resp: [ ] negative [ ] cough [ ] shortness of breath [ ] dyspnea [ ] wheezing [ ] sputum [ ] hemoptysis  GI: [ ] negative [ ] nausea [ ] vomiting [ ] diarrhea [ ] constipation [ ] abd pain [ ] dysphagia   : [ ] negative [ ] dysuria [ ] nocturia [ ] hematuria [ ] increased urinary frequency  Musculoskeletal: [ ] negative [ ] back pain [ ] myalgias [ ] arthralgias [ ] fracture  Skin: [ ] negative [ ] rash [ ] itch  Neurological: [ ] negative [ ] headache [ ] dizziness [ ] syncope [ ] weakness [ ] numbness  Psychiatric: [ ] negative [ ] anxiety [ ] depression  Endocrine: [ ] negative [ ] diabetes [ ] thyroid problem  Hematologic/Lymphatic: [ ] negative [ ] anemia [ ] bleeding problem  Allergic/Immunologic: [ ] negative [ ] itchy eyes [ ] nasal discharge [ ] hives [ ] angioedema  [ ] All other systems negative  [X] Unable to assess ROS because Pt. was sedated and intubated.    OBJECTIVE:  ICU Vital Signs Last 24 Hrs  T(C): 36.6 (16 Jul 2017 04:00), Max: 37.9 (15 Jul 2017 19:15)  T(F): 97.9 (16 Jul 2017 04:00), Max: 100.2 (15 Jul 2017 19:15)  HR: 70 (16 Jul 2017 07:00) (51 - 118)  BP: 113/59 (16 Jul 2017 07:00) (91/54 - 158/71)  BP(mean): 82 (16 Jul 2017 07:00) (66 - 102)  ABP: --  ABP(mean): --  RR: 16 (16 Jul 2017 07:00) (16 - 24)  SpO2: 98% (16 Jul 2017 07:00) (80% - 100%)    Mode: AC/ CMV (Assist Control/ Continuous Mandatory Ventilation), RR (machine): 16, TV (machine): 400, FiO2: 30, PEEP: 5, ITime: 1, MAP: 11, PIP: 30    07-15 @ 07:01  -  07-16 @ 07:00  --------------------------------------------------------  IN: 2567.8 mL / OUT: 40 mL / NET: 2527.8 mL      CAPILLARY BLOOD GLUCOSE    Physical Exam:   General:  NAD, patient has some agitated movements  HEENT: PERRLA, EOMI, moist mucous membranes  Neurology: Intubated/Sedated  Respiratory: CTA B/L, normal respiratory effort, no wheezes, crackles, rales  CV: RRR, S1S2, no murmurs, rubs or gallops  Abdominal: Soft, NT, ND +BS. PEG tube in place  Extremities: No edema, + peripheral pulses  Skin: Dry/Warm      LINES:    HOSPITAL MEDICATIONS:  heparin  Injectable 5000 Unit(s) SubCutaneous every 8 hours    cefepime  IVPB 1000 milliGRAM(s) IV Intermittent every 8 hours  cefepime  IVPB   IV Intermittent   vancomycin  IVPB 1000 milliGRAM(s) IV Intermittent every 12 hours    norepinephrine Infusion 0.01 MICROgram(s)/kG/Min IV Continuous <Continuous>    levothyroxine 75 MICROGram(s) Oral daily      carbidopa/levodopa  25/100 2 Tablet(s) Oral every 4 hours  midazolam Infusion 6 mG/Hr IV Continuous <Continuous>  fosphenytoin IVPB 100 milliGRAM(s) PE IV Intermittent every 8 hours    pantoprazole   Suspension 40 milliGRAM(s) Oral before breakfast        LABS:                        10.2   9.7   )-----------( 132      ( 16 Jul 2017 02:16 )             31.4     Hgb Trend: 10.2<--, 10.6<--, 10.4<--, 11.7<--, 14.4<--  07-16    138  |  104  |  15  ----------------------------<  98  3.9   |  24  |  0.45<L>    Ca    8.0<L>      16 Jul 2017 02:16  Phos  3.3     07-16  Mg     1.8     07-16    TPro  5.8<L>  /  Alb  2.9<L>  /  TBili  0.3  /  DBili  x   /  AST  19  /  ALT  7<L>  /  AlkPhos  59  07-16    Creatinine Trend: 0.45<--, 0.51<--, 0.58<--, 0.92<--, 0.55<--      Arterial Blood Gas:  07-15 @ 09:15  7.42/39/81/25/96/1.0  ABG lactate: --  Arterial Blood Gas:  07-14 @ 15:59  7.50/32/95/24/97/2.1  ABG lactate: --        MICROBIOLOGY:   Culture - Blood (07.14.17 @ 14:01)    Specimen Source: .Blood Blood    Culture Results:   No growth to date.    Culture - Urine (07.14.17 @ 12:35)    Specimen Source: .Urine Clean Catch (Midstream)    Culture Results:   No growth        RADIOLOGY:  [X] Reviewed and interpreted by me  < from: Xray Chest 1 View AP- PORTABLE-Urgent (07.13.17 @ 11:21) >  Impression: Endotracheal tube in good position. Mild left basilar   atelectasis. Otherwise, clear lungs.      EKG:  < from: 12 Lead ECG (07.13.17 @ 11:13) >  Diagnosis Line SINUS RHYTHM WITH PREMATURE SUPRAVENTRICULAR COMPLEXES  RIGHT ATRIAL ENLARGEMENT  LEFT AXIS DEVIATION  PULMONARY DISEASE PATTERN  NONSPECIFIC ST AND T WAVE ABNORMALITY    EEG:    FINDINGS:  Compressed Spectral Array (CSA) data was reviewed separately and correlated with the electroencephalographic findings detailed above.  CSA showed a variable spectral pattern.  Areas of increased power in particular were reviewed in detail, and compared with the raw EEG data.  Areas of abrupt increases in spectral power were reviewed to exclude seizures, and were determined to be artifactual in nature except during seizures above    The relative ratio of the power of delta range frequencies and faster frequencies remained stable over the course of the study.  There was no definitive increase in the relative power in the delta frequency spectrum apparent in the left hemisphere versus the right hemisphere.      Compressed Spectral Array (Digital Analysis) Summary/ Impression:  No persistent hemispheric asymmetry.  Intermittent areas of increased power reviewed, without definite epileptiform activity associated on CSA except during seizures above    EEG Classification / Summary:  Abnormal Routine EEG Study:  Occasional right frontal-central sharp waves   Moderate generalized slowing   Breach artifact over the right frontal-paracentral region   Generalized fast activity       Clinical Impression:  Findings indicate moderate diffuse or multifocal cerebral dysfunction with increased risk of focal onset seizure from the right frontal-central region.  Diffuse fast activity likely from medication effect.  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  No seizures.    EG Summary/Classification:  -Abnormal prolonged video EEG due to:  1)  Breach Artifact over the right frontal region  2)  Diffuse Beta Activity   2)  Moderate-Severe Background Slowing    EEG Impression/Clinical Correlate:  -Findings indicate nonspecific diffuse or multifocal cerebral dysfunction. Prominent beta activity may be a normal variant or may be seen in the setting of sedative medication use. Breach artifact from a right frontal skull defect is noted.

## 2025-03-03 DIAGNOSIS — G89.29 OTHER CHRONIC PAIN: ICD-10-CM

## 2025-03-03 RX ORDER — LISINOPRIL 10 MG/1
10 TABLET ORAL DAILY
Qty: 90 TABLET | Refills: 0 | Status: SHIPPED | OUTPATIENT
Start: 2025-03-03

## 2025-03-03 NOTE — TELEPHONE ENCOUNTER
Last Appointment:  1/13/2025  Future Appointments   Date Time Provider Department Center   3/11/2025 10:30 AM Los Riley PA-C [facility] AM[facility]   4/29/2025  3:15 PM Redd Corea MD [facility] AMB   5/2/2025  9:15 AM Jean Wayne DO [facility] AM[facility]   7/15/2025  8:30 [facility]   8/15/2025  8:30 AM cA Santos MD [facility] AMB

## 2025-03-11 ENCOUNTER — OFFICE VISIT (OUTPATIENT)
Age: 70
End: 2025-03-11
Payer: MEDICARE

## 2025-03-11 VITALS — WEIGHT: 175 LBS | BODY MASS INDEX: 25.05 KG/M2 | HEIGHT: 70 IN

## 2025-03-11 DIAGNOSIS — M54.59 MECHANICAL LOW BACK PAIN: ICD-10-CM

## 2025-03-11 DIAGNOSIS — M25.69 DECREASED RANGE OF MOTION OF TRUNK AND BACK: ICD-10-CM

## 2025-03-11 DIAGNOSIS — R26.9 ABNORMALITY OF GAIT: ICD-10-CM

## 2025-03-11 DIAGNOSIS — Z98.1 STATUS POST LUMBAR SPINAL ARTHRODESIS: ICD-10-CM

## 2025-03-11 DIAGNOSIS — Z86.59 HISTORY OF CLAUSTROPHOBIA: ICD-10-CM

## 2025-03-11 DIAGNOSIS — G89.29 CHRONIC LOW BACK PAIN, UNSPECIFIED BACK PAIN LATERALITY, UNSPECIFIED WHETHER SCIATICA PRESENT: Primary | ICD-10-CM

## 2025-03-11 DIAGNOSIS — M47.816 SPONDYLOSIS OF LUMBAR REGION WITHOUT MYELOPATHY OR RADICULOPATHY: ICD-10-CM

## 2025-03-11 DIAGNOSIS — M47.816 FACET ARTHROPATHY, LUMBAR: ICD-10-CM

## 2025-03-11 DIAGNOSIS — M54.50 CHRONIC LOW BACK PAIN, UNSPECIFIED BACK PAIN LATERALITY, UNSPECIFIED WHETHER SCIATICA PRESENT: Primary | ICD-10-CM

## 2025-03-11 PROCEDURE — 1125F AMNT PAIN NOTED PAIN PRSNT: CPT | Performed by: PHYSICIAN ASSISTANT

## 2025-03-11 PROCEDURE — 72110 X-RAY EXAM L-2 SPINE 4/>VWS: CPT | Performed by: PHYSICIAN ASSISTANT

## 2025-03-11 PROCEDURE — G8428 CUR MEDS NOT DOCUMENT: HCPCS | Performed by: PHYSICIAN ASSISTANT

## 2025-03-11 PROCEDURE — 1123F ACP DISCUSS/DSCN MKR DOCD: CPT | Performed by: PHYSICIAN ASSISTANT

## 2025-03-11 PROCEDURE — G8417 CALC BMI ABV UP PARAM F/U: HCPCS | Performed by: PHYSICIAN ASSISTANT

## 2025-03-11 PROCEDURE — 99214 OFFICE O/P EST MOD 30 MIN: CPT | Performed by: PHYSICIAN ASSISTANT

## 2025-03-11 PROCEDURE — 4004F PT TOBACCO SCREEN RCVD TLK: CPT | Performed by: PHYSICIAN ASSISTANT

## 2025-03-11 PROCEDURE — 3017F COLORECTAL CA SCREEN DOC REV: CPT | Performed by: PHYSICIAN ASSISTANT

## 2025-03-11 RX ORDER — DIAZEPAM 5 MG/1
TABLET ORAL
Qty: 2 TABLET | Refills: 0 | Status: SHIPPED | OUTPATIENT
Start: 2025-03-11 | End: 2025-03-11

## 2025-03-11 NOTE — PROGRESS NOTES
[facility] Station 3/11/2025 space 4 view of the lumbar spine reveals existing L4-S1 pedicle screw with juan david fixation with evidence of lucency associated with the pedicle screws and L4.  The L5 pedicle screws appear proud to the superior endplate of L5.  No fracture of the hardware noted.  There is dilation of the descending abdominal aorta with extensive calcification.  L3-L4 disc space appears to be well-maintained.    IMPRESSION:      ICD-10-CM    1. Chronic low back pain, unspecified back pain laterality, unspecified whether sciatica present  M54.50 [[id number]] LS Spine 4V    G89.29       2. Spondylosis of lumbar region without myelopathy or radiculopathy  M47.816       3. Mechanical low back pain  M54.59       4. Facet arthropathy, lumbar  M47.816       5. Decreased range of motion of trunk and back  M25.69       6. Status post lumbar spinal arthrodesis  Z98.1       7. Abnormality of gait  R26.9       8. History of claustrophobia  Z86.59            PLAN:  Based on patient's current lumbar fixation with possible evidence of loosening of the L4 pedicle screws and his chronic pain a repeat MRI has been ordered with MARS protocol.  Patient will follow back once that is available for review recomment.           TOTAL TIME SPENT WITH PATIENT FOR TODAY'S VISIT WAS DEVOTED TO FACE-TO-FACE COUNSELING REGARDING THE FOLLOWING:  DISCUSSED DIAGNOSIS, TREATMENT OPTIONS, AND RISKS AND BENEFITS OF TREATMENT          Special note: [employer] handout for telephonic weight loss strategies provided.      Special note: Medication management discussed and patient will begin the following per recommended dosing.    (  )     (  )     (Self-guided) Physical therapy ordered for range of motion all modalities, stretching, range of motion of specific functional group associated     with primary treating condition,  gentle strengthening of musculature with attention to increasing endurance, gait training,balance and fine motor coordination.

## 2025-03-11 NOTE — TELEPHONE ENCOUNTER
Patient stopped by the office to request refills on the following medications to be sent to the [facility] on file.    Clopidogrel 75mg  DiazePam 5mg  Loratadine 10mg  Methocarbamol 500mg    Last Appointment:  1/13/2025  Future Appointments   Date Time Provider Department Center   3/11/2025 10:30 AM Los Riley PA-C [facility] AMB   4/29/2025  3:15 PM Redd Corea MD [facility] AMB   5/2/2025  9:15 AM Jean Wayne DO [facility] AMB   7/15/2025  8:30 [facility]   8/15/2025  8:30 AM Ac Santos MD [facility] AMB

## 2025-03-12 RX ORDER — DIAZEPAM 5 MG/1
TABLET ORAL
Qty: 2 TABLET | Refills: 0 | OUTPATIENT
Start: 2025-03-12 | End: 2025-03-12

## 2025-03-12 RX ORDER — CLOPIDOGREL BISULFATE 75 MG/1
75 TABLET ORAL DAILY
COMMUNITY
End: 2025-03-25 | Stop reason: SDUPTHER

## 2025-03-12 RX ORDER — METHOCARBAMOL 500 MG/1
500 TABLET, FILM COATED ORAL 3 TIMES DAILY
Qty: 180 TABLET | Refills: 1 | OUTPATIENT
Start: 2025-03-12

## 2025-03-12 RX ORDER — LORATADINE 10 MG/1
10 TABLET ORAL DAILY
Qty: 30 TABLET | Refills: 0 | OUTPATIENT
Start: 2025-03-12

## 2025-03-12 RX ORDER — CLOPIDOGREL BISULFATE 75 MG/1
75 TABLET ORAL DAILY
Qty: 30 TABLET | Refills: 0 | OUTPATIENT
Start: 2025-03-12

## 2025-03-20 ENCOUNTER — TELEPHONE (OUTPATIENT)
Facility: CLINIC | Age: 70
End: 2025-03-20

## 2025-03-20 NOTE — TELEPHONE ENCOUNTER
Patient is requesting an referral to Psych for depression wife was asking if you could prescribe something for him or will he need to be referred out please advise

## 2025-03-20 NOTE — TELEPHONE ENCOUNTER
He will need an appointment to discuss this further in regards to medications, labs that should be checked prior to starting medication and referral

## 2025-03-25 ENCOUNTER — OFFICE VISIT (OUTPATIENT)
Facility: CLINIC | Age: 70
End: 2025-03-25
Payer: MEDICARE

## 2025-03-25 ENCOUNTER — HOSPITAL ENCOUNTER (OUTPATIENT)
Facility: HOSPITAL | Age: 70
Setting detail: SPECIMEN
Discharge: HOME | End: 2025-03-28
Payer: MEDICARE

## 2025-03-25 VITALS
TEMPERATURE: 97.2 F | WEIGHT: 175 LBS | SYSTOLIC BLOOD PRESSURE: 107 MMHG | DIASTOLIC BLOOD PRESSURE: 67 MMHG | RESPIRATION RATE: 18 BRPM | HEART RATE: 80 BPM | HEIGHT: 70 IN | BODY MASS INDEX: 25.05 KG/M2 | OXYGEN SATURATION: 98 %

## 2025-03-25 DIAGNOSIS — Z71.1 CONCERN ABOUT MEMORY: ICD-10-CM

## 2025-03-25 DIAGNOSIS — E11.49 TYPE 2 DIABETES MELLITUS WITH OTHER DIABETIC NEUROLOGICAL COMPLICATION: ICD-10-CM

## 2025-03-25 DIAGNOSIS — R06.00 DYSPNEA, UNSPECIFIED TYPE: ICD-10-CM

## 2025-03-25 DIAGNOSIS — I73.9 PAD (PERIPHERAL ARTERY DISEASE): ICD-10-CM

## 2025-03-25 DIAGNOSIS — Z79.899 MEDICATION MANAGEMENT: ICD-10-CM

## 2025-03-25 DIAGNOSIS — F43.29 STRESS AND ADJUSTMENT REACTION: Primary | ICD-10-CM

## 2025-03-25 DIAGNOSIS — I10 ESSENTIAL (PRIMARY) HYPERTENSION: ICD-10-CM

## 2025-03-25 DIAGNOSIS — I70.0 ATHEROSCLEROSIS OF AORTA: ICD-10-CM

## 2025-03-25 DIAGNOSIS — F32.A DEPRESSION, UNSPECIFIED DEPRESSION TYPE: ICD-10-CM

## 2025-03-25 DIAGNOSIS — Z87.891 PERSONAL HISTORY OF TOBACCO USE: ICD-10-CM

## 2025-03-25 DIAGNOSIS — M19.041 PRIMARY OSTEOARTHRITIS OF BOTH HANDS: ICD-10-CM

## 2025-03-25 DIAGNOSIS — G89.29 OTHER CHRONIC PAIN: ICD-10-CM

## 2025-03-25 DIAGNOSIS — E78.2 MIXED HYPERLIPIDEMIA: ICD-10-CM

## 2025-03-25 DIAGNOSIS — M19.042 PRIMARY OSTEOARTHRITIS OF BOTH HANDS: ICD-10-CM

## 2025-03-25 LAB
T4 FREE SERPL-MCNC: 1 NG/DL (ref 0.7–1.5)
TSH SERPL DL<=0.05 MIU/L-ACNC: 4 UIU/ML (ref 0.36–3.74)
VIT B12 SERPL-MCNC: 999 PG/ML (ref 211–911)

## 2025-03-25 PROCEDURE — 84439 ASSAY OF FREE THYROXINE: CPT

## 2025-03-25 PROCEDURE — 3017F COLORECTAL CA SCREEN DOC REV: CPT | Performed by: STUDENT IN AN ORGANIZED HEALTH CARE EDUCATION/TRAINING PROGRAM

## 2025-03-25 PROCEDURE — 1159F MED LIST DOCD IN RCRD: CPT | Performed by: STUDENT IN AN ORGANIZED HEALTH CARE EDUCATION/TRAINING PROGRAM

## 2025-03-25 PROCEDURE — 82607 VITAMIN B-12: CPT

## 2025-03-25 PROCEDURE — G0296 VISIT TO DETERM LDCT ELIG: HCPCS | Performed by: STUDENT IN AN ORGANIZED HEALTH CARE EDUCATION/TRAINING PROGRAM

## 2025-03-25 PROCEDURE — 3078F DIAST BP <80 MM HG: CPT | Performed by: STUDENT IN AN ORGANIZED HEALTH CARE EDUCATION/TRAINING PROGRAM

## 2025-03-25 PROCEDURE — 36415 COLL VENOUS BLD VENIPUNCTURE: CPT

## 2025-03-25 PROCEDURE — 99213 OFFICE O/P EST LOW 20 MIN: CPT | Performed by: STUDENT IN AN ORGANIZED HEALTH CARE EDUCATION/TRAINING PROGRAM

## 2025-03-25 PROCEDURE — 2022F DILAT RTA XM EVC RTNOPTHY: CPT | Performed by: STUDENT IN AN ORGANIZED HEALTH CARE EDUCATION/TRAINING PROGRAM

## 2025-03-25 PROCEDURE — 1123F ACP DISCUSS/DSCN MKR DOCD: CPT | Performed by: STUDENT IN AN ORGANIZED HEALTH CARE EDUCATION/TRAINING PROGRAM

## 2025-03-25 PROCEDURE — 3074F SYST BP LT 130 MM HG: CPT | Performed by: STUDENT IN AN ORGANIZED HEALTH CARE EDUCATION/TRAINING PROGRAM

## 2025-03-25 PROCEDURE — G8427 DOCREV CUR MEDS BY ELIG CLIN: HCPCS | Performed by: STUDENT IN AN ORGANIZED HEALTH CARE EDUCATION/TRAINING PROGRAM

## 2025-03-25 PROCEDURE — 4004F PT TOBACCO SCREEN RCVD TLK: CPT | Performed by: STUDENT IN AN ORGANIZED HEALTH CARE EDUCATION/TRAINING PROGRAM

## 2025-03-25 PROCEDURE — 1125F AMNT PAIN NOTED PAIN PRSNT: CPT | Performed by: STUDENT IN AN ORGANIZED HEALTH CARE EDUCATION/TRAINING PROGRAM

## 2025-03-25 PROCEDURE — G2211 COMPLEX E/M VISIT ADD ON: HCPCS | Performed by: STUDENT IN AN ORGANIZED HEALTH CARE EDUCATION/TRAINING PROGRAM

## 2025-03-25 PROCEDURE — 3044F HG A1C LEVEL LT 7.0%: CPT | Performed by: STUDENT IN AN ORGANIZED HEALTH CARE EDUCATION/TRAINING PROGRAM

## 2025-03-25 PROCEDURE — 84443 ASSAY THYROID STIM HORMONE: CPT

## 2025-03-25 PROCEDURE — G8417 CALC BMI ABV UP PARAM F/U: HCPCS | Performed by: STUDENT IN AN ORGANIZED HEALTH CARE EDUCATION/TRAINING PROGRAM

## 2025-03-25 RX ORDER — ASPIRIN 81 MG
TABLET,CHEWABLE ORAL
COMMUNITY
Start: 2025-01-08

## 2025-03-25 RX ORDER — TRIAMCINOLONE ACETONIDE 0.25 MG/G
CREAM TOPICAL 2 TIMES DAILY
Qty: 15 G | Refills: 1 | Status: SHIPPED | OUTPATIENT
Start: 2025-03-25

## 2025-03-25 RX ORDER — ATORVASTATIN CALCIUM 40 MG/1
40 TABLET, FILM COATED ORAL DAILY
Qty: 90 TABLET | Refills: 0 | Status: SHIPPED | OUTPATIENT
Start: 2025-03-25

## 2025-03-25 RX ORDER — UREA 40 %
CREAM (GRAM) TOPICAL
Status: CANCELLED | OUTPATIENT
Start: 2025-03-25

## 2025-03-25 RX ORDER — METHOCARBAMOL 500 MG/1
500 TABLET, FILM COATED ORAL 3 TIMES DAILY
Qty: 180 TABLET | Refills: 1 | Status: CANCELLED | OUTPATIENT
Start: 2025-03-25

## 2025-03-25 RX ORDER — UREA 40 %
CREAM (GRAM) TOPICAL
COMMUNITY

## 2025-03-25 RX ORDER — CLOPIDOGREL BISULFATE 75 MG/1
75 TABLET ORAL DAILY
Qty: 30 TABLET | Refills: 0 | Status: SHIPPED | OUTPATIENT
Start: 2025-03-25

## 2025-03-25 RX ORDER — TIZANIDINE 2 MG/1
TABLET ORAL
Qty: 90 TABLET | Refills: 0 | Status: SHIPPED | OUTPATIENT
Start: 2025-03-25

## 2025-03-25 RX ORDER — DIAZEPAM 5 MG/1
5 TABLET ORAL EVERY 6 HOURS PRN
Status: CANCELLED | OUTPATIENT
Start: 2025-03-25

## 2025-03-25 RX ORDER — LISINOPRIL 10 MG/1
10 TABLET ORAL DAILY
Qty: 90 TABLET | Refills: 0 | Status: SHIPPED | OUTPATIENT
Start: 2025-03-25

## 2025-03-25 RX ORDER — AMMONIUM LACTATE 12 G/100G
LOTION TOPICAL
COMMUNITY
Start: 2025-01-08 | End: 2025-03-25 | Stop reason: SDUPTHER

## 2025-03-25 RX ORDER — AMMONIUM LACTATE 12 G/100G
LOTION TOPICAL
Qty: 396 G | Refills: 2 | Status: SHIPPED | OUTPATIENT
Start: 2025-03-25

## 2025-03-25 RX ORDER — LORATADINE 10 MG/1
10 TABLET ORAL DAILY
Qty: 90 TABLET | Refills: 0 | Status: SHIPPED | OUTPATIENT
Start: 2025-03-25

## 2025-03-25 RX ORDER — DIAZEPAM 5 MG/1
5 TABLET ORAL EVERY 6 HOURS PRN
COMMUNITY

## 2025-03-25 RX ORDER — ALBUTEROL SULFATE 90 UG/1
1 INHALANT RESPIRATORY (INHALATION) EVERY 4 HOURS PRN
Qty: 17 EACH | Refills: 2 | Status: SHIPPED | OUTPATIENT
Start: 2025-03-25

## 2025-03-25 ASSESSMENT — PATIENT HEALTH QUESTIONNAIRE - PHQ9
2. FEELING DOWN, DEPRESSED OR HOPELESS: NOT AT ALL
1. LITTLE INTEREST OR PLEASURE IN DOING THINGS: NOT AT ALL
SUM OF ALL RESPONSES TO PHQ QUESTIONS 1-9: 0

## 2025-03-25 ASSESSMENT — ENCOUNTER SYMPTOMS
ABDOMINAL PAIN: 0
SHORTNESS OF BREATH: 0

## 2025-03-25 NOTE — PROGRESS NOTES
\"Have you been to the ER, urgent care clinic since your last visit?  Hospitalized since your last visit?\"    NO    “Have you seen or consulted any other health care providers outside our system since your last visit?”    YES - When: approximately 1  weeks ago.  Where and Why: neurology.      “Have you had a colorectal cancer screening such as a colonoscopy/FIT/Cologuard?    YES - Type: Colonoscopy - Where: [facility]    Date of last Colonoscopy: 6/20/2019  No cologuard on file  No FIT/FOBT on file   No flexible sigmoidoscopy on file     “Have you had a diabetic eye exam?”    NO     No diabetic eye exam on file

## 2025-03-25 NOTE — ASSESSMENT & PLAN NOTE
Chronic, at goal (stable),   Last A1C 5.5. Repeat A1C at next visit  Orders:    Albumin/Creatinine Ratio, Urine; Future

## 2025-03-25 NOTE — ASSESSMENT & PLAN NOTE
Chronic, at goal (stable), continue current treatment plan    Orders:    lisinopril (PRINIVIL;ZESTRIL) 10 MG tablet; Take 1 tablet by mouth daily

## 2025-03-25 NOTE — PROGRESS NOTES
Paula Looney Jr. (:  1955) is a 69 y.o. male,Established patient, here for evaluation of the following chief complaint(s):  Chronic Pain and Medication Refill      Assessment & Plan  Stress and adjustment reaction   Chronic, not at goal (unstable),   Pt denies depression symptoms at this time. He would like to proceed with referral to psych and therapy for further evaluation  Orders:    External Referral To Psychology    Depression, unspecified depression type   Chronic, not at goal (unstable),     Orders:    External Referral To Psychiatry    Vitamin B12; Future    TSH + Free T4 Panel; Future    External Referral To Psychology    Essential (primary) hypertension   Chronic, at goal (stable), continue current treatment plan    Orders:    lisinopril (PRINIVIL;ZESTRIL) 10 MG tablet; Take 1 tablet by mouth daily    Type 2 diabetes mellitus with other diabetic neurological complication   Chronic, at goal (stable),   Last A1C 5.5. Repeat A1C at next visit  Orders:    Albumin/Creatinine Ratio, Urine; Future    Mixed hyperlipidemia   Chronic, at goal (stable),   Last LDL 26      Orders:    atorvastatin (LIPITOR) 40 MG tablet; Take 1 tablet by mouth daily    Concern about memory   Monitored by specialist- no acute findings meriting change in the plan  Reviewed recent notes, labs and imaging from neurology provider    Orders:    Vitamin B12; Future    TSH + Free T4 Panel; Future    Dyspnea, unspecified   Chronic, at goal (stable), continue current treatment plan    Orders:    albuterol sulfate HFA (PROVENTIL;VENTOLIN;PROAIR) 108 (90 Base) MCG/ACT inhaler; Inhale 1 puff into the lungs every 4 hours as needed for Wheezing    Other chronic pain   Monitored by specialist- no acute findings meriting change in the plan  -Reviewed recent notes, labs and imaging from ortho provider       PAD (peripheral artery disease)   Monitored by specialist- no acute findings meriting change in the plan  -Reviewed recent notes, labs

## 2025-03-27 ENCOUNTER — RESULTS FOLLOW-UP (OUTPATIENT)
Facility: CLINIC | Age: 70
End: 2025-03-27

## 2025-03-27 DIAGNOSIS — E07.9 DISORDER OF THYROID, UNSPECIFIED: ICD-10-CM

## 2025-03-27 DIAGNOSIS — R79.89 ABNORMAL TSH: Primary | ICD-10-CM

## 2025-03-27 NOTE — RESULT ENCOUNTER NOTE
Your Thyroid level is mildly elevated, the best thing to do is repeat this as sometimes this could be due to lab error. I will place the order and you can come get done at your convenience.  If still elevated we will need to discuss to treat thyroid dysfunction.

## 2025-06-16 DIAGNOSIS — I70.0 ATHEROSCLEROSIS OF AORTA: ICD-10-CM

## 2025-06-16 DIAGNOSIS — R06.00 DYSPNEA, UNSPECIFIED TYPE: ICD-10-CM

## 2025-06-16 DIAGNOSIS — I73.9 PAD (PERIPHERAL ARTERY DISEASE): ICD-10-CM

## 2025-06-16 RX ORDER — CLOPIDOGREL BISULFATE 75 MG/1
75 TABLET ORAL DAILY
Qty: 90 TABLET | Refills: 1 | Status: SHIPPED | OUTPATIENT
Start: 2025-06-16

## 2025-06-16 RX ORDER — LORATADINE 10 MG/1
10 TABLET ORAL DAILY
Qty: 90 TABLET | Refills: 1 | Status: SHIPPED | OUTPATIENT
Start: 2025-06-16

## 2025-06-16 RX ORDER — ALBUTEROL SULFATE 90 UG/1
INHALANT RESPIRATORY (INHALATION)
Qty: 18 G | Refills: 2 | Status: SHIPPED | OUTPATIENT
Start: 2025-06-16

## 2025-06-16 NOTE — TELEPHONE ENCOUNTER
Mr. Looney is requesting refills of:    Requested Prescriptions     Pending Prescriptions Disp Refills    loratadine (CLARITIN) 10 MG tablet [Pharmacy Med Name: Loratadine 10 MG Oral Tablet] 90 tablet 0     Sig: Take 1 tablet by mouth once daily    albuterol sulfate HFA (PROVENTIL;VENTOLIN;PROAIR) 108 (90 Base) MCG/ACT inhaler [Pharmacy Med Name: Albuterol Sulfate  (90 Base) MCG/ACT Inhalation Aerosol Solution] 18 g 0     Sig: INHALE 1 PUFF BY MOUTH EVERY 4 HOURS AS NEEDED FOR WHEEZING    clopidogrel (PLAVIX) 75 MG tablet [Pharmacy Med Name: Clopidogrel Bisulfate 75 MG Oral Tablet] 30 tablet 0     Sig: Take 1 tablet by mouth once daily         to be sent to   [address] [phone] -  [phone]  [facility] [address]  Phone: [phone] Fax: [phone]  .     LAST OFFICE VISIT:  3/25/2025     UPCOMING APPOINTMENT(S):  Future Appointments   Date Time Provider Department Center   7/15/2025  8:30 AM Eloisa Cheek DO [facility]   8/15/2025  8:30 AM Ac Santos MD CAP [facility]         Provided notified

## (undated) DEVICE — SYRINGE MED 25GA 3ML L5/8IN SUBQ PLAS W/ DETACH NDL SFTY

## (undated) DEVICE — SYRINGE MED 50ML LUERSLIP TIP

## (undated) DEVICE — GAUZE,SPONGE,4"X4",16PLY,STRL,LF,10/TRAY: Brand: MEDLINE

## (undated) DEVICE — STERILE POLYISOPRENE POWDER-FREE SURGICAL GLOVES: Brand: PROTEXIS

## (undated) DEVICE — BITE BLOCK ENDOSCP UNIV AD 6 TO 9.4 MM

## (undated) DEVICE — ENDOSCOPY PUMP TUBING/ CAP SET: Brand: ERBE

## (undated) DEVICE — FORCEPS BX L240CM JAW DIA2.4MM ORNG L CAP W/ NDL DISP RAD

## (undated) DEVICE — YANKAUER,SMOOTH HANDLE,HIGH CAPACITY: Brand: MEDLINE INDUSTRIES, INC.

## (undated) DEVICE — CATHETER SUCT TR FL TIP 14FR W/ O CTRL

## (undated) DEVICE — BASIN EMSIS 16OZ GRAPHITE PLAS KID SHP MOLD GRAD FOR ORAL

## (undated) DEVICE — MEDI-VAC NON-CONDUCTIVE SUCTION TUBING: Brand: CARDINAL HEALTH

## (undated) DEVICE — SOLUTION IRRIG 1000ML H2O STRL BLT

## (undated) DEVICE — LINER SUCT CANSTR 3000CC PLAS SFT PRE ASSEMB W/OUT TBNG W/

## (undated) DEVICE — UNDERPAD INCONT W23XL36IN STD BLU POLYPR BK FLUF SFT

## (undated) DEVICE — CANNULA NSL AD TBNG L14FT STD PVC O2 CRV CONN NONFLARED NSL